# Patient Record
Sex: MALE | Race: WHITE | NOT HISPANIC OR LATINO | Employment: FULL TIME | URBAN - METROPOLITAN AREA
[De-identification: names, ages, dates, MRNs, and addresses within clinical notes are randomized per-mention and may not be internally consistent; named-entity substitution may affect disease eponyms.]

---

## 2017-01-17 ENCOUNTER — TELEPHONE (OUTPATIENT)
Dept: RADIOLOGY | Facility: HOSPITAL | Age: 62
End: 2017-01-17

## 2017-01-25 ENCOUNTER — TELEPHONE (OUTPATIENT)
Dept: TRANSPLANT | Facility: CLINIC | Age: 62
End: 2017-01-25

## 2017-01-25 NOTE — LETTER
January 25, 2017    Chapo Jacobo  [address] [facility] [address]          Dear Chapo Jacobo:  MRN: [id number]    Your lab work was due to be drawn on 1/23/17.  It is very important to get your labs drawn as scheduled.  We cannot monitor you for rejection, infections, or drug toxicity side effects without lab results. We also cannot refill medications without current lab results. Please call us at [phone] as soon as possible to let us know when you plan to have labs drawn.    You are also overdue for both your clinic visit with Dr. Reyes and repeat liver transplant ultrasound.  Please call us as soon as possible to reschedule.       Sincerely,    Jennifer Patrick, RN, BSN  Liver Transplant Coordinator  [facility]  [address] [place] [address]  [phone]

## 2017-02-06 ENCOUNTER — TELEPHONE (OUTPATIENT)
Dept: GASTROENTEROLOGY | Facility: CLINIC | Age: 62
End: 2017-02-06

## 2017-02-06 DIAGNOSIS — K74.69 COMPENSATED HCV CIRRHOSIS: Primary | ICD-10-CM

## 2017-02-06 DIAGNOSIS — B19.20 COMPENSATED HCV CIRRHOSIS: Primary | ICD-10-CM

## 2017-02-06 NOTE — TELEPHONE ENCOUNTER
----- Message from Consuelo Ch sent at 2/6/2017 12:52 PM CST -----  Patient would like to reschedule his appointment and ultra sound that he cancelled on January 18.   Call him at [phone].                                            celis

## 2017-02-10 ENCOUNTER — LAB VISIT (OUTPATIENT)
Dept: LAB | Facility: HOSPITAL | Age: 62
End: 2017-02-10
Attending: INTERNAL MEDICINE
Payer: MEDICARE

## 2017-02-10 ENCOUNTER — TELEPHONE (OUTPATIENT)
Dept: TRANSPLANT | Facility: CLINIC | Age: 62
End: 2017-02-10

## 2017-02-10 DIAGNOSIS — Z94.4 LIVER TRANSPLANTED: ICD-10-CM

## 2017-02-10 LAB
ALBUMIN SERPL BCP-MCNC: 4.2 G/DL
ALP SERPL-CCNC: 88 U/L
ALT SERPL W/O P-5'-P-CCNC: 20 U/L
ANION GAP SERPL CALC-SCNC: 8 MMOL/L
AST SERPL-CCNC: 28 U/L
BASOPHILS # BLD AUTO: 0.07 K/UL
BASOPHILS NFR BLD: 0.8 %
BILIRUB SERPL-MCNC: 0.6 MG/DL
BUN SERPL-MCNC: 13 MG/DL
CALCIUM SERPL-MCNC: 9.6 MG/DL
CHLORIDE SERPL-SCNC: 98 MMOL/L
CO2 SERPL-SCNC: 27 MMOL/L
CREAT SERPL-MCNC: 1.2 MG/DL
DIFFERENTIAL METHOD: ABNORMAL
EOSINOPHIL # BLD AUTO: 0.2 K/UL
EOSINOPHIL NFR BLD: 1.9 %
ERYTHROCYTE [DISTWIDTH] IN BLOOD BY AUTOMATED COUNT: 13.7 %
EST. GFR  (AFRICAN AMERICAN): >60 ML/MIN/1.73 M^2
EST. GFR  (NON AFRICAN AMERICAN): >60 ML/MIN/1.73 M^2
GLUCOSE SERPL-MCNC: 144 MG/DL
HCT VFR BLD AUTO: 46.5 %
HGB BLD-MCNC: 16.4 G/DL
INR PPP: 1.1
LYMPHOCYTES # BLD AUTO: 1.6 K/UL
LYMPHOCYTES NFR BLD: 19.5 %
MCH RBC QN AUTO: 32.7 PG
MCHC RBC AUTO-ENTMCNC: 35.3 %
MCV RBC AUTO: 93 FL
MONOCYTES # BLD AUTO: 0.5 K/UL
MONOCYTES NFR BLD: 5.9 %
NEUTROPHILS # BLD AUTO: 6 K/UL
NEUTROPHILS NFR BLD: 71.5 %
PLATELET # BLD AUTO: 210 K/UL
PMV BLD AUTO: 12.3 FL
POTASSIUM SERPL-SCNC: 4.7 MMOL/L
PROT SERPL-MCNC: 8.3 G/DL
PROTHROMBIN TIME: 11.7 SEC
RBC # BLD AUTO: 5.02 M/UL
SODIUM SERPL-SCNC: 133 MMOL/L
WBC # BLD AUTO: 8.43 K/UL

## 2017-02-10 PROCEDURE — 85610 PROTHROMBIN TIME: CPT

## 2017-02-10 PROCEDURE — 80053 COMPREHEN METABOLIC PANEL: CPT

## 2017-02-10 PROCEDURE — 36415 COLL VENOUS BLD VENIPUNCTURE: CPT | Mod: PO

## 2017-02-10 PROCEDURE — 85025 COMPLETE CBC W/AUTO DIFF WBC: CPT

## 2017-02-10 PROCEDURE — 80197 ASSAY OF TACROLIMUS: CPT

## 2017-02-10 NOTE — TELEPHONE ENCOUNTER
"Pt called, states he forgot about labs but did them today.  Pt also requests that we reschedule his clinic and u/s apt in [place].  Pt also states "somone called him and left him a message" about "a medicine." Pt unsure of who called him or what medication was called in.  Advised pt to re listen to the VM to see if the provider and medication is stated. Pt states he "is too tired" to do that.  Advised pt that I do not see any missed calls from any dept at [facility] regarding medications. Pt verbalized understanding.   "

## 2017-02-11 LAB — TACROLIMUS BLD-MCNC: 2.5 NG/ML

## 2017-02-14 ENCOUNTER — TELEPHONE (OUTPATIENT)
Dept: TRANSPLANT | Facility: CLINIC | Age: 62
End: 2017-02-14

## 2017-02-14 NOTE — LETTER
February 14, 2017    Chapo Odalis  [address] [facility] [address]          Dear Chapo Jacobo:  MRN: [id number]    Your lab results were stable.  There are no medicine changes.  Please have your labs drawn again on 5/15/17.      If you cannot have your labs drawn on the scheduled date, it is your responsibility to call the transplant department to reschedule.  To reschedule or make an appointment, please as to speak to or leave a message for my assistant, Yuko Weston, at [phone].  When leaving a message for Trista Huntley, or myself, we ask that you leave a brief message regarding your request.    Sincerely,        Your Liver Transplant Coordinator    [facility]  [address] [place] [address] [phone]

## 2017-02-15 ENCOUNTER — TELEPHONE (OUTPATIENT)
Dept: RADIOLOGY | Facility: HOSPITAL | Age: 62
End: 2017-02-15

## 2017-02-16 ENCOUNTER — TELEPHONE (OUTPATIENT)
Dept: GASTROENTEROLOGY | Facility: CLINIC | Age: 62
End: 2017-02-16

## 2017-02-16 NOTE — TELEPHONE ENCOUNTER
Left message on answering machine to return a call to Dr. Reyes's office at [facility].  Confirming appointment.

## 2017-02-17 ENCOUNTER — TELEPHONE (OUTPATIENT)
Dept: GASTROENTEROLOGY | Facility: CLINIC | Age: 62
End: 2017-02-17

## 2017-02-17 NOTE — TELEPHONE ENCOUNTER
----- Message from Karen Vinson sent at 2/17/2017 10:08 AM CST -----  Contact: pt  Pt asked to speak with Chloe concerning missed appt, pt can be reached at [phone]///thxMW

## 2017-03-08 RX ORDER — TACROLIMUS 1 MG/1
CAPSULE ORAL
Qty: 120 CAPSULE | Refills: 5 | Status: SHIPPED | OUTPATIENT
Start: 2017-03-08 | End: 2018-07-24 | Stop reason: SDUPTHER

## 2017-04-10 ENCOUNTER — TELEPHONE (OUTPATIENT)
Dept: GASTROENTEROLOGY | Facility: CLINIC | Age: 62
End: 2017-04-10

## 2017-04-10 NOTE — TELEPHONE ENCOUNTER
Left message on answering machine to return a call to Dr. Reyes's office at [facility].  Schedule appointment.

## 2017-04-18 ENCOUNTER — TELEPHONE (OUTPATIENT)
Dept: GASTROENTEROLOGY | Facility: CLINIC | Age: 62
End: 2017-04-18

## 2017-04-18 NOTE — TELEPHONE ENCOUNTER
Spoke with patient. Informed him of appointments for Dr. Reyes, he verbalized understanding. SHANTEL

## 2017-05-12 ENCOUNTER — TELEPHONE (OUTPATIENT)
Dept: RADIOLOGY | Facility: HOSPITAL | Age: 62
End: 2017-05-12

## 2017-05-15 ENCOUNTER — HOSPITAL ENCOUNTER (OUTPATIENT)
Dept: RADIOLOGY | Facility: HOSPITAL | Age: 62
Discharge: HOME | End: 2017-05-15
Attending: INTERNAL MEDICINE
Payer: MEDICARE

## 2017-05-15 DIAGNOSIS — B19.20 COMPENSATED HCV CIRRHOSIS: ICD-10-CM

## 2017-05-15 DIAGNOSIS — K74.69 COMPENSATED HCV CIRRHOSIS: ICD-10-CM

## 2017-05-15 PROCEDURE — 76705 ECHO EXAM OF ABDOMEN: CPT | Mod: TC

## 2017-05-15 PROCEDURE — 76705 ECHO EXAM OF ABDOMEN: CPT | Mod: 26,,, | Performed by: RADIOLOGY

## 2017-05-17 ENCOUNTER — OFFICE VISIT (OUTPATIENT)
Dept: GASTROENTEROLOGY | Facility: CLINIC | Age: 62
End: 2017-05-17
Payer: MEDICARE

## 2017-05-17 ENCOUNTER — TELEPHONE (OUTPATIENT)
Dept: TRANSPLANT | Facility: CLINIC | Age: 62
End: 2017-05-17

## 2017-05-17 VITALS
SYSTOLIC BLOOD PRESSURE: 138 MMHG | HEIGHT: 70 IN | DIASTOLIC BLOOD PRESSURE: 66 MMHG | HEART RATE: 88 BPM | WEIGHT: 229.25 LBS | BODY MASS INDEX: 32.82 KG/M2

## 2017-05-17 DIAGNOSIS — K74.69 COMPENSATED HCV CIRRHOSIS: Primary | ICD-10-CM

## 2017-05-17 DIAGNOSIS — Z94.4 TRANSPLANTED LIVER: ICD-10-CM

## 2017-05-17 DIAGNOSIS — Z94.4 LIVER TRANSPLANTED: Primary | ICD-10-CM

## 2017-05-17 DIAGNOSIS — D84.9 IMMUNOSUPPRESSION: ICD-10-CM

## 2017-05-17 DIAGNOSIS — B19.20 COMPENSATED HCV CIRRHOSIS: Primary | ICD-10-CM

## 2017-05-17 DIAGNOSIS — N52.1 ERECTILE DYSFUNCTION DUE TO DISEASES CLASSIFIED ELSEWHERE: ICD-10-CM

## 2017-05-17 DIAGNOSIS — Z12.11 COLON CANCER SCREENING: ICD-10-CM

## 2017-05-17 PROCEDURE — 99999 PR PBB SHADOW E&M-EST. PATIENT-LVL III: CPT | Mod: PBBFAC,,, | Performed by: INTERNAL MEDICINE

## 2017-05-17 PROCEDURE — 99213 OFFICE O/P EST LOW 20 MIN: CPT | Mod: S$GLB,,, | Performed by: INTERNAL MEDICINE

## 2017-05-17 RX ORDER — ALPRAZOLAM 1 MG/1
1 TABLET ORAL 2 TIMES DAILY
Refills: 1 | COMMUNITY
Start: 2017-05-05

## 2017-05-17 RX ORDER — POLYETHYLENE GLYCOL 3350, SODIUM SULFATE ANHYDROUS, SODIUM BICARBONATE, SODIUM CHLORIDE, POTASSIUM CHLORIDE 236; 22.74; 6.74; 5.86; 2.97 G/4L; G/4L; G/4L; G/4L; G/4L
4 POWDER, FOR SOLUTION ORAL ONCE
Qty: 4000 ML | Refills: 0 | Status: SHIPPED | OUTPATIENT
Start: 2017-05-17 | End: 2017-05-17

## 2017-05-17 NOTE — TELEPHONE ENCOUNTER
----- Message from Viviana Reyes MD sent at 5/17/2017 12:51 PM CDT -----  Repeat labs per routine    MELD-Na score: 6 at 5/15/2017  9:18 AM  MELD score: 6 at 5/15/2017  9:18 AM  Calculated from:  Serum Creatinine: 0.9 mg/dL (Rounded to 1) at 5/15/2017  9:18 AM  Serum Sodium: 138 mmol/L (Rounded to 137) at 5/15/2017  9:18 AM  Total Bilirubin: 0.6 mg/dL (Rounded to 1) at 5/15/2017  9:18 AM  INR(ratio): 1.0 at 5/15/2017  9:18 AM  Age: 61 years

## 2017-05-17 NOTE — MR AVS SNAPSHOT
[facility]  Gastroenterology  [address]  [facility] [address]  Phone: [phone]  Fax: [phone]                  Chapo Jacobo   2017 10:30 AM   Office Visit    Description:  Male : 1955   Provider:  Viviana Reyes MD   Department:  [facility] - Gastroenterology           Reason for Visit     Cirrhosis           Diagnoses this Visit        Comments    Compensated HCV cirrhosis    -  Primary     Transplanted liver         Immunosuppression         Colon cancer screening         Erectile dysfunction due to diseases classified elsewhere                To Do List           Future Appointments        Provider Department Dept Phone    7/3/2017 8:40 AM MD [facility] - Urology [phone]    2017 8:00 AM ONLH US1 [facility] [phone]    2017 8:50 AM LABORATORY, [address] [facility] [phone]    2017 11:00 AM Viviana Reyes MD [facility] Gastroenterology [phone]      Your Future Surgeries/Procedures     Jul 10, 2017   Surgery with Viviana Reyes MD   [facility])    [address]  [facility] [address] [phone]              Goals (5 Years of Data)     None       These Medications        Disp Refills Start End    polyethylene glycol (GOLYTELY,NULYTELY) 236-22.74-6.74 -5.86 gram suspension 4000 mL 0 2017    Take 4,000 mLs (4 L total) by mouth once. - Oral    Pharmacy: [facility] [address] [address] Ph #: [phone]         [facility]     [facility] Care Line -  Assistance  Unless otherwise directed by your provider, please contact [facility], our nurse care line that is available for  assistance.     Registered nurses in the [facility] Center provide: appointment scheduling, clinical advisement, health education, and other advisory services.  Call:  [phone] (toll free)               Medications           Message regarding Medications     Verify the changes and/or additions to your medication regime listed below are the same as discussed with your clinician today.  If any of these changes or additions are incorrect, please notify your healthcare provider.        START taking these NEW medications        Refills    polyethylene glycol (GOLYTELY,NULYTELY) 236-22.74-6.74 -5.86 gram suspension 0    Sig: Take 4,000 mLs (4 L total) by mouth once.    Class: Normal    Route: Oral      STOP taking these medications     testosterone cypionate (DEPOTESTOTERONE CYPIONATE) 200 mg/mL injection Inject 200 mg into the muscle every 14 (fourteen) days.           Verify that the below list of medications is an accurate representation of the medications you are currently taking.  If none reported, the list may be blank. If incorrect, please contact your healthcare provider. Carry this list with you in case of emergency.           Current Medications     alprazolam (XANAX) 1 MG tablet Take 1 mg by mouth 2 (two) times daily.    amphetamine-dextroamphetamine (ADDERALL) 30 mg Tab Take 1 tablet by mouth Daily.    dutasteride-tamsulosin 0.5-0.4 mg CM24 Take by mouth.    fluoxetine (PROZAC) 20 MG capsule Take 4 capsules by mouth Daily.     glimepiride (AMARYL) 4 MG tablet Take 1 tablet by mouth as needed.     hydrocortisone 2.5 % cream     metformin (GLUCOPHAGE) 1000 MG tablet Take 1 tablet by mouth Daily.    pioglitazone (ACTOS) 30 MG tablet Take 1 tablet by mouth Daily.    valacyclovir (VALTREX) 500 MG tablet Take 500 mg by mouth once daily.     zolpidem (AMBIEN) 10 mg Tab Take 5 mg by mouth nightly as needed.    loratadine (CLARITIN) 10 mg tablet Take 1 tablet (10 mg total) by mouth once daily.    polyethylene glycol (GOLYTELY,NULYTELY) 236-22.74-6.74 -5.86 gram suspension Take 4,000 mLs (4 L total) by mouth once.    tacrolimus (PROGRAF) 1 MG Cap TAKE 2 CAPSULES BY MOUTH TWICE  "DAILY           Clinical Reference Information           Your Vitals Were     BP Pulse Height Weight BMI    138/66 88 5' 10" (1.778 m) 104 kg (229 lb 4.5 oz) 32.9 kg/m2      Blood Pressure          Most Recent Value    BP  138/66      Allergies as of 5/17/2017     Codeine    Darvocet A500 [Propoxyphene N-acetaminophen]    Paroxetine Hcl    Penicillins      Immunizations Administered on Date of Encounter - 5/17/2017     None      Orders Placed During Today's Visit      Normal Orders This Visit    Ambulatory Referral to Urology     Case request GI: ESOPHAGOGASTRODUODENOSCOPY (EGD), COLONOSCOPY     Recurring Lab Work Interval Expires    AFP tumor marker   5/17/2018    CBC auto differential   5/17/2018    Comprehensive metabolic panel   5/17/2018    Protime-INR   5/17/2018    US Abdomen Limited   5/17/2018      Maintenance Dialysis History     Patient has no recorded history of maintenance dialysis.      Transplant Information        Txp Date Organ Coordinator Care Team    12/29/2000 Liver Jennifer Patrick RN Surgeon:  Maurisio Martins MD   Referring Physician:  DEBORAH Nash MD   Assisting Surgeon:  Moreno Soriano Jr., MD   Current Hepatologist:  Moreno Aldana MD   Corresponding Physician:  Ismael Jackson MD   Current PCP:  Ismael Jackson MD         [facility] Sign-Up     Activating your [facility] account is as easy as 1-2-3!     1) Visit [email or web address], select Sign Up Now, enter this activation code and your date of birth, then select Next.  [email or web address]  Expires: 7/1/2017 11:17 AM      2) Create a username and password to use when you visit [facility] in the future and select a security question in case you lose your password and select Next.    3) Enter your e-mail address and click Sign Up!    Additional Information  If you have questions, please e-mail [email or web address] or call [phone] to talk to our [facility] staff. Remember, [facility] is NOT to be used for urgent needs. For medical emergencies, dial " 341.         Smoking Cessation     If you would like to quit smoking:   You may be eligible for free services if you are a [place] resident and started smoking cigarettes before September 1, 1988.  Call the Smoking Cessation Trust ([facility]) toll free at (577) 172-8263 or [phone]) 395-2243.   Call 1-800-QUIT-NOW if you do not meet the above criteria.   Contact us via email: [email or web address]   View our website for more information: [email or web address]        Language Assistance Services     ATTENTION: Language assistance services are available, free of charge. Please call [phone].      ATENCIÓN: Si habla español, tiene a eason disposición servicios gratuitos de asistencia lingüística. Llame al [phone].     CHÚ Ý: N?u b?n nói Ti?ng Vi?t, có các d?ch v? h? tr? ngôn ng? mi?n phí dành cho b?n. G?i s? [phone].         [facility] complies with applicable Federal civil rights laws and does not discriminate on the basis of race, color, national origin, age, disability, or sex.

## 2017-05-17 NOTE — PROGRESS NOTES
Transplant Hepatology  Liver Transplant Recipient Follow-up    Transplant Date: 12/29/2000      Chapo is here for follow up of his liver transplant.      Subjective:     Interval History:    Mr. Jacobo is s/p liver transplant for HCV cirrhosis 2000, had recurrent HCV cirrhosis on 2007 biopsy. He was treated again with interferon and achieved SVR.     He continues with fatigue. Main concern is erectile dysfunction.    No evidence of liver decompensation: no ascites, confusion or GI bleeding.        Review of Systems    Objective:     Physical Exam   Constitutional: He is oriented to person, place, and time. He appears well-developed and well-nourished. No distress.   HENT:   Head: Normocephalic and atraumatic.   Mouth/Throat: Oropharynx is clear and moist. No oropharyngeal exudate.   Eyes: Conjunctivae are normal. Pupils are equal, round, and reactive to light. Right eye exhibits no discharge. Left eye exhibits no discharge. No scleral icterus.   Pulmonary/Chest: Effort normal and breath sounds normal. No respiratory distress. He has no wheezes.   Abdominal: Soft. He exhibits no distension. There is no tenderness.   Musculoskeletal: He exhibits no edema.   Neurological: He is alert and oriented to person, place, and time.   Psychiatric: He has a normal mood and affect. His behavior is normal.   Vitals reviewed.      WBC   Date Value Ref Range Status   05/15/2017 6.91 3.90 - 12.70 K/uL Final     Hemoglobin   Date Value Ref Range Status   05/15/2017 16.7 14.0 - 18.0 g/dL Final     Hematocrit   Date Value Ref Range Status   05/15/2017 47.5 40.0 - 54.0 % Final     Platelets   Date Value Ref Range Status   05/15/2017 200 150 - 350 K/uL Final     BUN, Bld   Date Value Ref Range Status   05/15/2017 10 8 - 23 mg/dL Final     Creatinine   Date Value Ref Range Status   05/15/2017 0.9 0.5 - 1.4 mg/dL Final     Glucose   Date Value Ref Range Status   05/15/2017 122 (H) 70 - 110 mg/dL Final     Calcium   Date Value Ref Range  Status   05/15/2017 9.0 8.7 - 10.5 mg/dL Final     Sodium   Date Value Ref Range Status   05/15/2017 138 136 - 145 mmol/L Final     Potassium   Date Value Ref Range Status   05/15/2017 3.9 3.5 - 5.1 mmol/L Final     Chloride   Date Value Ref Range Status   05/15/2017 104 95 - 110 mmol/L Final     AST   Date Value Ref Range Status   05/15/2017 22 10 - 40 U/L Final     ALT   Date Value Ref Range Status   05/15/2017 19 10 - 44 U/L Final     Alkaline Phosphatase   Date Value Ref Range Status   05/15/2017 79 55 - 135 U/L Final     Total Bilirubin   Date Value Ref Range Status   05/15/2017 0.6 0.1 - 1.0 mg/dL Final     Comment:     For infants and newborns, interpretation of results should be based  on gestational age, weight and in agreement with clinical  observations.  Premature Infant recommended reference ranges:  Up to 24 hours.............<8.0 mg/dL  Up to 48 hours............<12.0 mg/dL  3-5 days..................<15.0 mg/dL  6-29 days.................<15.0 mg/dL       Albumin   Date Value Ref Range Status   05/15/2017 3.6 3.5 - 5.2 g/dL Final     INR   Date Value Ref Range Status   05/15/2017 1.0 0.8 - 1.2 Final     Comment:     Coumadin Therapy:  2.0 - 3.0 for INR for all indicators except mechanical heart valves  and antiphospholipid syndromes which should use 2.5 - 3.5.       Lab Results   Component Value Date    TACROLIMUS 6.0 05/15/2017       MELD-Na score: 6 at 5/15/2017  9:18 AM  MELD score: 6 at 5/15/2017  9:18 AM  Calculated from:  Serum Creatinine: 0.9 mg/dL (Rounded to 1) at 5/15/2017  9:18 AM  Serum Sodium: 138 mmol/L (Rounded to 137) at 5/15/2017  9:18 AM  Total Bilirubin: 0.6 mg/dL (Rounded to 1) at 5/15/2017  9:18 AM  INR(ratio): 1.0 at 5/15/2017  9:18 AM  Age: 61 years      Assessment/Plan:     1. Compensated HCV cirrhosis    2. Transplanted liver    3. Immunosuppression    4. Colon cancer screening    5. Erectile dysfunction due to diseases classified elsewhere      Compensated HCV cirrhosis-low  meld, well compensated  -Repeat meld labs every 6 months  -HCC surveillance every 6 months  -Due for EGD and colonoscopy now    Immunosuppression  -continue current immunosuppression    Transplanted liver-recurrent cirrhosis although with good allograft function  -Labs per routine    History of colon polyps  -Repeat colonoscopy due now    Erectile dysfunction-likely multifactorial including presence of cirrhosis  -Will refer to urology    Return to clinic in 6 months with pre-clinic labs and ultrasound    Patient was seen in the liver transplant department at [facility].    Viviana Reyes MD           [facility] Patient Status  Functional Status: 80% - Normal activity with effort: some symptoms of disease  Physical Capacity: No Limitations

## 2017-05-17 NOTE — LETTER
May 17, 2017    Chapo Odalis  [address] [facility] [address]          Dear Chapo Jacobo:  MRN: [id number]    Your lab results and your ultrasound results were stable.  There are no medicine changes.  Please have your labs drawn again on 8/7/17.      If you cannot have your labs drawn on the scheduled date, it is your responsibility to call the transplant department to reschedule.  To reschedule or make an appointment, please as to speak to or leave a message for my assistant, Yuko Weston, at [phone].  When leaving a message for Trista Huntley, or myself, we ask that you leave a brief message regarding your request.    Sincerely,    Jennifer Patrick, RN, BSN  Liver Transplant Coordinator  [facility]  [address] [place] [address] [phone]

## 2017-05-18 ENCOUNTER — TELEPHONE (OUTPATIENT)
Dept: TRANSPLANT | Facility: CLINIC | Age: 62
End: 2017-05-18

## 2017-07-10 ENCOUNTER — HOSPITAL ENCOUNTER (OUTPATIENT)
Facility: HOSPITAL | Age: 62
Discharge: HOME | End: 2017-07-10
Attending: INTERNAL MEDICINE | Admitting: INTERNAL MEDICINE
Payer: MEDICARE

## 2017-07-10 ENCOUNTER — ANESTHESIA EVENT (OUTPATIENT)
Dept: ENDOSCOPY | Facility: HOSPITAL | Age: 62
End: 2017-07-10
Payer: MEDICARE

## 2017-07-10 ENCOUNTER — ANESTHESIA (OUTPATIENT)
Dept: ENDOSCOPY | Facility: HOSPITAL | Age: 62
End: 2017-07-10
Payer: MEDICARE

## 2017-07-10 VITALS
SYSTOLIC BLOOD PRESSURE: 125 MMHG | TEMPERATURE: 98 F | DIASTOLIC BLOOD PRESSURE: 87 MMHG | OXYGEN SATURATION: 98 % | BODY MASS INDEX: 31.64 KG/M2 | WEIGHT: 221 LBS | HEART RATE: 77 BPM | RESPIRATION RATE: 18 BRPM | RESPIRATION RATE: 21 BRPM | HEIGHT: 70 IN

## 2017-07-10 DIAGNOSIS — K29.30 SUPERFICIAL GASTRITIS WITHOUT HEMORRHAGE, UNSPECIFIED CHRONICITY: ICD-10-CM

## 2017-07-10 DIAGNOSIS — Z12.11 COLON CANCER SCREENING: Primary | ICD-10-CM

## 2017-07-10 LAB — POCT GLUCOSE: 104 MG/DL (ref 70–110)

## 2017-07-10 PROCEDURE — 43239 EGD BIOPSY SINGLE/MULTIPLE: CPT | Performed by: INTERNAL MEDICINE

## 2017-07-10 PROCEDURE — 25000003 PHARM REV CODE 250: Performed by: NURSE ANESTHETIST, CERTIFIED REGISTERED

## 2017-07-10 PROCEDURE — 37000008 HC ANESTHESIA 1ST 15 MINUTES: Performed by: INTERNAL MEDICINE

## 2017-07-10 PROCEDURE — 37000009 HC ANESTHESIA EA ADD 15 MINS: Performed by: INTERNAL MEDICINE

## 2017-07-10 PROCEDURE — 88305 TISSUE EXAM BY PATHOLOGIST: CPT | Mod: 26,,, | Performed by: PATHOLOGY

## 2017-07-10 PROCEDURE — 82962 GLUCOSE BLOOD TEST: CPT | Performed by: INTERNAL MEDICINE

## 2017-07-10 PROCEDURE — 88305 TISSUE EXAM BY PATHOLOGIST: CPT | Performed by: PATHOLOGY

## 2017-07-10 PROCEDURE — 27201012 HC FORCEPS, HOT/COLD, DISP: Performed by: INTERNAL MEDICINE

## 2017-07-10 PROCEDURE — 45380 COLONOSCOPY AND BIOPSY: CPT | Performed by: INTERNAL MEDICINE

## 2017-07-10 PROCEDURE — 25000003 PHARM REV CODE 250: Performed by: INTERNAL MEDICINE

## 2017-07-10 PROCEDURE — 88342 IMHCHEM/IMCYTCHM 1ST ANTB: CPT | Mod: 26,,, | Performed by: PATHOLOGY

## 2017-07-10 PROCEDURE — 63600175 PHARM REV CODE 636 W HCPCS: Performed by: NURSE ANESTHETIST, CERTIFIED REGISTERED

## 2017-07-10 PROCEDURE — 45380 COLONOSCOPY AND BIOPSY: CPT | Mod: ,,, | Performed by: INTERNAL MEDICINE

## 2017-07-10 PROCEDURE — 43239 EGD BIOPSY SINGLE/MULTIPLE: CPT | Mod: 51,,, | Performed by: INTERNAL MEDICINE

## 2017-07-10 RX ORDER — LIDOCAINE HCL/PF 100 MG/5ML
SYRINGE (ML) INTRAVENOUS
Status: DISCONTINUED | OUTPATIENT
Start: 2017-07-10 | End: 2017-07-10

## 2017-07-10 RX ORDER — SODIUM CHLORIDE, SODIUM LACTATE, POTASSIUM CHLORIDE, CALCIUM CHLORIDE 600; 310; 30; 20 MG/100ML; MG/100ML; MG/100ML; MG/100ML
INJECTION, SOLUTION INTRAVENOUS CONTINUOUS
Status: DISCONTINUED | OUTPATIENT
Start: 2017-07-10 | End: 2017-07-10 | Stop reason: HOSPADM

## 2017-07-10 RX ORDER — PANTOPRAZOLE SODIUM 40 MG/1
40 TABLET, DELAYED RELEASE ORAL 2 TIMES DAILY
Qty: 60 TABLET | Refills: 0 | Status: SHIPPED | OUTPATIENT
Start: 2017-07-10 | End: 2019-07-02 | Stop reason: ALTCHOICE

## 2017-07-10 RX ORDER — SODIUM CHLORIDE, SODIUM LACTATE, POTASSIUM CHLORIDE, CALCIUM CHLORIDE 600; 310; 30; 20 MG/100ML; MG/100ML; MG/100ML; MG/100ML
INJECTION, SOLUTION INTRAVENOUS CONTINUOUS PRN
Status: DISCONTINUED | OUTPATIENT
Start: 2017-07-10 | End: 2017-07-10

## 2017-07-10 RX ORDER — PROPOFOL 10 MG/ML
INJECTION, EMULSION INTRAVENOUS
Status: DISCONTINUED | OUTPATIENT
Start: 2017-07-10 | End: 2017-07-10

## 2017-07-10 RX ADMIN — PROPOFOL 30 MG: 10 INJECTION, EMULSION INTRAVENOUS at 10:07

## 2017-07-10 RX ADMIN — PROPOFOL 30 MG: 10 INJECTION, EMULSION INTRAVENOUS at 09:07

## 2017-07-10 RX ADMIN — SODIUM CHLORIDE, SODIUM LACTATE, POTASSIUM CHLORIDE, AND CALCIUM CHLORIDE: 600; 310; 30; 20 INJECTION, SOLUTION INTRAVENOUS at 09:07

## 2017-07-10 RX ADMIN — PROPOFOL 40 MG: 10 INJECTION, EMULSION INTRAVENOUS at 09:07

## 2017-07-10 RX ADMIN — SODIUM CHLORIDE, SODIUM LACTATE, POTASSIUM CHLORIDE, AND CALCIUM CHLORIDE: .6; .31; .03; .02 INJECTION, SOLUTION INTRAVENOUS at 08:07

## 2017-07-10 RX ADMIN — PROPOFOL 140 MG: 10 INJECTION, EMULSION INTRAVENOUS at 09:07

## 2017-07-10 RX ADMIN — LIDOCAINE HYDROCHLORIDE 100 MG: 20 INJECTION, SOLUTION INTRAVENOUS at 09:07

## 2017-07-10 NOTE — DISCHARGE INSTRUCTIONS
Upper GI Endoscopy     During endoscopy, a long, flexible tube is used to view the inside of your upper GI tract.      Upper GI endoscopy allows your healthcare provider to look directly into the beginning of your gastrointestinal (GI) tract. The esophagus, stomach, and duodenum (the first part of the small intestine) make up the upper GI tract.   Before the exam  Follow these and any other instructions you are given before your endoscopy. If you dont follow the healthcare providers instructions carefully, the test may need to be canceled or done over:  · Don't eat or drink anything after midnight the night before your exam. If your exam is in the afternoon, drink only clear liquids in the morning. Don't eat or drink anything for 8 hours before the exam. In some cases, you may be able to take medicines with sips of water until 2 hours before the procedure. Speak with your healthcare provider about this.   · Bring your X-rays and any other test results you have.  · Because you will be sedated, arrange for an adult to drive you home after the exam.  · Tell your healthcare provider before the exam if you are taking any medicines or have any medical problems.  The procedure  Here is what to expect:  · You will lie on the endoscopy table. Usually patients lie on the left side.  · You will be monitored and given oxygen.  · Your throat may be numbed with a spray or gargle. You are given medicine through an intravenous (IV) line that will help you relax and remain comfortable. You may be awake or asleep during the procedure.  · The healthcare provider will put the endoscope in your mouth and down your esophagus. It is thinner than most pieces of food that you swallow. It will not affect your breathing. The medicine helps keep you from gagging.  · Air is put into your GI tract to expand it. It can make you burp.  · During the procedure, the healthcare provider can take biopsies (tissue samples), remove abnormalities,  such as polyps, or treat abnormalities through a variety of devices placed through the endoscope. You will not feel this.   · The endoscope carries images of your upper GI tract to a video screen. If you are awake, you may be able to look at the images.  · After the procedure is done, you will rest for a time. An adult must drive you home.  When to call your healthcare provider  Contact your healthcare provider if you have:  · Black or tarry stools, or blood in your stool  · Fever  · Pain in your belly that does not go away  · Nausea and vomiting, or vomiting blood   Date Last Reviewed: 7/1/2016 © 2000-2016 [employer]. [address] [place] [address], [place] [address]. All rights reserved. This information is not intended as a substitute for professional medical care. Always follow your healthcare professional's instructions.        Gastritis (Adult)    Gastritis is inflammation and irritation of the stomach lining. It can be present for a short time (acute) or be long lasting (chronic). Gastritis is often caused by infection with bacteria called H pylori. More than a third of people in the [place] have this bacteria in their bodies. In many cases, H pylori causes no problems or symptoms. In some people, though, the infection irritates the stomach lining and causes gastritis. Other causes of stomach irritation include drinking alcohol or taking pain-relieving medicines called NSAIDs (such as aspirin or ibuprofen).   Symptoms of gastritis can include:  · Abdominal pain or bloating  · Loss of appetite  · Nausea or vomiting  · Vomiting blood or having black stools  · Feeling more tired than usual  An inflamed and irritated stomach lining is more likely to develop a sore called an ulcer. To help prevent this, gastritis should be treated.  Home care  If needed, medicines may be prescribed. If you have H pylori infection, treating it will likely relieve your symptoms. Other changes can help reduce stomach irritation  and help it heal.  · If you have been prescribed medicines for H pylori infection, take them as directed. Take all of the medicine until it is finished or your healthcare provider tells you to stop, even if you feel better.  · Your healthcare provider may recommend avoiding NSAIDs. If you take daily aspirin for your heart or other medical reasons, do not stop without talking to your healthcare provider first.  · Avoid drinking alcohol.  · Stop smoking. Smoking can irritate the stomach and delay healing. As much as possible, stay away from second hand smoke.  Follow-up care  Follow up with your healthcare provider, or as advised by our staff. Testing may be needed to check for inflammation or an ulcer.  When to seek medical advice  Call your healthcare provider for any of the following:  · Stomach pain that gets worse or moves to the lower right abdomen (appendix area)  · Chest pain that appears or gets worse, or spreads to the back, neck, shoulder, or arm  · Frequent vomiting (cant keep down liquids)  · Blood in the stool or vomit (red or black in color)  · Feeling weak or dizzy  · Fever of 100.4ºF (38ºC) or higher, or as directed by your healthcare provider  Date Last Reviewed: 6/22/2015 © 2000-2016 [employer]. [address] [place] [address], [place] [address]. All rights reserved. This information is not intended as a substitute for professional medical care. Always follow your healthcare professional's instructions.        Colonoscopy     A camera attached to a flexible tube with a viewing lens is used to take video pictures.     Colonoscopy is a test to view the inside of your lower digestive tract (colon and rectum). Sometimes it can show the last part of the small intestine (ileum). During the test, small pieces of tissue may be removed for testing. This is called a biopsy. Small growths, such as polyps, may also be removed.   Why is colonoscopy done?  The test is done to help look for colon cancer.  And it can help find the source of abdominal pain, bleeding, and changes in bowel habits. It may be needed once a year, depending on factors such as your:  · Age  · Health history  · Family health history  · Symptoms  · Results from any prior colonoscopy  Risks and possible complications  These include:  · Bleeding               · A puncture or tear in the colon   · Risks of anesthesia  · A cancer lesion not being seen  Getting ready   To prepare for the test:  · Talk with your healthcare provider about the risks of the test (see below). Also ask your healthcare provider about alternatives to the test.  · Tell your healthcare provider about any medicines you take. Also tell him or her about any health conditions you may have.  · Make sure your rectum and colon are empty for the test. Follow the diet and bowel prep instructions exactly. If you dont, the test may need to be rescheduled.  · Plan for a friend or family member to drive you home after the test.     Colonoscopy provides an inside view of the entire colon.     You may discuss the results with your doctor right away or at a future visit.  During the test   The test is usually done in the hospital on an outpatient basis. This means you go home the same day. The procedure takes about 30 minutes. During that time:  · You are given relaxing (sedating) medicine through an IV line. You may be drowsy, or fully asleep.  · The healthcare provider will first give you a physical exam to check for anal and rectal problems.  · Then the anus is lubricated and the scope inserted.  · If you are awake, you may have a feeling similar to needing to have a bowel movement. You may also feel pressure as air is pumped into the colon. Its OK to pass gas during the procedure.  · Biopsy, polyp removal, or other treatments may be done during the test.  After the test   You may have gas right after the test. It can help to try to pass it to help prevent later bloating. Your  healthcare provider may discuss the results with you right away. Or you may need to schedule a follow-up visit to talk about the results. After the test, you can go back to your normal eating and other activities. You may be tired from the sedation and need to rest for a few hours.  Date Last Reviewed: 11/1/2016  © 6415-2970 [employer]. [address] [place] [address], [place] [address]. All rights reserved. This information is not intended as a substitute for professional medical care. Always follow your healthcare professional's instructions.        Understanding Colon and Rectal Polyps    The colon (also called the large intestine) is a muscular tube that forms the last part of the digestive tract. It absorbs water and stores food waste. The colon is about 4 to 6 feet long. The rectum is the last 6 inches of the colon. The colon and rectum have a smooth lining composed of millions of cells. Changes in these cells can lead to growths in the colon that can become cancerous and should be removed. Multiple tests are available to screen for colon cancer, but the colonoscopy is the most recommended test. During colonoscopy, these polyps can be removed. How often you need this test depends on many things including your condition, your family history, symptoms, and what the findings were at the previous colonoscopy.   When the colon lining changes  Changes that happen in the cells that line the colon or rectum can lead to growths called polyps. Over a period of years, polyps can turn cancerous. Removing polyps early may prevent cancer from ever forming.  Polyps  Polyps are fleshy clumps of tissue that form on the lining of the colon or rectum. Small polyps are usually benign (not cancerous). However, over time, cells in a polyp can change and become cancerous. Certain types of polyps known as adenomatous polyps are premalignant. The risk for invasive cancer increases with the size of the polyp and certain cell and  gene features. This means that they can become cancerous if they're not removed. Hyperplastic polyps are benign. They can grow quite large and not turn cancerous.   Cancer  Almost all colorectal cancers start when polyp cells begin growing abnormally. As a cancerous tumor grows, it may involve more and more of the colon or rectum. In time, cancer can also grow beyond the colon or rectum and spread to nearby organs or to glands called lymph nodes. The cells can also travel to other parts of the body. This is known as metastasis. The earlier a cancerous tumor is removed, the better the chance of preventing its spread.    Date Last Reviewed: 8/1/2016  © 1902-7818 [employer]. [address] [place] [address], [place] [address]. All rights reserved. This information is not intended as a substitute for professional medical care. Always follow your healthcare professional's instructions.

## 2017-07-10 NOTE — PLAN OF CARE
Problem: Fall Risk (Adult)  Goal: Absence of Falls  Patient will demonstrate the desired outcomes by discharge/transition of care.   Outcome: Outcome(s) achieved Date Met: 07/10/17  Pt denies c/o discomfort, dc instructions reviewed, criteria met, iv dc'd tolerated well no bleeding noted, ok to dc to [facility] via [facility] with fmly

## 2017-07-10 NOTE — ANESTHESIA RELEASE NOTE
"Anesthesia Release from PACU Note    Patient: Chapo Jacobo    Procedure(s) Performed: Procedure(s) (LRB):  ESOPHAGOGASTRODUODENOSCOPY (EGD) (N/A)  COLONOSCOPY (N/A)    Anesthesia type: MAC    Post pain: Adequate analgesia    Post assessment: no apparent anesthetic complications, tolerated procedure well and no evidence of recall    Last Vitals:   Visit Vitals  /74 (BP Location: Right leg, Patient Position: Lying, BP Method: Automatic)   Pulse 71   Temp 36.7 °C (98.1 °F) (Oral)   Resp 19   Ht 5' 10" (1.778 m)   Wt 100.2 kg (221 lb)   SpO2 97%   BMI 31.71 kg/m²       Post vital signs: stable    Level of consciousness: awake and alert     Nausea/Vomiting: no nausea/no vomiting    Complications: none    Airway Patency: patent    Respiratory: unassisted, spontaneous ventilation, room air    Cardiovascular: stable    Hydration: euvolemic  "

## 2017-07-10 NOTE — ANESTHESIA PREPROCEDURE EVALUATION
07/10/2017  Chapo Jacobo is a 61 y.o., male.    Anesthesia Evaluation    I have reviewed the Patient Summary Reports.    I have reviewed the Nursing Notes.   I have reviewed the Medications.     Review of Systems  Anesthesia Hx:  No problems with previous Anesthesia    Social:  Smoker, Alcohol Use 1 ppd for about 40 years.   Hematology/Oncology:  Hematology Normal        Cardiovascular:   Exercise tolerance: good    Pulmonary:   Sleep Apnea, CPAP    Renal/:  Renal/ Normal     Hepatic/GI:   Liver Disease, Hepatitis 2000 liver transplant   Neurological:  Neurology Normal    Endocrine:   Diabetes, well controlled, type 2    Psych:   Psychiatric History          Physical Exam  General:  Well nourished, Obesity    Airway/Jaw/Neck:  Airway Findings: Mallampati: III                Anesthesia Plan  Type of Anesthesia, risks & benefits discussed:  Anesthesia Type:  MAC  Patient's Preference:   Intra-op Monitoring Plan:   Intra-op Monitoring Plan Comments:   Post Op Pain Control Plan:   Post Op Pain Control Plan Comments:   Induction:   IV  Beta Blocker:  Patient is not currently on a Beta-Blocker (No further documentation required).       Informed Consent: Patient understands risks and agrees with Anesthesia plan.  Questions answered. Anesthesia consent signed with patient.  ASA Score: 3     Day of Surgery Review of History & Physical: I have interviewed and examined the patient. I have reviewed the patient's H&P dated: 07/10/17. There are no significant changes.  H&P update referred to the surgeon.         Ready For Surgery From Anesthesia Perspective.

## 2017-07-10 NOTE — PLAN OF CARE
Bed repositioned. Will begin colonoscopy.       Pt adequately sedated.  Final time out done and agreed by all staff.

## 2017-07-10 NOTE — ANESTHESIA POSTPROCEDURE EVALUATION
"Anesthesia Post Evaluation    Patient: Chapo Jacobo    Procedure(s) Performed: Procedure(s) (LRB):  ESOPHAGOGASTRODUODENOSCOPY (EGD) (N/A)  COLONOSCOPY (N/A)    Final Anesthesia Type: MAC  Patient location during evaluation: PACU  Patient participation: Yes- Able to Participate  Level of consciousness: awake and alert and oriented  Post-procedure vital signs: reviewed and stable  Pain management: adequate  Airway patency: patent  PONV status at discharge: No PONV  Anesthetic complications: no      Cardiovascular status: blood pressure returned to baseline  Respiratory status: unassisted, room air and spontaneous ventilation  Hydration status: euvolemic  Follow-up not needed.        Visit Vitals  /74 (BP Location: Right leg, Patient Position: Lying, BP Method: Automatic)   Pulse 71   Temp 36.7 °C (98.1 °F) (Oral)   Resp 19   Ht 5' 10" (1.778 m)   Wt 100.2 kg (221 lb)   SpO2 97%   BMI 31.71 kg/m²       Pain/Judy Score: No Data Recorded      "

## 2017-07-10 NOTE — TRANSFER OF CARE
"Anesthesia Transfer of Care Note    Patient: Chapo Jacobo    Procedure(s) Performed: Procedure(s) (LRB):  ESOPHAGOGASTRODUODENOSCOPY (EGD) (N/A)  COLONOSCOPY (N/A)    Patient location: PACU    Anesthesia Type: MAC    Post pain: adequate analgesia    Post assessment: no apparent anesthetic complications    Post vital signs: stable    Level of consciousness: sedated    Nausea/Vomiting: no nausea/vomiting    Complications: none    Transfer of care protocol was followed      Last vitals:   Visit Vitals  /74 (BP Location: Right leg, Patient Position: Lying, BP Method: Automatic)   Pulse 71   Temp 36.7 °C (98.1 °F) (Oral)   Resp 19   Ht 5' 10" (1.778 m)   Wt 100.2 kg (221 lb)   SpO2 97%   BMI 31.71 kg/m²     "

## 2017-07-10 NOTE — H&P
Short Stay Endoscopy History and Physical    PCP - Ismael Jackson MD    Procedure - EGD/colonoscopy    Needs varcieal screening and colonoscopy for h/o colon polyps. Has overall been feeling less well with fatigue but this has been for months. No acute issues.    ROS:  Constitutional: No fevers, chills, No weight loss  ENT: No allergies  CV: No chest pain  Pulm: No cough, No shortness of breath  Ophtho: No vision changes  GI: see HPI  Derm: No rash  Heme: No lymphadenopathy, No bruising  MSK: No arthritis  : No dysuria, No hematuria  Endo: No hot or cold intolerance  Neuro: No syncope, No seizure  Psych: No anxiety, No depression    Medical History:  has a past medical history of Anxiety; Appendicitis; Depression; Diabetes mellitus; Encounter for blood transfusion; Gallbladder & bile duct stone with obstruction; Herpes; and Sleep apnea.    Surgical History:  has a past surgical history that includes Appendectomy; Abdominal surgery; Cervical fusion; Brain surgery; Lumbar disc surgery; Tonsillectomy; Liver transplant; bilateral rotator cuff surgery; and Cholecystectomy.    Family History: family history includes Arthritis in his mother; Cancer in his father and maternal grandfather; Fibromyalgia in his sister; Melanoma in his father.. Otherwise no colon cancer, inflammatory bowel disease, or GI malignancies.    Social History:  reports that he has been smoking Cigarettes.  He has a 46.00 pack-year smoking history. He has never used smokeless tobacco. He reports that he drinks about 1.2 oz of alcohol per week . He reports that he does not use drugs.    Review of patient's allergies indicates:   Allergen Reactions    Codeine      Other reaction(s): Unknown    Darvocet a500 [propoxyphene n-acetaminophen]     Paroxetine hcl      Other reaction(s): Unknown    Penicillins      Other reaction(s): Anaphylaxis       Medications:   No prescriptions prior to admission.       Objective Findings:    Vital Signs:   Vitals:     07/10/17 1041   BP: 125/87   Pulse: 77   Resp: 18   Temp:          Physical Exam:  General Appearance: Well appearing in no acute distress  Eyes:    No scleral icterus  ENT: Neck supple, Lips, mucosa, and tongue normal; teeth and gums normal  Lungs: CTA bilaterally in anterior and posterior fields, no wheezes, no crackles.  Heart:  Regular rate, S1, S2 normal, no murmurs heard.  Abdomen: Soft, non tender, non distended with normal bowel sounds. No hepatosplenomegaly, ascites, or mass.  Extremities: No clubbing, cyanosis or edema  Skin: No rash    Labs:  Lab Results   Component Value Date    WBC 6.91 05/15/2017    HGB 16.7 05/15/2017    HCT 47.5 05/15/2017     05/15/2017    CHOL 188 11/01/2016    TRIG 246 (H) 11/01/2016    HDL 29 (L) 11/01/2016    ALT 19 05/15/2017    AST 22 05/15/2017     05/15/2017    K 3.9 05/15/2017     05/15/2017    CREATININE 0.9 05/15/2017    BUN 10 05/15/2017    CO2 21 (L) 05/15/2017    TSH 2.540 11/01/2016    PSA 0.58 11/01/2016    INR 1.0 05/15/2017    HGBA1C 5.7 11/01/2016       I have explained the risks and benefits of endoscopy procedures to the patient including but not limited to bleeding, perforation, infection, and death.    Proceed with EGD for variceal screening and colonoscopy for h/o colon polyps.

## 2017-08-03 ENCOUNTER — HOSPITAL ENCOUNTER (EMERGENCY)
Facility: HOSPITAL | Age: 62
Discharge: HOME | End: 2017-08-03
Attending: EMERGENCY MEDICINE
Payer: MEDICARE

## 2017-08-03 VITALS
WEIGHT: 230 LBS | SYSTOLIC BLOOD PRESSURE: 148 MMHG | RESPIRATION RATE: 20 BRPM | BODY MASS INDEX: 32.93 KG/M2 | HEIGHT: 70 IN | HEART RATE: 82 BPM | TEMPERATURE: 98 F | OXYGEN SATURATION: 98 % | DIASTOLIC BLOOD PRESSURE: 60 MMHG

## 2017-08-03 DIAGNOSIS — H60.502 ACUTE OTITIS EXTERNA OF LEFT EAR, UNSPECIFIED TYPE: Primary | ICD-10-CM

## 2017-08-03 LAB — POCT GLUCOSE: 182 MG/DL (ref 70–110)

## 2017-08-03 PROCEDURE — 99283 EMERGENCY DEPT VISIT LOW MDM: CPT

## 2017-08-03 PROCEDURE — 25000003 PHARM REV CODE 250: Performed by: EMERGENCY MEDICINE

## 2017-08-03 PROCEDURE — 82962 GLUCOSE BLOOD TEST: CPT

## 2017-08-03 RX ORDER — TRAMADOL HYDROCHLORIDE 50 MG/1
50 TABLET ORAL
Status: COMPLETED | OUTPATIENT
Start: 2017-08-03 | End: 2017-08-03

## 2017-08-03 RX ORDER — TRAMADOL HYDROCHLORIDE 50 MG/1
50 TABLET ORAL EVERY 6 HOURS PRN
Qty: 12 TABLET | Refills: 0 | Status: SHIPPED | OUTPATIENT
Start: 2017-08-03 | End: 2017-08-13

## 2017-08-03 RX ORDER — CIPROFLOXACIN AND DEXAMETHASONE 3; 1 MG/ML; MG/ML
4 SUSPENSION/ DROPS AURICULAR (OTIC) 2 TIMES DAILY
Qty: 7.5 ML | Refills: 0 | Status: SHIPPED | OUTPATIENT
Start: 2017-08-03 | End: 2017-08-13

## 2017-08-03 RX ORDER — CIPROFLOXACIN AND DEXAMETHASONE 3; 1 MG/ML; MG/ML
4 SUSPENSION/ DROPS AURICULAR (OTIC) 2 TIMES DAILY
Status: DISCONTINUED | OUTPATIENT
Start: 2017-08-03 | End: 2017-08-03 | Stop reason: HOSPADM

## 2017-08-03 RX ADMIN — TRAMADOL HYDROCHLORIDE 50 MG: 50 TABLET, FILM COATED ORAL at 03:08

## 2017-08-03 RX ADMIN — CIPROFLOXACIN AND DEXAMETHASONE 4 DROP: 3; 1 SUSPENSION/ DROPS AURICULAR (OTIC) at 03:08

## 2017-08-03 NOTE — ED NOTES
Pt states has an earache for 5 days, getting worse with severe pain.  Armband checked, patient verified. Verified by spelling and stated name on armband along with .   Patient sitting up in bed, no acute distress noted, awake, alert, and oriented x 3, calm, respirations even and unlabored, and skin is warm and dry.

## 2017-08-03 NOTE — ED PROVIDER NOTES
SCRIBE #1 NOTE: I, Sj Fuentes, am scribing for, and in the presence of, Almaz Kay MD. I have scribed the entire note.      History      Chief Complaint   Patient presents with    Otalgia     Pt reports L sided ear pain x2 days.       Review of patient's allergies indicates:   Allergen Reactions    Codeine      Other reaction(s): Unknown    Darvocet a500 [propoxyphene n-acetaminophen]     Paroxetine hcl      Other reaction(s): Unknown    Penicillins      Other reaction(s): Anaphylaxis        HPI   HPI    8/3/2017, 3:35 AM   History obtained from the patient      History of Present Illness: Chapo Jacobo is a 61 y.o. male patient who presents to the Emergency Department for left ear pain which onset 2 days ago. The pain is constant and moderate in severity. No modifying factors reported. Patient denies any fever, chills, cough, congestion, CP, SOB, abdominal pain, N/V/D or any other symptoms at this time. He denies being in the water or in airplanes recently. He denies frequent exposure to loud noises. No further complaints or concerns at this time.     Arrival mode: Personal vehicle    PCP: Ismael Jackson MD       Past Medical History:  Past Medical History:   Diagnosis Date    Anxiety     Appendicitis     Depression     Diabetes mellitus     Encounter for blood transfusion     Gallbladder & bile duct stone with obstruction     Herpes     Sleep apnea        Past Surgical History:  Past Surgical History:   Procedure Laterality Date    ABDOMINAL SURGERY      APPENDECTOMY      bilateral rotator cuff surgery      BRAIN SURGERY      CERVICAL FUSION      CHOLECYSTECTOMY      COLONOSCOPY N/A 7/10/2017    Procedure: COLONOSCOPY;  Surgeon: Viviana Reyes MD;  Location: [facility];  Service: Endoscopy;  Laterality: N/A;    LIVER TRANSPLANT      LUMBAR DISC SURGERY      TONSILLECTOMY           Family History:  Family History   Problem Relation Age of Onset    Arthritis Mother     Melanoma  Father     Cancer Father      melanoma    Fibromyalgia Sister     Cancer Maternal Grandfather      unknown type       Social History:  Social History     Social History Main Topics    Smoking status: Current Every Day Smoker     Packs/day: 1.00     Years: 46.00     Types: Cigarettes    Smokeless tobacco: Never Used    Alcohol use 1.2 oz/week     2 Cans of beer per week    Drug use: No    Sexual activity: Yes     Partners: Female       ROS   Review of Systems   Constitutional: Negative for chills and fever.   HENT: Positive for ear pain (left). Negative for congestion and sore throat.    Respiratory: Negative for cough and shortness of breath.    Cardiovascular: Negative for chest pain.   Gastrointestinal: Negative for diarrhea, nausea and vomiting.   Genitourinary: Negative for dysuria.   Musculoskeletal: Negative for back pain.   Skin: Negative for rash.   Neurological: Negative for weakness.   Hematological: Does not bruise/bleed easily.       Physical Exam      Initial Vitals [08/03/17 0216]   BP Pulse Resp Temp SpO2   (!) 151/89 86 20 98.2 °F (36.8 °C) 98 %      MAP       109.67          Physical Exam  Nursing Notes and Vital Signs Reviewed.  Constitutional: Patient is in no acute distress. Well-developed and well-nourished.  Head: Atraumatic. Normocephalic.  Eyes: PERRL. EOM intact. Conjunctivae are not pale. No scleral icterus.  ENT: Mucous membranes are moist. Oropharynx is clear and symmetric. There is swelling and edema to the left ear canal with drainage; unable to visualize the TM secondary to the swelling. No mastoid tenderness. Right ear unremarkable.   Neck: Supple. Full ROM. No lymphadenopathy.  Cardiovascular: Regular rate. Regular rhythm. No murmurs, rubs, or gallops. Distal pulses are 2+ and symmetric.  Pulmonary/Chest: No respiratory distress. Clear to auscultation bilaterally. No wheezing, rales, or rhonchi.  Abdominal: Soft and non-distended.  There is no tenderness.  No rebound,  "guarding, or rigidity. Good bowel sounds.  Genitourinary: No CVA tenderness  Musculoskeletal: Moves all extremities. No obvious deformities. No edema. No calf tenderness.  Skin: Warm and dry.  Neurological:  Alert, awake, and appropriate.  Normal speech.  No acute focal neurological deficits are appreciated.  Psychiatric: Normal affect. Good eye contact. Appropriate in content.    ED Course    Procedures  ED Vital Signs:  Vitals:    08/03/17 0216 08/03/17 0436   BP: (!) 151/89 (!) 148/60   Pulse: 86 82   Resp: 20 20   Temp: 98.2 °F (36.8 °C)    TempSrc: Oral    SpO2: 98% 98%   Weight: 104.3 kg (230 lb)    Height: 5' 10" (1.778 m)        Abnormal Lab Results:  Labs Reviewed   POCT GLUCOSE - Abnormal; Notable for the following:        Result Value    POCT Glucose 182 (*)     All other components within normal limits   POCT GLUCOSE MONITORING CONTINUOUS        All Lab Results:  Results for orders placed or performed during the hospital encounter of 08/03/17   POCT glucose   Result Value Ref Range    POCT Glucose 182 (H) 70 - 110 mg/dL            The Emergency Provider reviewed the vital signs and test results, which are outlined above.    ED Discussion     3:39 AM:  Discussed with pt all pertinent ED information and results. Discussed pt dx and plan of tx. Gave pt all f/u and return to the ED instructions. All questions and concerns were addressed at this time. Pt expresses understanding of information and instructions, and is comfortable with plan to discharge. Pt is stable for discharge.    ED Medication(s):  Medications   ciprofloxacin-dexamethasone 0.3-0.1% otic suspension 4 drop (4 drops Left Ear Given 8/3/17 0344)   tramadol tablet 50 mg (50 mg Oral Given 8/3/17 0344)       Follow-up Information     Ismael Jackson MD. Schedule an appointment as soon as possible for a visit in 2 days.    Specialty:  Internal Medicine  Why:  Return to the emergency room, If symptoms worsen  Contact information:  [address]  [facility] [address]  [phone]             Alphonso Oseguera MD. Schedule an appointment as soon as possible for a visit in 2 days.    Specialty:  Otolaryngology  Contact information:  [address]  [place] [address]  [phone]                     Medical Decision Making    Medical Decision Making:   Clinical Tests:   Lab Tests: Ordered and Reviewed           Scribe Attestation:   Scribe #1: I performed the above scribed service and the documentation accurately describes the services I performed. I attest to the accuracy of the note.    Attending:   Physician Attestation Statement for Scribe #1: I, Almaz Kay MD, personally performed the services described in this documentation, as scribed by Sj Fuentes, in my presence, and it is both accurate and complete.        Clinical Impression       ICD-10-CM ICD-9-CM   1. Acute otitis externa of left ear, unspecified type H60.502 380.10       Disposition:   Disposition: Discharged  Condition: Stable         Almaz Kay MD  08/03/17 0556

## 2017-08-11 ENCOUNTER — TELEPHONE (OUTPATIENT)
Dept: TRANSPLANT | Facility: CLINIC | Age: 62
End: 2017-08-11

## 2017-09-14 ENCOUNTER — TELEPHONE (OUTPATIENT)
Dept: TRANSPLANT | Facility: CLINIC | Age: 62
End: 2017-09-14

## 2017-09-14 NOTE — LETTER
September 14, 2017    Chaporin FaganOdalis  [address] [facility] [address]          Dear Chapo Jacobo:  MRN: [id number]    Your lab work was due to be drawn on 9/14/17.  We contacted your lab and were unable to get test results.  It is very important to get your labs drawn as scheduled.  We cannot monitor you for rejection, infections, or drug toxicity side effects without lab results.  Please call us at [phone] as soon as possible to let us know when you plan to have labs drawn.    Sincerely,      Racheal Choudhary, RN, BSN  Your Liver Transplant Coordinator    [facility]  [address] [place] [id number] [phone]

## 2017-09-14 NOTE — TELEPHONE ENCOUNTER
Pt has now missed 2 labs dates, one on 8/7/17 and one on 9/11/17. A missed lab letter was mailed to pt with no response. His number just rings and disconnects, number for sister Fernanda works,jose TORRES for her to please have Mr. Jacobo to call coordinator, to update his condition, update contact number and that we need him to get labs because he is 2 months overdue.

## 2017-09-26 ENCOUNTER — HOSPITAL ENCOUNTER (EMERGENCY)
Facility: HOSPITAL | Age: 62
Discharge: HOME | End: 2017-09-26
Payer: MEDICARE

## 2017-09-26 VITALS
DIASTOLIC BLOOD PRESSURE: 79 MMHG | OXYGEN SATURATION: 99 % | HEART RATE: 90 BPM | BODY MASS INDEX: 31.5 KG/M2 | RESPIRATION RATE: 17 BRPM | WEIGHT: 220 LBS | SYSTOLIC BLOOD PRESSURE: 139 MMHG | TEMPERATURE: 98 F | HEIGHT: 70 IN

## 2017-09-26 DIAGNOSIS — Z23 TETANUS TOXOID VACCINATION ADMINISTERED AT CURRENT VISIT: ICD-10-CM

## 2017-09-26 DIAGNOSIS — M79.671 RIGHT FOOT PAIN: ICD-10-CM

## 2017-09-26 DIAGNOSIS — T25.221A BURN, FOOT, SECOND DEGREE, RIGHT, INITIAL ENCOUNTER: Primary | ICD-10-CM

## 2017-09-26 LAB — POCT GLUCOSE: 122 MG/DL (ref 70–110)

## 2017-09-26 PROCEDURE — 63600175 PHARM REV CODE 636 W HCPCS: Performed by: PHYSICIAN ASSISTANT

## 2017-09-26 PROCEDURE — 96372 THER/PROPH/DIAG INJ SC/IM: CPT

## 2017-09-26 PROCEDURE — 25000003 PHARM REV CODE 250: Performed by: PHYSICIAN ASSISTANT

## 2017-09-26 PROCEDURE — 90715 TDAP VACCINE 7 YRS/> IM: CPT | Performed by: PHYSICIAN ASSISTANT

## 2017-09-26 PROCEDURE — 16020 DRESS/DEBRID P-THICK BURN S: CPT

## 2017-09-26 PROCEDURE — 90471 IMMUNIZATION ADMIN: CPT | Performed by: PHYSICIAN ASSISTANT

## 2017-09-26 PROCEDURE — 99284 EMERGENCY DEPT VISIT MOD MDM: CPT | Mod: 25

## 2017-09-26 RX ORDER — HYDROCODONE BITARTRATE AND IBUPROFEN 7.5; 2 MG/1; MG/1
1 TABLET, FILM COATED ORAL EVERY 8 HOURS PRN
Qty: 10 TABLET | Refills: 0 | Status: SHIPPED | OUTPATIENT
Start: 2017-09-26 | End: 2019-07-02 | Stop reason: ALTCHOICE

## 2017-09-26 RX ORDER — CLINDAMYCIN HYDROCHLORIDE 150 MG/1
300 CAPSULE ORAL EVERY 8 HOURS
Qty: 42 CAPSULE | Refills: 0 | Status: SHIPPED | OUTPATIENT
Start: 2017-09-26 | End: 2017-10-03

## 2017-09-26 RX ORDER — SILVER SULFADIAZINE 10 G/1000G
1 CREAM TOPICAL
Status: COMPLETED | OUTPATIENT
Start: 2017-09-26 | End: 2017-09-26

## 2017-09-26 RX ORDER — SILVER SULFADIAZINE 10 G/1000G
CREAM TOPICAL 2 TIMES DAILY
Qty: 30 G | Refills: 0 | Status: SHIPPED | OUTPATIENT
Start: 2017-09-26 | End: 2017-10-01

## 2017-09-26 RX ORDER — MEPERIDINE HYDROCHLORIDE 50 MG/ML
25 INJECTION INTRAMUSCULAR; INTRAVENOUS; SUBCUTANEOUS
Status: DISCONTINUED | OUTPATIENT
Start: 2017-09-26 | End: 2017-09-26 | Stop reason: HOSPADM

## 2017-09-26 RX ORDER — ONDANSETRON 2 MG/ML
4 INJECTION INTRAMUSCULAR; INTRAVENOUS
Status: COMPLETED | OUTPATIENT
Start: 2017-09-26 | End: 2017-09-26

## 2017-09-26 RX ADMIN — CLOSTRIDIUM TETANI TOXOID ANTIGEN (FORMALDEHYDE INACTIVATED), CORYNEBACTERIUM DIPHTHERIAE TOXOID ANTIGEN (FORMALDEHYDE INACTIVATED), BORDETELLA PERTUSSIS TOXOID ANTIGEN (GLUTARALDEHYDE INACTIVATED), BORDETELLA PERTUSSIS FILAMENTOUS HEMAGGLUTININ ANTIGEN (FORMALDEHYDE INACTIVATED), BORDETELLA PERTUSSIS PERTACTIN ANTIGEN, AND BORDETELLA PERTUSSIS FIMBRIAE 2/3 ANTIGEN 0.5 ML: 5; 2; 2.5; 5; 3; 5 INJECTION, SUSPENSION INTRAMUSCULAR at 11:09

## 2017-09-26 RX ADMIN — SILVER SULFADIAZINE 1 TUBE: 10 CREAM TOPICAL at 11:09

## 2017-09-26 RX ADMIN — ONDANSETRON 4 MG: 2 INJECTION INTRAMUSCULAR; INTRAVENOUS at 11:09

## 2017-09-26 NOTE — ED NOTES
Patient identifiers verified and correct for Chapo Jacobo.    LOC: The patient is awake, alert and aware of environment with an appropriate affect, the patient is oriented x 3 and speaking appropriately.  APPEARANCE: Patient resting comfortably and in no acute distress, patient is clean and well groomed, patient's clothing is properly fastened.  SKIN: The skin is warm and dry, color consistent with ethnicity, patient has normal skin turgor and moist mucus membranes, no breakdown or bruising noted. Large areas of redness noted to right foot, excoriated skin to right foot.   MUSCULOSKELETAL: Patient moving all extremities spontaneously.  RESPIRATORY: Airway is open and patent, respirations are spontaneous.  CARDIAC: Patient has a normal rate, no periphreal edema noted, capillary refill < 3 seconds.  ABDOMEN: Soft and non tender to palpation.    Silver sulfadene applied to right foot with non-adherent dressing and ACE bandage.   Attempted to contact patient's family members for a ride home. Messages left with Paola, patient's fiance. Patient unable to contact family members at this time. Patient's narcotic administration held.

## 2017-09-26 NOTE — ED NOTES
Patient could not obtain a ride home, Demerol not given. Patient discharged with Rx for pain medications and antibiotics. Patient informed of reason he would not be given narcotic medications.    Discharge instructions explained to patient. Patient verbalizes understanding. Patient and discharge paperwork escorted to registration desk by RN at this time. Discharge paperwork given to registration for completion of discharge.

## 2017-09-26 NOTE — ED PROVIDER NOTES
Encounter Date: 9/26/2017       History     Chief Complaint   Patient presents with    Burn     spilled hot grease on top of R foot on Fri     61-year-old male presents with burns to right foot x 4 days. He states grease from a cast iron skillet splashed onto his foot while cooking Friday night. He reports constant pain 10/10 for which he has tried applying aloe vera. Denies numbness, tingling, fever, chills, or purulent drainage. PMH includes Diabetes Mellitus type 2 and cigarette use. Last date of Tetanus vaccine unknown.           Review of patient's allergies indicates:   Allergen Reactions    Codeine      Other reaction(s): Unknown    Darvocet a500 [propoxyphene n-acetaminophen]     Paroxetine hcl      Other reaction(s): Unknown    Penicillins      Other reaction(s): Anaphylaxis     Past Medical History:   Diagnosis Date    Anxiety     Appendicitis     Depression     Diabetes mellitus     Encounter for blood transfusion     Gallbladder & bile duct stone with obstruction     Herpes     Sleep apnea      Past Surgical History:   Procedure Laterality Date    ABDOMINAL SURGERY      APPENDECTOMY      bilateral rotator cuff surgery      BRAIN SURGERY      CERVICAL FUSION      CHOLECYSTECTOMY      COLONOSCOPY N/A 7/10/2017    Procedure: COLONOSCOPY;  Surgeon: Viviana Reyes MD;  Location: [facility];  Service: Endoscopy;  Laterality: N/A;    LIVER TRANSPLANT      LUMBAR DISC SURGERY      TONSILLECTOMY       Family History   Problem Relation Age of Onset    Arthritis Mother     Melanoma Father     Cancer Father      melanoma    Fibromyalgia Sister     Cancer Maternal Grandfather      unknown type     Social History   Substance Use Topics    Smoking status: Current Every Day Smoker     Packs/day: 1.00     Years: 46.00     Types: Cigarettes    Smokeless tobacco: Never Used    Alcohol use 1.2 oz/week     2 Cans of beer per week     Review of Systems   Constitutional: Negative for chills,  diaphoresis and fever.   Gastrointestinal: Negative for diarrhea, nausea and vomiting.   Musculoskeletal: Negative for gait problem.        Right foot pain   Skin: Positive for wound.   Neurological: Negative for dizziness, weakness and numbness.   All other systems reviewed and are negative.      Physical Exam     Initial Vitals [09/26/17 0853]   BP Pulse Resp Temp SpO2   (!) 145/91 94 18 98.4 °F (36.9 °C) 96 %      MAP       109         Physical Exam    Vitals reviewed.  Constitutional: He appears well-developed and well-nourished. He is not diaphoretic. He is cooperative. He does not appear ill. No distress.   HENT:   Head: Normocephalic and atraumatic.   Mouth/Throat: Uvula is midline, oropharynx is clear and moist and mucous membranes are normal.   Eyes: Conjunctivae and EOM are normal. Pupils are equal, round, and reactive to light.   Neck: Trachea normal, normal range of motion and phonation normal. Neck supple. No tracheal deviation present.   Cardiovascular: Normal rate, regular rhythm and normal heart sounds. Exam reveals no gallop and no friction rub.    No murmur heard.  Pulmonary/Chest: Effort normal and breath sounds normal. No respiratory distress. He has no decreased breath sounds. He has no wheezes. He has no rhonchi. He has no rales.   Musculoskeletal:        Right ankle: He exhibits no ecchymosis, no deformity and normal pulse. No lateral malleolus and no medial malleolus tenderness found.   Full ROM right ankle. No bony deformity. Neurovascular function intact.    Neurological: He is alert and oriented to person, place, and time.   Normal strength CITLALY ankle and feet. No sensory deficit to right ankle and foot.   Skin: Skin is warm and dry. Capillary refill takes less than 2 seconds.   There are few non-circumferential burns to right medial malleolus and right dorsal foot less than 5cm in diameter.   There is 2cm x 2cm abrasion to dorsal right foot from previous blister with minimal bleeding and  no purulent drainage.   Mild edema distal to burns on right foot. No bruising, rash, laceration, or abscess.   Psychiatric: He has a normal mood and affect. His behavior is normal. Judgment and thought content normal.         ED Course   Procedures  Labs Reviewed - No data to display                            ED Course      Clinical Impression:   {Add your Clinical Impression here. If you haven't documented one yet, please pend the note, finalize a Clinical Impression, and refresh your note before signing.:[id number]}

## 2017-09-27 NOTE — ED PROVIDER NOTES
History      Chief Complaint   Patient presents with    Burn     spilled hot grease on top of R foot on Fri       Review of patient's allergies indicates:   Allergen Reactions    Codeine      Other reaction(s): Unknown    Darvocet a500 [propoxyphene n-acetaminophen]     Paroxetine hcl      Other reaction(s): Unknown    Penicillins      Other reaction(s): Anaphylaxis        HPI   HPI    9/27/2017, 11:44 AM   History obtained from the patient      History of Present Illness: Chapo Jacobo is a 61 y.o. male patient who presents to the Emergency Department for burns to right foot x 4 days. He states grease from a cast iron skillet splashed onto his foot while cooking Friday night. He reports constant pain 10/10 for which he has tried applying aloe vera.  He requests pain medication but says he can not have anything with tylenol bc of liver transplant.   Denies numbness, tingling, fever, chills, or purulent drainage. . Symptoms are moderate in severity.     No further complaints or concerns at this time.           PCP: Ismael Jackson MD       Past Medical History:  Past Medical History:   Diagnosis Date    Anxiety     Appendicitis     Depression     Diabetes mellitus     Encounter for blood transfusion     Gallbladder & bile duct stone with obstruction     Herpes     Sleep apnea          Past Surgical History:  Past Surgical History:   Procedure Laterality Date    ABDOMINAL SURGERY      APPENDECTOMY      bilateral rotator cuff surgery      BRAIN SURGERY      CERVICAL FUSION      CHOLECYSTECTOMY      COLONOSCOPY N/A 7/10/2017    Procedure: COLONOSCOPY;  Surgeon: Viviana Reyes MD;  Location: [facility];  Service: Endoscopy;  Laterality: N/A;    LIVER TRANSPLANT      LUMBAR DISC SURGERY      TONSILLECTOMY             Family History:  Family History   Problem Relation Age of Onset    Arthritis Mother     Melanoma Father     Cancer Father      melanoma    Fibromyalgia Sister     Cancer Maternal  Grandfather      unknown type           Social History:  Social History     Social History Main Topics    Smoking status: Current Every Day Smoker     Packs/day: 1.00     Years: 46.00     Types: Cigarettes    Smokeless tobacco: Never Used    Alcohol use 1.2 oz/week     2 Cans of beer per week    Drug use: No    Sexual activity: Yes     Partners: Female       ROS     Review of Systems   Constitutional: Negative for chills and fever.   HENT: Negative for sore throat.    Respiratory: Negative for shortness of breath.    Cardiovascular: Negative for chest pain.   Gastrointestinal: Negative for nausea and vomiting.   Genitourinary: Negative for dysuria.   Musculoskeletal: Negative for back pain.   Skin: Positive for wound. Negative for rash.   Neurological: Negative for weakness.   Hematological: Does not bruise/bleed easily.   All other systems reviewed and are negative.      Physical Exam      Initial Vitals [09/26/17 0853]   BP Pulse Resp Temp SpO2   (!) 145/91 94 18 98.4 °F (36.9 °C) 96 %      MAP       109         Physical Exam  Vital signs and nursing notes reviewed.  Constitutional: Patient is in NAD. Awake and alert. Well-developed and well-nourished.  Head: Atraumatic. Normocephalic.  Eyes: PERRL. EOM intact. Conjunctivae nl. No scleral icterus.  ENT: Mucous membranes are moist. Oropharynx is clear.  Neck: Supple. No JVD. No lymphadenopathy.  No meningismus  Cardiovascular: Regular rate and rhythm. No murmurs, rubs, or gallops. Distal pulses are 2+ and symmetric.  Pulmonary/Chest: No respiratory distress. Clear to auscultation bilaterally. No wheezing, rales, or rhonchi.  Abdominal: Soft. Non-distended. No TTP. No rebound, guarding, or rigidity. Good bowel sounds.  Genitourinary: No CVA tenderness  Musculoskeletal: Moves all extremities. No edema.   Skin: Warm and dry.  Right foot with 2 second degree burns to dorsum and one to medial ankle.  All approx 3x3cm.  None circumferential. No signs of infection.   "FROM of ankle and toes x 5.  2+dp pulse.  Normal sensation, and cap refill less than 2, to toes x 5.  Neurological: Awake and alert. No acute focal neurological deficits are appreciated.  Psychiatric: Normal affect. Good eye contact. Appropriate in content.      ED Course          Procedures  ED Vital Signs:  Vitals:    09/26/17 0853 09/26/17 1045 09/26/17 1300   BP: (!) 145/91 (!) 140/89 139/79   Pulse: 94 91 90   Resp: 18 20 17   Temp: 98.4 °F (36.9 °C)     TempSrc: Oral     SpO2: 96% 97% 99%   Weight: 99.8 kg (220 lb)     Height: 5' 10" (1.778 m)           Results for orders placed or performed during the hospital encounter of 09/26/17   POCT glucose   Result Value Ref Range    POCT Glucose 122 (H) 70 - 110 mg/dL             Imaging Results:  Imaging Results    None            The Emergency Provider reviewed the vital signs and test results, which are outlined above.    ED Discussion             Medication(s) given in the ER:  Medications   silver sulfADIAZINE 1% cream 1 Tube (1 Tube Topical (Top) Given 9/26/17 1103)   Tdap vaccine injection 0.5 mL (0.5 mLs Intramuscular Given 9/26/17 1103)   ondansetron injection 4 mg (4 mg Intramuscular Given 9/26/17 1104)           Follow-up Information     Ismael Jackson MD In 2 days.    Specialty:  Internal Medicine  Contact information:  [address] [place] [address] [phone]                       Discharge Medication List as of 9/26/2017 11:58 AM      START taking these medications    Details   clindamycin (CLEOCIN) 150 MG capsule Take 2 capsules (300 mg total) by mouth every 8 (eight) hours., Starting Tue 9/26/2017, Until Tue 10/3/2017, Print      silver sulfADIAZINE 1% (SILVADENE) 1 % cream Apply topically 2 (two) times daily., Starting Tue 9/26/2017, Until Sun 10/1/2017, Print                Medical Decision Making      Pt requested IM Demerol bc that has helped his pain in past and he initially said he was allergic to other pain meds.  Demerol ultimately " not given bc pt could not secure ride.  He requested Rx of demerol.  Will give hydrocodone-ibuprofen instead.  He agrees to monitor blood sugar and fu with pcp for wound check in 1-2 days.  All findings were reviewed with the patient/family in detail.   All remaining questions and concerns were addressed at that time.  Patient/family has been counseled regarding the need for follow-up as well as the indication to return to the emergency room should new or worrisome developments occur.        MDM               Clinical Impression:        ICD-10-CM ICD-9-CM   1. Burn, foot, second degree, right, initial encounter T25.221A 945.22   2. Right foot pain M79.671 729.5   3. Tetanus toxoid vaccination administered at current visit Z78.9 V49.89             Jacque Short PA-C  09/27/17 1151

## 2017-10-23 ENCOUNTER — TELEPHONE (OUTPATIENT)
Dept: TRANSPLANT | Facility: CLINIC | Age: 62
End: 2017-10-23

## 2017-10-23 NOTE — LETTER
October 23, 2017    Chapo Jacobo  [address] [facility] [address]          Dear Chapo Jacobo:  MRN: [id number]    Your lab work was due to be drawn on 8/7/17,9/14/17 and 10/16/17.  We contacted your lab and were unable to get test results.  It is very important to get your labs drawn as scheduled.  We cannot monitor you for rejection, infections, or drug toxicity side effects without lab results.  Please call us at [phone] as soon as possible to let us know when you plan to have labs drawn. This is the third missed lab appointment that you have missed, which means that we have not gotten lab results since 5/15/17. Please make all your effort to complete these labs as soon as possible. Per our transplant protocol, we will not fill any of your medication until we receive completed lab work.        Sincerely,      Racheal Choudhary, RN, BSN  Your Liver Transplant Coordinator    [facility]  [address] [place] [phone]

## 2017-10-23 NOTE — TELEPHONE ENCOUNTER
Called pt's contact number and his significant others number felipe navarro and his phone is disconnected and hers rang with no VM. Will send another missed lab letter.    Note: This will be the 3rd missed lab letter.

## 2017-11-10 ENCOUNTER — TELEPHONE (OUTPATIENT)
Dept: RADIOLOGY | Facility: HOSPITAL | Age: 62
End: 2017-11-10

## 2017-11-13 ENCOUNTER — TELEPHONE (OUTPATIENT)
Dept: GASTROENTEROLOGY | Facility: CLINIC | Age: 62
End: 2017-11-13

## 2017-11-14 ENCOUNTER — TELEPHONE (OUTPATIENT)
Dept: GASTROENTEROLOGY | Facility: CLINIC | Age: 62
End: 2017-11-14

## 2017-11-14 NOTE — TELEPHONE ENCOUNTER
Canceled appointment for 11/17/17 since no labs or ultrasound was done, need to reschedule appointment, SHANTEL.

## 2017-11-22 ENCOUNTER — TELEPHONE (OUTPATIENT)
Dept: TRANSPLANT | Facility: CLINIC | Age: 62
End: 2017-11-22

## 2017-11-22 NOTE — TELEPHONE ENCOUNTER
Called pt back and left a VM letting him know that I have scheduled labs for him on 11/24 at the [facility]. Instructed him to call me back if that day does not work and to find out more of how he is feeling.

## 2017-11-24 ENCOUNTER — TELEPHONE (OUTPATIENT)
Dept: TRANSPLANT | Facility: CLINIC | Age: 62
End: 2017-11-24

## 2017-11-24 NOTE — TELEPHONE ENCOUNTER
Left another  for pt because he called and stated that he needed labs done because he felt bad. Asked for a return call so that we could set up. It looks like he canceled a dr jean appt also. We will discuss behavior of noncompliance with care and follow up.

## 2017-11-27 ENCOUNTER — TELEPHONE (OUTPATIENT)
Dept: GASTROENTEROLOGY | Facility: CLINIC | Age: 62
End: 2017-11-27

## 2017-11-27 NOTE — TELEPHONE ENCOUNTER
Patient requesting approval from Dr. Reyes for a  shingles injection.  States his pharmacy will give it but will need approval due to liver issues.  Please advise.

## 2017-11-27 NOTE — TELEPHONE ENCOUNTER
----- Message from Korey Leo sent at 11/27/2017  3:31 PM CST -----  Contact: self [phone]  Would like to consult with nurse regarding an injection.  Please call back at [phone].  Md Radah

## 2017-11-28 NOTE — TELEPHONE ENCOUNTER
Since he had a liver transplant and the shingles vaccine is a live virus he CANNOT have the vaccine.

## 2017-12-14 ENCOUNTER — LAB VISIT (OUTPATIENT)
Dept: LAB | Facility: HOSPITAL | Age: 62
End: 2017-12-14
Attending: INTERNAL MEDICINE
Payer: MEDICARE

## 2017-12-14 DIAGNOSIS — Z94.4 LIVER TRANSPLANTED: ICD-10-CM

## 2017-12-14 DIAGNOSIS — K74.69 COMPENSATED HCV CIRRHOSIS: ICD-10-CM

## 2017-12-14 DIAGNOSIS — B19.20 COMPENSATED HCV CIRRHOSIS: ICD-10-CM

## 2017-12-14 LAB
ALBUMIN SERPL BCP-MCNC: 3.6 G/DL
ALP SERPL-CCNC: 79 U/L
ALT SERPL W/O P-5'-P-CCNC: 14 U/L
ANION GAP SERPL CALC-SCNC: 8 MMOL/L
AST SERPL-CCNC: 18 U/L
BASOPHILS # BLD AUTO: 0.09 K/UL
BASOPHILS NFR BLD: 1.1 %
BILIRUB SERPL-MCNC: 0.6 MG/DL
BUN SERPL-MCNC: 12 MG/DL
CALCIUM SERPL-MCNC: 9.6 MG/DL
CHLORIDE SERPL-SCNC: 100 MMOL/L
CO2 SERPL-SCNC: 29 MMOL/L
CREAT SERPL-MCNC: 1 MG/DL
DIFFERENTIAL METHOD: ABNORMAL
EOSINOPHIL # BLD AUTO: 0.4 K/UL
EOSINOPHIL NFR BLD: 4.4 %
ERYTHROCYTE [DISTWIDTH] IN BLOOD BY AUTOMATED COUNT: 13.2 %
EST. GFR  (AFRICAN AMERICAN): >60 ML/MIN/1.73 M^2
EST. GFR  (NON AFRICAN AMERICAN): >60 ML/MIN/1.73 M^2
GLUCOSE SERPL-MCNC: 131 MG/DL
HCT VFR BLD AUTO: 45 %
HGB BLD-MCNC: 15.7 G/DL
IMM GRANULOCYTES # BLD AUTO: 0.05 K/UL
IMM GRANULOCYTES NFR BLD AUTO: 0.6 %
INR PPP: 1
LYMPHOCYTES # BLD AUTO: 2.2 K/UL
LYMPHOCYTES NFR BLD: 27.9 %
MCH RBC QN AUTO: 32 PG
MCHC RBC AUTO-ENTMCNC: 34.9 G/DL
MCV RBC AUTO: 92 FL
MONOCYTES # BLD AUTO: 0.8 K/UL
MONOCYTES NFR BLD: 9.5 %
NEUTROPHILS # BLD AUTO: 4.5 K/UL
NEUTROPHILS NFR BLD: 56.5 %
NRBC BLD-RTO: 0 /100 WBC
PLATELET # BLD AUTO: 228 K/UL
PMV BLD AUTO: 12.7 FL
POTASSIUM SERPL-SCNC: 4.6 MMOL/L
PROT SERPL-MCNC: 8 G/DL
PROTHROMBIN TIME: 11 SEC
RBC # BLD AUTO: 4.91 M/UL
SODIUM SERPL-SCNC: 137 MMOL/L
WBC # BLD AUTO: 7.92 K/UL

## 2017-12-14 PROCEDURE — 85025 COMPLETE CBC W/AUTO DIFF WBC: CPT

## 2017-12-14 PROCEDURE — 80197 ASSAY OF TACROLIMUS: CPT

## 2017-12-14 PROCEDURE — 85610 PROTHROMBIN TIME: CPT

## 2017-12-14 PROCEDURE — 36415 COLL VENOUS BLD VENIPUNCTURE: CPT | Mod: PO

## 2017-12-14 PROCEDURE — 80053 COMPREHEN METABOLIC PANEL: CPT

## 2017-12-15 LAB — TACROLIMUS BLD-MCNC: 4.7 NG/ML

## 2017-12-19 ENCOUNTER — TELEPHONE (OUTPATIENT)
Dept: TRANSPLANT | Facility: CLINIC | Age: 62
End: 2017-12-19

## 2017-12-19 DIAGNOSIS — Z94.4 LIVER TRANSPLANTED: Primary | ICD-10-CM

## 2017-12-19 DIAGNOSIS — C22.0 HCC (HEPATOCELLULAR CARCINOMA): ICD-10-CM

## 2017-12-19 NOTE — TELEPHONE ENCOUNTER
----- Message from Viviana Reyes MD sent at 12/15/2017  4:26 PM CST -----  Reviewed, nothing to do

## 2017-12-19 NOTE — TELEPHONE ENCOUNTER
Labs reviewed and stable. Next labs due 3/26/18. Also noted that pt is overdue for liver u/s to monitor for HCC and no showed to dr jean appt. Will include all of this information in letter to be mailed to pt.

## 2017-12-19 NOTE — LETTER
December 19, 2017    Chaporin Jacobo  [address] [facility] [address]          Dear Chapo Jacobo:  MRN: [id number]    Your lab results were stable.  There are no medicine changes.  Please have your labs drawn again on 3/26/2018. *Also to note, you are overdue for a liver ultrasound that monitors for recurrent liver cancer, and you no-showed to the Dr. Reyes appointment that we discussed. Please call our schedulers at the number below as soon as possible to get the ultrasound rescheduled and to reschedule the Hepatology appointment.      If you cannot have your labs drawn on the scheduled date, it is your responsibility to call the transplant department to reschedule.  To reschedule or make an appointment, please as to speak to or leave a message for my assistant, Yuko Weston, at [phone].  When leaving a message for Trista Huntley, or myself, we ask that you leave a brief message regarding your request.    Sincerely,      Racheal Choudhary, RN, BSN  Your Liver Transplant Coordinator    [facility]  [address] [place] [id number] [phone]

## 2017-12-20 ENCOUNTER — TELEPHONE (OUTPATIENT)
Dept: GASTROENTEROLOGY | Facility: CLINIC | Age: 62
End: 2017-12-20

## 2017-12-20 NOTE — TELEPHONE ENCOUNTER
----- Message from Karen Vinson sent at 12/20/2017  4:06 PM CST -----  Contact: pt  The pt request a call, no additional info given, pt can be reached at [phone]///[id number]

## 2018-01-11 ENCOUNTER — TELEPHONE (OUTPATIENT)
Dept: TRANSPLANT | Facility: CLINIC | Age: 63
End: 2018-01-11

## 2018-01-11 NOTE — TELEPHONE ENCOUNTER
Returned call to pt. We reviewed his labs and next lab date. We also discussed that he is overdue to see Dr. Reyes for his annual Hepatology visit.

## 2018-02-02 ENCOUNTER — TELEPHONE (OUTPATIENT)
Dept: GASTROENTEROLOGY | Facility: CLINIC | Age: 63
End: 2018-02-02

## 2018-02-02 NOTE — TELEPHONE ENCOUNTER
----- Message from Shiva Arce sent at 2/2/2018  9:51 AM CST -----  Contact: Pt  Please give pt a call at ..[phone] (home) regarding an appt.

## 2018-02-08 ENCOUNTER — TELEPHONE (OUTPATIENT)
Dept: GASTROENTEROLOGY | Facility: CLINIC | Age: 63
End: 2018-02-08

## 2018-02-08 NOTE — TELEPHONE ENCOUNTER
----- Message from Carolynn Moncada sent at 2/8/2018  8:59 AM CST -----  Contact: pt  Calling to be worked into the schedule for a checkup at a sooner appointment other than 04/06/18 and please advise [phone]

## 2018-04-06 ENCOUNTER — TELEPHONE (OUTPATIENT)
Dept: GASTROENTEROLOGY | Facility: CLINIC | Age: 63
End: 2018-04-06

## 2018-04-06 NOTE — TELEPHONE ENCOUNTER
----- Message from Narinder Wall sent at 4/6/2018  1:11 PM CDT -----  Contact: pt  He's calling in regards to rescheduling his appt, [phone] (cell)

## 2018-07-24 DIAGNOSIS — Z94.4 S/P LIVER TRANSPLANT: Primary | ICD-10-CM

## 2018-07-24 RX ORDER — TACROLIMUS 1 MG/1
CAPSULE ORAL
Qty: 120 CAPSULE | Refills: 11 | Status: SHIPPED | OUTPATIENT
Start: 2018-07-24 | End: 2022-04-06 | Stop reason: SDUPTHER

## 2018-07-26 ENCOUNTER — TELEPHONE (OUTPATIENT)
Dept: TRANSPLANT | Facility: CLINIC | Age: 63
End: 2018-07-26

## 2018-07-26 NOTE — TELEPHONE ENCOUNTER
Received a prior auth form from pt's pharmacy. Called [employer] to do the PA over the phone.

## 2018-11-15 ENCOUNTER — TELEPHONE (OUTPATIENT)
Dept: GASTROENTEROLOGY | Facility: CLINIC | Age: 63
End: 2018-11-15

## 2018-11-15 NOTE — TELEPHONE ENCOUNTER
----- Message from Carolynn Moncada sent at 11/15/2018  8:46 AM CST -----  Contact: pt  Calling in regards to an appointment and please advise and want jung to call him back asap. [phone]

## 2019-02-27 ENCOUNTER — OFFICE VISIT (OUTPATIENT)
Dept: GASTROENTEROLOGY | Facility: CLINIC | Age: 64
End: 2019-02-27
Payer: MEDICARE

## 2019-02-27 ENCOUNTER — TELEPHONE (OUTPATIENT)
Dept: GASTROENTEROLOGY | Facility: CLINIC | Age: 64
End: 2019-02-27

## 2019-02-27 VITALS — HEIGHT: 70 IN | WEIGHT: 213.88 LBS | BODY MASS INDEX: 30.62 KG/M2

## 2019-02-27 DIAGNOSIS — Z94.4 LIVER TRANSPLANTED: ICD-10-CM

## 2019-02-27 DIAGNOSIS — K74.69 OTHER CIRRHOSIS OF LIVER: Primary | ICD-10-CM

## 2019-02-27 DIAGNOSIS — D84.9 IMMUNOSUPPRESSION: ICD-10-CM

## 2019-02-27 PROCEDURE — 3288F PR FALLS RISK ASSESSMENT DOCUMENTED: ICD-10-PCS | Mod: CPTII,S$GLB,, | Performed by: INTERNAL MEDICINE

## 2019-02-27 PROCEDURE — 1100F PTFALLS ASSESS-DOCD GE2>/YR: CPT | Mod: CPTII,S$GLB,, | Performed by: INTERNAL MEDICINE

## 2019-02-27 PROCEDURE — 99213 OFFICE O/P EST LOW 20 MIN: CPT | Mod: S$GLB,,, | Performed by: INTERNAL MEDICINE

## 2019-02-27 PROCEDURE — 3288F FALL RISK ASSESSMENT DOCD: CPT | Mod: CPTII,S$GLB,, | Performed by: INTERNAL MEDICINE

## 2019-02-27 PROCEDURE — 99213 PR OFFICE/OUTPT VISIT, EST, LEVL III, 20-29 MIN: ICD-10-PCS | Mod: S$GLB,,, | Performed by: INTERNAL MEDICINE

## 2019-02-27 PROCEDURE — 3008F BODY MASS INDEX DOCD: CPT | Mod: CPTII,S$GLB,, | Performed by: INTERNAL MEDICINE

## 2019-02-27 PROCEDURE — 99999 PR PBB SHADOW E&M-EST. PATIENT-LVL III: ICD-10-PCS | Mod: PBBFAC,,, | Performed by: INTERNAL MEDICINE

## 2019-02-27 PROCEDURE — 3008F PR BODY MASS INDEX (BMI) DOCUMENTED: ICD-10-PCS | Mod: CPTII,S$GLB,, | Performed by: INTERNAL MEDICINE

## 2019-02-27 PROCEDURE — 1100F PR PT FALLS ASSESS DOC 2+ FALLS/FALL W/INJURY/YR: ICD-10-PCS | Mod: CPTII,S$GLB,, | Performed by: INTERNAL MEDICINE

## 2019-02-27 PROCEDURE — 99999 PR PBB SHADOW E&M-EST. PATIENT-LVL III: CPT | Mod: PBBFAC,,, | Performed by: INTERNAL MEDICINE
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
## 2019-03-12 ENCOUNTER — HOSPITAL ENCOUNTER (OUTPATIENT)
Dept: RADIOLOGY | Facility: HOSPITAL | Age: 64
Discharge: HOME | End: 2019-03-12
Attending: INTERNAL MEDICINE
Payer: MEDICARE

## 2019-03-12 DIAGNOSIS — K74.69 OTHER CIRRHOSIS OF LIVER: ICD-10-CM

## 2019-03-12 PROCEDURE — 76705 ECHO EXAM OF ABDOMEN: CPT | Mod: TC

## 2019-03-14 ENCOUNTER — TELEPHONE (OUTPATIENT)
Dept: TRANSPLANT | Facility: CLINIC | Age: 64
End: 2019-03-14

## 2019-03-15 ENCOUNTER — TELEPHONE (OUTPATIENT)
Dept: TRANSPLANT | Facility: CLINIC | Age: 64
End: 2019-03-15

## 2019-03-15 NOTE — TELEPHONE ENCOUNTER
----- Message from Viviana Reyes MD sent at 3/13/2019  7:04 PM CDT -----  Labs looks good repeat per routine    MELD-Na score: 6 at 3/12/2019 10:52 AM  MELD score: 6 at 3/12/2019 10:52 AM  Calculated from:  Serum Creatinine: 0.9 mg/dL (Rounded to 1 mg/dL) at 3/12/2019 10:52 AM  Serum Sodium: 136 mmol/L at 3/12/2019 10:52 AM  Total Bilirubin: 0.6 mg/dL (Rounded to 1 mg/dL) at 3/12/2019 10:52 AM  INR(ratio): 1 at 3/12/2019 10:52 AM  Age: 63 years

## 2019-03-22 ENCOUNTER — TELEPHONE (OUTPATIENT)
Dept: TRANSPLANT | Facility: CLINIC | Age: 64
End: 2019-03-22

## 2019-03-22 NOTE — TELEPHONE ENCOUNTER
----- Message from Viviana Reyes MD sent at 3/21/2019 12:16 PM CDT -----  Reviewed, nothing to do; repeat per routine

## 2019-03-24 ENCOUNTER — HOSPITAL ENCOUNTER (EMERGENCY)
Facility: HOSPITAL | Age: 64
Discharge: HOME | End: 2019-03-24
Attending: EMERGENCY MEDICINE
Payer: MEDICARE

## 2019-03-24 VITALS
HEART RATE: 86 BPM | WEIGHT: 212.5 LBS | TEMPERATURE: 97 F | SYSTOLIC BLOOD PRESSURE: 126 MMHG | OXYGEN SATURATION: 95 % | HEIGHT: 70 IN | DIASTOLIC BLOOD PRESSURE: 73 MMHG | BODY MASS INDEX: 30.42 KG/M2 | RESPIRATION RATE: 16 BRPM

## 2019-03-24 DIAGNOSIS — R21 GROIN RASH: Primary | ICD-10-CM

## 2019-03-24 PROCEDURE — 99283 EMERGENCY DEPT VISIT LOW MDM: CPT

## 2019-03-24 RX ORDER — DOXYCYCLINE 100 MG/1
100 CAPSULE ORAL 2 TIMES DAILY
Qty: 20 CAPSULE | Refills: 0 | Status: SHIPPED | OUTPATIENT
Start: 2019-03-24 | End: 2019-04-03

## 2019-03-25 NOTE — ED PROVIDER NOTES
"SCRIBE #1 NOTE: I, Jasmin Espinoza, am scribing for, and in the presence of, Gokul Leong MD. I have scribed the entire note.       History     Chief Complaint   Patient presents with    Groin Pain     states had rash to right groin for a month and past 2 weeks noticed lumps to area now.      Review of patient's allergies indicates:   Allergen Reactions    Codeine      Other reaction(s): Unknown    Darvocet a500 [propoxyphene n-acetaminophen]     Paroxetine hcl      Other reaction(s): Unknown    Penicillins      Other reaction(s): Anaphylaxis         History of Present Illness     HPI    3/24/2019, 11:20 PM  History obtained from the patient      History of Present Illness: Chapo Jacobo is a 63 y.o. male patient with a PMHx of DM who presents to the Emergency Department for evaluation of rash to R groin area which onset gradually 1 month ago. Pt reports he was evaluated by dermatology and she "didn't know what was wrong." Symptoms are constant and moderate in severity.  No mitigating or exacerbating factors reported. Associated sxs include itchiness to rash area. Patient denies any fever, chills, increased warmth/swelling, testicular pain, penile pain, scrotal swelling, and all other sxs at this time. No further complaints or concerns at this time.       Arrival mode: Personal vehicle    PCP: Ismael Jackson MD        Past Medical History:  Past Medical History:   Diagnosis Date    Anxiety     Appendicitis     Depression     Diabetes mellitus     Encounter for blood transfusion     Gallbladder & bile duct stone with obstruction     Herpes     Sleep apnea        Past Surgical History:  Past Surgical History:   Procedure Laterality Date    ABDOMINAL SURGERY      APPENDECTOMY      bilateral rotator cuff surgery      BRAIN SURGERY      CERVICAL FUSION      CHOLECYSTECTOMY      COLONOSCOPY N/A 7/10/2017    [facility]    COLONOSCOPY N/A 12/23/2013    Performed by Viviana " ANGEL Reyes MD at [facility] ENDO    EGD (ESOPHAGOGASTRODUODENOSCOPY) N/A 12/23/2013    Performed by [facility] at [facility] ENDO    ESOPHAGOGASTRODUODENOSCOPY (EGD) N/A 7/10/2017    Performed by [facility] at [facility] ENDO    LIVER TRANSPLANT      LUMBAR DISC SURGERY      TONSILLECTOMY           Family History:  Family History   Problem Relation Age of Onset    Arthritis Mother     Melanoma Father     Cancer Father         melanoma    Fibromyalgia Sister     Cancer Maternal Grandfather         unknown type       Social History:  Social History     Tobacco Use    Smoking status: Current Every Day Smoker     Packs/day: 1.00     Years: 46.00     Pack years: 46.00     Types: Cigarettes    Smokeless tobacco: Never Used   Substance and Sexual Activity    Alcohol use: Yes     Alcohol/week: 1.2 oz     Types: 2 Cans of beer per week    Drug use: No    Sexual activity: Yes     Partners: Female        Review of Systems     Review of Systems   Constitutional: Negative for fever.   HENT: Negative for sore throat.    Respiratory: Negative for shortness of breath.    Cardiovascular: Negative for chest pain.   Gastrointestinal: Negative for nausea.   Genitourinary: Negative for dysuria, penile pain, penile swelling, scrotal swelling and testicular pain.   Musculoskeletal: Negative for back pain.   Skin: Positive for rash (R groin).        (+) itchiness to area   (-) increased swelling/warmth   Neurological: Negative for weakness.   Hematological: Does not bruise/bleed easily.   All other systems reviewed and are negative.       Physical Exam     Initial Vitals [03/24/19 2246]   BP Pulse Resp Temp SpO2   126/73 86 16 97.4 °F (36.3 °C) 95 %      MAP       --          Physical Exam  Nursing Notes and Vital Signs Reviewed.  Constitutional: Patient is in no acute distress. Well-developed and well-nourished.  Head: Atraumatic. Normocephalic.  Eyes: PERRL. EOM intact. Conjunctivae are not pale. No scleral icterus.  ENT: Mucous  "membranes are moist. Oropharynx is clear and symmetric.    Neck: Supple. Full ROM. No lymphadenopathy.  Cardiovascular: Regular rate. Regular rhythm. No murmurs, rubs, or gallops. Distal pulses are 2+ and symmetric.  Pulmonary/Chest: No respiratory distress. Clear to auscultation bilaterally. No wheezing or rales.  Abdominal: Soft and non-distended.  There is no tenderness.  No rebound, guarding, or rigidity. Good bowel sounds.  : External inspection is normal.  R inguinal crease with maculopapular violet rash with indurated follicles. No penile discharge. Normal bilateral testicular lie and position. Scrotum and testes appear normal with no discoloration. No scrotal, testicular, or epididymal tenderness.  Musculoskeletal: Moves all extremities. No obvious deformities. No edema. No calf tenderness.  Skin: Warm and dry.  Neurological:  Alert, awake, and appropriate.  Normal speech.  No acute focal neurological deficits are appreciated.  Psychiatric: Normal affect. Good eye contact. Appropriate in content.     ED Course   Procedures  ED Vital Signs:  Vitals:    03/24/19 2246   BP: 126/73   Pulse: 86   Resp: 16   Temp: 97.4 °F (36.3 °C)   TempSrc: Oral   SpO2: 95%   Weight: 96.4 kg (212 lb 8.4 oz)   Height: 5' 10" (1.778 m)              The Emergency Provider reviewed the vital signs and test results, which are outlined above.     ED Discussion     11:38 PM:  Discussed with pt all pertinent ED information. Discussed pt dx and plan of tx. Gave pt all f/u and return to the ED instructions. All questions and concerns were addressed at this time. Pt expresses understanding of information and instructions, and is comfortable with plan to discharge. Pt is stable for discharge.    I discussed with patient and/or family/caretaker that evaluation in the ED does not suggest any emergent or life threatening medical conditions requiring immediate intervention beyond what was provided in the ED, and I believe patient is safe for " discharge.  Regardless, an unremarkable evaluation in the ED does not preclude the development or presence of a serious of life threatening condition. As such, patient was instructed to return immediately for any worsening or change in current symptoms.      ED Medication(s):  Medications - No data to display    Discharge Medication List as of 3/24/2019 11:34 PM      START taking these medications    Details   doxycycline (VIBRAMYCIN) 100 MG Cap Take 1 capsule (100 mg total) by mouth 2 (two) times daily. for 10 days, Starting Sun 3/24/2019, Until Wed 4/3/2019, Print             Follow-up Information     Ismael Jackson MD In 1 week.    Specialty:  Internal Medicine  Contact information:  [address] [place] [id number] [phone]             your dermatologist In 10 days.                                   Scribe Attestation:   Scribe #1: I performed the above scribed service and the documentation accurately describes the services I performed. I attest to the accuracy of the note.     Attending:   Physician Attestation Statement for Scribe #1: I, Gokul Leong MD, personally performed the services described in this documentation, as scribed by Jasmin Espinoza, in my presence, and it is both accurate and complete.           Clinical Impression       ICD-10-CM ICD-9-CM   1. Groin rash R21 782.1       Disposition:   Disposition: Discharged  Condition: Stable         Gokul Leong MD  03/25/19 9781

## 2019-03-27 DIAGNOSIS — Z94.4 LIVER TRANSPLANTED: Primary | ICD-10-CM

## 2019-04-17 ENCOUNTER — TELEPHONE (OUTPATIENT)
Dept: GASTROENTEROLOGY | Facility: CLINIC | Age: 64
End: 2019-04-17

## 2019-04-17 ENCOUNTER — TELEPHONE (OUTPATIENT)
Dept: TRANSPLANT | Facility: CLINIC | Age: 64
End: 2019-04-17

## 2019-04-17 NOTE — TELEPHONE ENCOUNTER
"Patient asked if he can take Iodine (a liquid to mix in water) , he said he heard it on the radio that our bodies need Iodine so he ordered " a couple of bottles".  He also said he is taking supplements : "Turbo Charge supplement drink" and "Brain Force pills". Will ask Pharm D. But informed patient that these may not be necessary or safe. He voiced understanding.  "

## 2019-04-17 NOTE — TELEPHONE ENCOUNTER
Pt called, asking if it was okay to drink iodine solution due to his liver transplant history. Would like an answer as soon as possible. DAMIAN

## 2019-04-18 ENCOUNTER — TELEPHONE (OUTPATIENT)
Dept: TRANSPLANT | Facility: CLINIC | Age: 64
End: 2019-04-18

## 2019-04-18 NOTE — TELEPHONE ENCOUNTER
Patient notified and instructed : do not take the Iodine, it is not necessary unless has an iodine deficiency; and stop taking the Brain Force pills, could have harmful side effects if mixes with other medications,  And advised to send in the label of the Turbo supplemental drink so it can be reviewed.

## 2019-04-18 NOTE — TELEPHONE ENCOUNTER
----- Message from Flor Murrell PharmD sent at 4/18/2019  8:10 AM CDT -----  I would avoid the iodine (more that it is not necessary unless he has a known iodine deficiency. As far as the Brain force pills I would also advise against these as they have ingredients that could potentially cause harmful side effects when combined with a couple of he other medications. For the supplemental drink I would need to see the label with ingredients as I am not sure which one it is for sure.     ----- Message -----  From: Yasmeen Segovia  Sent: 4/17/2019   4:06 PM  To: Abdominal Transplant Pharmacists    Patient asking if he can take Iodine (bought Iodine liquid to put in water. He said he heard it  On the radio that our bodies need Iodine , he also asked about Turbo Charge supplemental drink and Brain Force (pills) .  I told him I would ask but not sure these are necessary or safe.  Thanks.

## 2019-07-02 ENCOUNTER — HOSPITAL ENCOUNTER (EMERGENCY)
Facility: HOSPITAL | Age: 64
Discharge: HOME | End: 2019-07-02
Attending: EMERGENCY MEDICINE
Payer: MEDICARE

## 2019-07-02 VITALS
RESPIRATION RATE: 13 BRPM | OXYGEN SATURATION: 93 % | TEMPERATURE: 99 F | DIASTOLIC BLOOD PRESSURE: 57 MMHG | SYSTOLIC BLOOD PRESSURE: 113 MMHG | HEART RATE: 68 BPM

## 2019-07-02 DIAGNOSIS — R25.2 BILATERAL LEG CRAMPS: ICD-10-CM

## 2019-07-02 DIAGNOSIS — T67.2XXA HEAT CRAMPS, INITIAL ENCOUNTER: Primary | ICD-10-CM

## 2019-07-02 LAB
ALBUMIN SERPL BCP-MCNC: 3.6 G/DL (ref 3.5–5.2)
ALP SERPL-CCNC: 79 U/L (ref 55–135)
ALT SERPL W/O P-5'-P-CCNC: 30 U/L (ref 10–44)
ANION GAP SERPL CALC-SCNC: 9 MMOL/L (ref 8–16)
AST SERPL-CCNC: 30 U/L (ref 10–40)
BASOPHILS # BLD AUTO: 0.04 K/UL (ref 0–0.2)
BASOPHILS NFR BLD: 0.7 % (ref 0–1.9)
BILIRUB SERPL-MCNC: 0.6 MG/DL (ref 0.1–1)
BILIRUB UR QL STRIP: NEGATIVE
BUN SERPL-MCNC: 11 MG/DL (ref 8–23)
CALCIUM SERPL-MCNC: 8.9 MG/DL (ref 8.7–10.5)
CHLORIDE SERPL-SCNC: 103 MMOL/L (ref 95–110)
CK SERPL-CCNC: 416 U/L (ref 20–200)
CLARITY UR: CLEAR
CO2 SERPL-SCNC: 26 MMOL/L (ref 23–29)
COLOR UR: YELLOW
CREAT SERPL-MCNC: 0.8 MG/DL (ref 0.5–1.4)
DIFFERENTIAL METHOD: ABNORMAL
EOSINOPHIL # BLD AUTO: 0.2 K/UL (ref 0–0.5)
EOSINOPHIL NFR BLD: 3 % (ref 0–8)
ERYTHROCYTE [DISTWIDTH] IN BLOOD BY AUTOMATED COUNT: 14.5 % (ref 11.5–14.5)
EST. GFR  (AFRICAN AMERICAN): >60 ML/MIN/1.73 M^2
EST. GFR  (NON AFRICAN AMERICAN): >60 ML/MIN/1.73 M^2
GLUCOSE SERPL-MCNC: 117 MG/DL (ref 70–110)
GLUCOSE UR QL STRIP: NEGATIVE
HCT VFR BLD AUTO: 47.5 % (ref 40–54)
HGB BLD-MCNC: 17 G/DL (ref 14–18)
HGB UR QL STRIP: NEGATIVE
KETONES UR QL STRIP: NEGATIVE
LEUKOCYTE ESTERASE UR QL STRIP: NEGATIVE
LYMPHOCYTES # BLD AUTO: 1.3 K/UL (ref 1–4.8)
LYMPHOCYTES NFR BLD: 21.3 % (ref 18–48)
MCH RBC QN AUTO: 33.7 PG (ref 27–31)
MCHC RBC AUTO-ENTMCNC: 35.8 G/DL (ref 32–36)
MCV RBC AUTO: 94 FL (ref 82–98)
MONOCYTES # BLD AUTO: 0.5 K/UL (ref 0.3–1)
MONOCYTES NFR BLD: 8.3 % (ref 4–15)
NEUTROPHILS # BLD AUTO: 4 K/UL (ref 1.8–7.7)
NEUTROPHILS NFR BLD: 67 % (ref 38–73)
NITRITE UR QL STRIP: NEGATIVE
PH UR STRIP: 6 [PH] (ref 5–8)
PLATELET # BLD AUTO: 158 K/UL (ref 150–350)
PMV BLD AUTO: 10.2 FL (ref 9.2–12.9)
POTASSIUM SERPL-SCNC: 4.1 MMOL/L (ref 3.5–5.1)
PROT SERPL-MCNC: 6.9 G/DL (ref 6–8.4)
PROT UR QL STRIP: NEGATIVE
RBC # BLD AUTO: 5.04 M/UL (ref 4.6–6.2)
SODIUM SERPL-SCNC: 138 MMOL/L (ref 136–145)
SP GR UR STRIP: <=1.005 (ref 1–1.03)
TROPONIN I SERPL DL<=0.01 NG/ML-MCNC: 0.02 NG/ML (ref 0–0.03)
URN SPEC COLLECT METH UR: ABNORMAL
UROBILINOGEN UR STRIP-ACNC: NEGATIVE EU/DL
WBC # BLD AUTO: 6.05 K/UL (ref 3.9–12.7)

## 2019-07-02 PROCEDURE — 36415 COLL VENOUS BLD VENIPUNCTURE: CPT

## 2019-07-02 PROCEDURE — 93010 EKG 12-LEAD: ICD-10-PCS | Mod: ,,, | Performed by: INTERNAL MEDICINE

## 2019-07-02 PROCEDURE — 86703 HIV-1/HIV-2 1 RESULT ANTBDY: CPT

## 2019-07-02 PROCEDURE — 85025 COMPLETE CBC W/AUTO DIFF WBC: CPT

## 2019-07-02 PROCEDURE — 80053 COMPREHEN METABOLIC PANEL: CPT

## 2019-07-02 PROCEDURE — 93005 ELECTROCARDIOGRAM TRACING: CPT

## 2019-07-02 PROCEDURE — 84484 ASSAY OF TROPONIN QUANT: CPT

## 2019-07-02 PROCEDURE — 82550 ASSAY OF CK (CPK): CPT

## 2019-07-02 PROCEDURE — 93010 ELECTROCARDIOGRAM REPORT: CPT | Mod: ,,, | Performed by: INTERNAL MEDICINE

## 2019-07-02 PROCEDURE — 96375 TX/PRO/DX INJ NEW DRUG ADDON: CPT

## 2019-07-02 PROCEDURE — 96361 HYDRATE IV INFUSION ADD-ON: CPT

## 2019-07-02 PROCEDURE — 99284 EMERGENCY DEPT VISIT MOD MDM: CPT | Mod: 25

## 2019-07-02 PROCEDURE — 25000003 PHARM REV CODE 250: Performed by: EMERGENCY MEDICINE

## 2019-07-02 PROCEDURE — 96374 THER/PROPH/DIAG INJ IV PUSH: CPT

## 2019-07-02 PROCEDURE — 81003 URINALYSIS AUTO W/O SCOPE: CPT

## 2019-07-02 PROCEDURE — 63600175 PHARM REV CODE 636 W HCPCS: Performed by: EMERGENCY MEDICINE

## 2019-07-02 RX ORDER — LEVOCETIRIZINE DIHYDROCHLORIDE 5 MG/1
5 TABLET, FILM COATED ORAL NIGHTLY
COMMUNITY

## 2019-07-02 RX ORDER — TRAMADOL HYDROCHLORIDE 50 MG/1
50 TABLET ORAL EVERY 6 HOURS PRN
Qty: 12 TABLET | Refills: 0 | Status: SHIPPED | OUTPATIENT
Start: 2019-07-02 | End: 2019-07-12

## 2019-07-02 RX ORDER — TAMSULOSIN HYDROCHLORIDE 0.4 MG/1
0.4 CAPSULE ORAL DAILY
Qty: 30 CAPSULE | Refills: 0 | Status: SHIPPED | OUTPATIENT
Start: 2019-07-02 | End: 2023-07-07

## 2019-07-02 RX ORDER — LOSARTAN POTASSIUM 50 MG/1
50 TABLET ORAL DAILY
COMMUNITY

## 2019-07-02 RX ORDER — CYCLOBENZAPRINE HCL 10 MG
5 TABLET ORAL 3 TIMES DAILY PRN
Qty: 15 TABLET | Refills: 0 | Status: SHIPPED | OUTPATIENT
Start: 2019-07-02

## 2019-07-02 RX ORDER — MORPHINE SULFATE 4 MG/ML
4 INJECTION, SOLUTION INTRAMUSCULAR; INTRAVENOUS
Status: COMPLETED | OUTPATIENT
Start: 2019-07-02 | End: 2019-07-02

## 2019-07-02 RX ORDER — TRIAMCINOLONE ACETONIDE 1 MG/G
CREAM TOPICAL
Refills: 3 | COMMUNITY
Start: 2019-03-27 | End: 2019-07-02 | Stop reason: ALTCHOICE

## 2019-07-02 RX ORDER — TAMSULOSIN HYDROCHLORIDE 0.4 MG/1
0.4 CAPSULE ORAL
Status: COMPLETED | OUTPATIENT
Start: 2019-07-02 | End: 2019-07-02

## 2019-07-02 RX ADMIN — LORAZEPAM 1 MG: 2 INJECTION INTRAMUSCULAR; INTRAVENOUS at 03:07

## 2019-07-02 RX ADMIN — SODIUM CHLORIDE 1000 ML: 0.9 INJECTION, SOLUTION INTRAVENOUS at 03:07

## 2019-07-02 RX ADMIN — MORPHINE SULFATE 4 MG: 4 INJECTION INTRAVENOUS at 04:07

## 2019-07-02 RX ADMIN — TAMSULOSIN HYDROCHLORIDE 0.4 MG: 0.4 CAPSULE ORAL at 05:07

## 2019-07-02 NOTE — ED NOTES
Family at bedside, educated on medications given and monitoring to ensure patient is not overmedicated.

## 2019-07-02 NOTE — ED PROVIDER NOTES
SCRIBE #1 NOTE: I, Waldo Rob, am scribing for, and in the presence of, Jarod Harding Jr., MD. I have scribed the entire note.       History     Chief Complaint   Patient presents with    muscle cramps     Review of patient's allergies indicates:   Allergen Reactions    Codeine      Other reaction(s): Unknown    Darvocet a500 [propoxyphene n-acetaminophen]     Paroxetine hcl      Other reaction(s): Unknown    Penicillins      Other reaction(s): Anaphylaxis         History of Present Illness     HPI    7/2/2019, 3:00 PM  History obtained from the patient      History of Present Illness: Chapo Jacobo is a 63 y.o. male patient with a PMHx of anxiety, depression, DM, herpes, and sleep apnea who presents to the Emergency Department for evaluation of muscle cramps which onset gradually last night. Pt states he was working outside cutting down a tree yesterday. Pt reports she began experiencing BLE muscle cramps and chest pain last night. Symptoms are constant and moderate in severity. No mitigating or exacerbating factors reported. No other sxs reported. Patient denies any SOB, diaphoresis, palpitations, extremity weakness/numbness, leg swelling, dizziness, cough, N/V, and all other sxs at this time. Pt states his urine is yellow in color. No further complaints or concerns at this time.         Arrival mode: EMS     PCP: Ismael Jackson MD        Past Medical History:  Past Medical History:   Diagnosis Date    Anxiety     Appendicitis     Depression     Diabetes mellitus     Encounter for blood transfusion     Gallbladder & bile duct stone with obstruction     Herpes     Sleep apnea        Past Surgical History:  Past Surgical History:   Procedure Laterality Date    ABDOMINAL SURGERY      APPENDECTOMY      bilateral rotator cuff surgery      BRAIN SURGERY      CERVICAL FUSION      CHOLECYSTECTOMY      COLONOSCOPY N/A 7/10/2017    [facility]    COLONOSCOPY N/A  12/23/2013    [facility] at [facility]    EGD (ESOPHAGOGASTRODUODENOSCOPY) N/A 12/23/2013    Per[facility] at [facility] END[facility]    ESOPHAGOGASTRODUODENOSCOPY (EGD) N/A 7/10/2017    Per[facility] by [facility] at [facility] END[facility]    LIVER TRANSPLANT      LUMBAR DISC SURGERY      TONSILLECTOMY           Family History:  Family History   Problem Relation Age of Onset    Arthritis Mother     Melanoma Father     Cancer Father         melanoma    Fibromyalgia Sister     Cancer Maternal Grandfather         unknown type       Social History:  Social History     Tobacco Use    Smoking status: Current Every Day Smoker     Packs/day: 1.00     Years: 46.00     Pack years: 46.00     Types: Cigarettes    Smokeless tobacco: Never Used   Substance and Sexual Activity    Alcohol use: Yes     Alcohol/week: 1.2 oz     Types: 2 Cans of beer per week    Drug use: No    Sexual activity: Yes     Partners: Female        Review of Systems     Review of Systems   Constitutional: Negative for diaphoresis and fever.   HENT: Negative for sore throat.    Respiratory: Negative for cough and shortness of breath.    Cardiovascular: Positive for chest pain. Negative for palpitations.   Gastrointestinal: Negative for nausea and vomiting.   Genitourinary: Negative for dysuria.   Musculoskeletal: Negative for back pain.        (+) muscle cramping   Skin: Negative for rash.   Neurological: Negative for dizziness and weakness.   Hematological: Does not bruise/bleed easily.   All other systems reviewed and are negative.       Physical Exam     Initial Vitals [07/02/19 1454]   BP Pulse Resp Temp SpO2   (!) 142/88 80 18 97.6 °F (36.4 °C) 97 %      MAP       --          Physical Exam  Nursing Notes and Vital Signs Reviewed.  Constitutional: Patient is in no apparent distress. Well-developed and well-nourished.  Head: Atraumatic. Normocephalic.  Eyes: PERRL. EOM intact. Conjunctivae are not pale. No scleral icterus.  ENT:  Mucous membranes are moist. Oropharynx is clear and symmetric.    Neck: Supple. Full ROM. No lymphadenopathy.  Cardiovascular: Regular rate. Regular rhythm. No murmurs, rubs, or gallops. Distal pulses are 2+ and symmetric.  Pulmonary/Chest: No respiratory distress. Clear to auscultation bilaterally. No wheezing or rales.  Abdominal: Soft and non-distended.  There is no tenderness.  No rebound, guarding, or rigidity. Good bowel sounds.  Genitourinary: No CVA tenderness  Musculoskeletal: Moves all extremities. No obvious deformities. No edema.  Skin: Warm and dry.  Neurological:  Alert, awake, and appropriate.  Normal speech.  No acute focal neurological deficits are appreciated.  Psychiatric: Normal affect. Good eye contact. Appropriate in content.     ED Course   Procedures  ED Vital Signs:  Vitals:    07/02/19 1454 07/02/19 1511 07/02/19 1533 07/02/19 1541   BP: (!) 142/88 (!) 146/79 117/67 121/71   Pulse: 80 76 73 74   Resp: 18  17 18   Temp: 97.6 °F (36.4 °C)      TempSrc: Oral      SpO2: 97% 98% 96% 95%    07/02/19 1601   BP: 118/71   Pulse: 73   Resp: 15   Temp:    TempSrc:    SpO2: 95%       Abnormal Lab Results:  Labs Reviewed   CBC W/ AUTO DIFFERENTIAL - Abnormal; Notable for the following components:       Result Value    Mean Corpuscular Hemoglobin 33.7 (*)     All other components within normal limits   COMPREHENSIVE METABOLIC PANEL - Abnormal; Notable for the following components:    Glucose 117 (*)     All other components within normal limits   CK - Abnormal; Notable for the following components:     (*)     All other components within normal limits   TROPONIN I   URINALYSIS, REFLEX TO URINE CULTURE   HIV 1 / 2 ANTIBODY        All Lab Results:  Results for orders placed or performed during the hospital encounter of 07/02/19   CBC auto differential   Result Value Ref Range    WBC 6.05 3.90 - 12.70 K/uL    RBC 5.04 4.60 - 6.20 M/uL    Hemoglobin 17.0 14.0 - 18.0 g/dL    Hematocrit 47.5 40.0 - 54.0  %    Mean Corpuscular Volume 94 82 - 98 fL    Mean Corpuscular Hemoglobin 33.7 (H) 27.0 - 31.0 pg    Mean Corpuscular Hemoglobin Conc 35.8 32.0 - 36.0 g/dL    RDW 14.5 11.5 - 14.5 %    Platelets 158 150 - 350 K/uL    MPV 10.2 9.2 - 12.9 fL    Gran # (ANC) 4.0 1.8 - 7.7 K/uL    Lymph # 1.3 1.0 - 4.8 K/uL    Mono # 0.5 0.3 - 1.0 K/uL    Eos # 0.2 0.0 - 0.5 K/uL    Baso # 0.04 0.00 - 0.20 K/uL    Gran% 67.0 38.0 - 73.0 %    Lymph% 21.3 18.0 - 48.0 %    Mono% 8.3 4.0 - 15.0 %    Eosinophil% 3.0 0.0 - 8.0 %    Basophil% 0.7 0.0 - 1.9 %    Differential Method Automated    Comprehensive metabolic panel   Result Value Ref Range    Sodium 138 136 - 145 mmol/L    Potassium 4.1 3.5 - 5.1 mmol/L    Chloride 103 95 - 110 mmol/L    CO2 26 23 - 29 mmol/L    Glucose 117 (H) 70 - 110 mg/dL    BUN, Bld 11 8 - 23 mg/dL    Creatinine 0.8 0.5 - 1.4 mg/dL    Calcium 8.9 8.7 - 10.5 mg/dL    Total Protein 6.9 6.0 - 8.4 g/dL    Albumin 3.6 3.5 - 5.2 g/dL    Total Bilirubin 0.6 0.1 - 1.0 mg/dL    Alkaline Phosphatase 79 55 - 135 U/L    AST 30 10 - 40 U/L    ALT 30 10 - 44 U/L    Anion Gap 9 8 - 16 mmol/L    eGFR if African American >60 >60 mL/min/1.73 m^2    eGFR if non African American >60 >60 mL/min/1.73 m^2   Troponin I   Result Value Ref Range    Troponin I 0.017 0.000 - 0.026 ng/mL   CPK   Result Value Ref Range     (H) 20 - 200 U/L         Imaging Results:  Imaging Results    None          The EKG was ordered, reviewed, and independently interpreted by the ED provider.  Interpretation time: 1520  Rate: 78 BPM  Rhythm: normal sinus rhythm  Interpretation: Right bundle branch block. No STEMI.           The Emergency Provider reviewed the vital signs and test results, which are outlined above.     ED Discussion     4:47 PM: Reassessed pt at this time.  Pt states his condition has improved at this time. Discussed with pt all pertinent ED information and results. Discussed pt dx and plan of tx. Gave pt all f/u and return to the ED  instructions. All questions and concerns were addressed at this time. Pt expresses understanding of information and instructions, and is comfortable with plan to discharge. Pt is stable for discharge.    I discussed with patient and/or family/caretaker that evaluation in the ED does not suggest any emergent or life threatening medical conditions requiring immediate intervention beyond what was provided in the ED, and I believe patient is safe for discharge.  Regardless, an unremarkable evaluation in the ED does not preclude the development or presence of a serious of life threatening condition. As such, patient was instructed to return immediately for any worsening or change in current symptoms.    5:03 PM  Patient is stable nontoxic.  Has straw-colored urine here on visual inspection.  The patient has been exposed he has been having cramps in his legs.  CPK is a bit elevated but not concerning Jasmyn with normal creatinine.  Patient has been hydrated and is asymptomatic currently.  I have advised her avoid the heat for next 48 hr drink plenty of fluids and hydrate frequently with electrolyte solution at work.  Will treat symptomatically in the urine.  Patient verbalized understanding agreement with all seems reliable.  Patient ran out of his Flomax dose was provided with another prescription here.    ED Medication(s):  Medications   sodium chloride 0.9% bolus 1,000 mL (1,000 mLs Intravenous New Bag 7/2/19 1530)   lorazepam (ATIVAN) injection 1 mg (1 mg Intravenous Given 7/2/19 1530)   morphine injection 4 mg (4 mg Intravenous Given 7/2/19 1623)       New Prescriptions    No medications on file                 Medical Decision Making:   Clinical Tests:   Lab Tests: Ordered and Reviewed  Medical Tests: Ordered and Reviewed             Scribe Attestation:   Scribe #1: I performed the above scribed service and the documentation accurately describes the services I performed. I attest to the accuracy of the note.      Attending:   Physician Attestation Statement for Scribe #1: I, Jarod Harding Jr., MD, personally performed the services described in this documentation, as scribed by Waldo Rob, in my presence, and it is both accurate and complete.           Clinical Impression       ICD-10-CM ICD-9-CM   1. Bilateral leg cramps R25.2 729.82       Disposition:   Disposition: Discharged  Condition: Stable         Jarod Harding Jr., MD  07/02/19 5166

## 2019-07-03 LAB — HIV 1+2 AB+HIV1 P24 AG SERPL QL IA: NEGATIVE

## 2019-08-23 ENCOUNTER — TELEPHONE (OUTPATIENT)
Dept: RADIOLOGY | Facility: HOSPITAL | Age: 64
End: 2019-08-23

## 2019-09-04 ENCOUNTER — TELEPHONE (OUTPATIENT)
Dept: TRANSPLANT | Facility: CLINIC | Age: 64
End: 2019-09-04

## 2019-09-12 ENCOUNTER — TELEPHONE (OUTPATIENT)
Dept: RADIOLOGY | Facility: HOSPITAL | Age: 64
End: 2019-09-12

## 2019-09-13 ENCOUNTER — HOSPITAL ENCOUNTER (OUTPATIENT)
Dept: RADIOLOGY | Facility: HOSPITAL | Age: 64
Discharge: HOME | End: 2019-09-13
Attending: INTERNAL MEDICINE
Payer: MEDICARE

## 2019-09-13 DIAGNOSIS — K74.69 OTHER CIRRHOSIS OF LIVER: ICD-10-CM

## 2019-09-13 PROCEDURE — 76705 ECHO EXAM OF ABDOMEN: CPT | Mod: TC

## 2019-09-16 ENCOUNTER — TELEPHONE (OUTPATIENT)
Dept: TRANSPLANT | Facility: CLINIC | Age: 64
End: 2019-09-16

## 2019-09-16 DIAGNOSIS — Z94.4 LIVER TRANSPLANTED: Primary | ICD-10-CM

## 2019-09-16 DIAGNOSIS — K74.60 CIRRHOSIS OF LIVER WITHOUT ASCITES, UNSPECIFIED HEPATIC CIRRHOSIS TYPE: ICD-10-CM

## 2019-09-16 DIAGNOSIS — C22.0 HCC (HEPATOCELLULAR CARCINOMA): ICD-10-CM

## 2019-09-16 NOTE — TELEPHONE ENCOUNTER
----- Message from Viviana Reyes MD sent at 9/13/2019  4:37 PM CDT -----  Reviewed, nothing to do; repeat per routine

## 2019-09-16 NOTE — TELEPHONE ENCOUNTER
----- Message from Viviana Reyes MD sent at 9/13/2019  4:36 PM CDT -----  Labs ok awaiting immunosuppression level

## 2019-09-16 NOTE — LETTER
September 16, 2019    Chapo Odalis  [address] [address] [address]          Dear Chapo Jacobo:  MRN: [id number]    This is a follow up to your recent labs, your lab results were stable.  There are no medicine changes.  Please have your labs drawn again on 12/30/2019. Just a reminder that you have a clinic appointment with Dr Reyes in [place], 9/25/19 at 9am. We will have you scheduled for your overdue liver ultrasound at that time. Please make every effort to make appointment/ Please call [phone] if you are unable to make appointments.      If you cannot have your labs drawn on the scheduled date, it is your responsibility to call the transplant department to reschedule.  To reschedule or make an appointment, please as to speak to or leave a message for my assistant, Rosy Huntley or Racheal, at [phone].  When leaving a message for Rosy Huntley Angela or myself, we ask that you leave a brief message regarding your request.    Sincerely,      Racheal Choudhary, RN, BSN, CCTC  Your Liver Transplant Coordinator    [facility]  [address] [place] [id number] [phone]

## 2019-09-18 ENCOUNTER — TELEPHONE (OUTPATIENT)
Dept: TRANSPLANT | Facility: CLINIC | Age: 64
End: 2019-09-18

## 2019-09-19 ENCOUNTER — TELEPHONE (OUTPATIENT)
Dept: TRANSPLANT | Facility: CLINIC | Age: 64
End: 2019-09-19

## 2019-09-19 NOTE — TELEPHONE ENCOUNTER
----- Message from Viviana Reyes MD sent at 9/18/2019 11:04 AM CDT -----  Tacrolimus level reviewed.  Is stable from previous.  Repeat labs per routine.

## 2020-01-06 ENCOUNTER — TELEPHONE (OUTPATIENT)
Dept: TRANSPLANT | Facility: CLINIC | Age: 65
End: 2020-01-06

## 2020-01-09 ENCOUNTER — TELEPHONE (OUTPATIENT)
Dept: TRANSPLANT | Facility: CLINIC | Age: 65
End: 2020-01-09

## 2020-01-10 ENCOUNTER — TELEPHONE (OUTPATIENT)
Dept: TRANSPLANT | Facility: CLINIC | Age: 65
End: 2020-01-10

## 2020-01-20 ENCOUNTER — TELEPHONE (OUTPATIENT)
Dept: RADIOLOGY | Facility: HOSPITAL | Age: 65
End: 2020-01-20

## 2020-01-21 ENCOUNTER — HOSPITAL ENCOUNTER (OUTPATIENT)
Dept: RADIOLOGY | Facility: HOSPITAL | Age: 65
Discharge: HOME | End: 2020-01-21
Attending: INTERNAL MEDICINE
Payer: MEDICARE

## 2020-01-21 DIAGNOSIS — K74.60 CIRRHOSIS OF LIVER WITHOUT ASCITES, UNSPECIFIED HEPATIC CIRRHOSIS TYPE: ICD-10-CM

## 2020-01-21 DIAGNOSIS — Z94.4 LIVER TRANSPLANTED: ICD-10-CM

## 2020-01-21 PROCEDURE — 93975 VASCULAR STUDY: CPT | Mod: TC

## 2020-01-21 PROCEDURE — 76705 ECHO EXAM OF ABDOMEN: CPT | Mod: 26,XS,, | Performed by: RADIOLOGY

## 2020-01-21 PROCEDURE — 93975 US LIVER TRANSPLANT POST: ICD-10-PCS | Mod: 26,,, | Performed by: RADIOLOGY

## 2020-01-21 PROCEDURE — 76705 US LIVER TRANSPLANT POST: ICD-10-PCS | Mod: 26,XS,, | Performed by: RADIOLOGY

## 2020-01-21 PROCEDURE — 93975 VASCULAR STUDY: CPT | Mod: 26,,, | Performed by: RADIOLOGY

## 2020-01-27 ENCOUNTER — TELEPHONE (OUTPATIENT)
Dept: TRANSPLANT | Facility: CLINIC | Age: 65
End: 2020-01-27

## 2020-01-27 NOTE — TELEPHONE ENCOUNTER
----- Message from Viviana Reyes MD sent at 1/23/2020  7:12 PM CST -----  Reviewed, nothing to do; repeat per routine

## 2020-02-25 ENCOUNTER — NURSE TRIAGE (OUTPATIENT)
Dept: ADMINISTRATIVE | Facility: CLINIC | Age: 65
End: 2020-02-25

## 2020-02-25 DIAGNOSIS — Z76.89 ENCOUNTER TO ESTABLISH CARE: Primary | ICD-10-CM

## 2020-02-25 NOTE — TELEPHONE ENCOUNTER
Reason for Disposition   SEVERE chest pain    Additional Information   Negative: Severe difficulty breathing (e.g., struggling for each breath, speaks in single words)   Negative: Difficult to awaken or acting confused (e.g., disoriented, slurred speech)   Negative: Shock suspected (e.g., cold/pale/clammy skin, too weak to stand, low BP, rapid pulse)   Negative: [1] Chest pain lasts > 5 minutes AND [2] history of heart disease  (i.e., heart attack, bypass surgery, angina, angioplasty, CHF; not just a heart murmur)   Negative: [1] Chest pain lasts > 5 minutes AND [2] described as crushing, pressure-like, or heavy   Negative: [1] Chest pain lasts > 5 minutes AND [2] age > 50   Negative: [1] Chest pain lasts > 5 minutes AND [2] age > 30 AND [3] at least one cardiac risk factor (i.e., hypertension, diabetes, obesity, smoker or strong family history of heart disease)   Negative: [1] Chest pain lasts > 5 minutes AND [2] not relieved with nitroglycerin   Negative: Passed out (i.e., lost consciousness, collapsed and was not responding)   Negative: Heart beating < 50 beats per minute OR > 140 beats per minute   Negative: Visible sweat on face or sweat dripping down face   Negative: Sounds like a life-threatening emergency to the triager   Negative: Followed a chest injury    Protocols used: CHEST PAIN-A-AH    Pt stated he was told he will need open heart surgery in 6 months. Stated he is now having chest pain that comes and goes that he rates as a 9/10 on 1-10 pain scale. Per triage protocol, advised to go to ED now for evaluation and not to drive. Advised to identify himself as a Liver transplant Pt. Pt verbalized understanding. BPA for transplant Pt did not open in protocol.

## 2020-03-03 ENCOUNTER — OFFICE VISIT (OUTPATIENT)
Dept: CARDIOLOGY | Facility: CLINIC | Age: 65
End: 2020-03-03
Payer: MEDICARE

## 2020-03-03 ENCOUNTER — HOSPITAL ENCOUNTER (OUTPATIENT)
Dept: CARDIOLOGY | Facility: HOSPITAL | Age: 65
Discharge: HOME | End: 2020-03-03
Attending: INTERNAL MEDICINE
Payer: MEDICARE

## 2020-03-03 VITALS
DIASTOLIC BLOOD PRESSURE: 68 MMHG | HEIGHT: 70 IN | BODY MASS INDEX: 32.07 KG/M2 | HEART RATE: 82 BPM | SYSTOLIC BLOOD PRESSURE: 116 MMHG | WEIGHT: 224 LBS | OXYGEN SATURATION: 97 %

## 2020-03-03 DIAGNOSIS — Z72.0 TOBACCO ABUSE: ICD-10-CM

## 2020-03-03 DIAGNOSIS — I10 ESSENTIAL HYPERTENSION: ICD-10-CM

## 2020-03-03 DIAGNOSIS — R07.89 ATYPICAL CHEST PAIN: ICD-10-CM

## 2020-03-03 DIAGNOSIS — I45.10 RBBB: ICD-10-CM

## 2020-03-03 DIAGNOSIS — Z94.4 TRANSPLANTED LIVER: ICD-10-CM

## 2020-03-03 DIAGNOSIS — Z76.89 ENCOUNTER TO ESTABLISH CARE: ICD-10-CM

## 2020-03-03 DIAGNOSIS — E11.9 TYPE 2 DIABETES MELLITUS WITHOUT COMPLICATION, WITHOUT LONG-TERM CURRENT USE OF INSULIN: ICD-10-CM

## 2020-03-03 DIAGNOSIS — R94.31 ABNORMAL ECG: ICD-10-CM

## 2020-03-03 DIAGNOSIS — I71.21 ASCENDING AORTIC ANEURYSM: Primary | ICD-10-CM

## 2020-03-03 PROCEDURE — 93010 ELECTROCARDIOGRAM REPORT: CPT | Mod: ,,, | Performed by: INTERNAL MEDICINE

## 2020-03-03 PROCEDURE — 3074F PR MOST RECENT SYSTOLIC BLOOD PRESSURE < 130 MM HG: ICD-10-PCS | Mod: CPTII,S$GLB,, | Performed by: INTERNAL MEDICINE

## 2020-03-03 PROCEDURE — 99999 PR PBB SHADOW E&M-EST. PATIENT-LVL III: CPT | Mod: PBBFAC,,, | Performed by: INTERNAL MEDICINE

## 2020-03-03 PROCEDURE — 99999 PR PBB SHADOW E&M-EST. PATIENT-LVL III: ICD-10-PCS | Mod: PBBFAC,,, | Performed by: INTERNAL MEDICINE

## 2020-03-03 PROCEDURE — 3078F DIAST BP <80 MM HG: CPT | Mod: CPTII,S$GLB,, | Performed by: INTERNAL MEDICINE

## 2020-03-03 PROCEDURE — 3074F SYST BP LT 130 MM HG: CPT | Mod: CPTII,S$GLB,, | Performed by: INTERNAL MEDICINE

## 2020-03-03 PROCEDURE — 93005 ELECTROCARDIOGRAM TRACING: CPT

## 2020-03-03 PROCEDURE — 3078F PR MOST RECENT DIASTOLIC BLOOD PRESSURE < 80 MM HG: ICD-10-PCS | Mod: CPTII,S$GLB,, | Performed by: INTERNAL MEDICINE

## 2020-03-03 PROCEDURE — 93010 EKG 12-LEAD: ICD-10-PCS | Mod: ,,, | Performed by: INTERNAL MEDICINE

## 2020-03-03 PROCEDURE — 99204 OFFICE O/P NEW MOD 45 MIN: CPT | Mod: 25,S$GLB,, | Performed by: INTERNAL MEDICINE

## 2020-03-03 PROCEDURE — 1126F AMNT PAIN NOTED NONE PRSNT: CPT | Mod: S$GLB,,, | Performed by: INTERNAL MEDICINE

## 2020-03-03 PROCEDURE — 99204 PR OFFICE/OUTPT VISIT, NEW, LEVL IV, 45-59 MIN: ICD-10-PCS | Mod: 25,S$GLB,, | Performed by: INTERNAL MEDICINE

## 2020-03-03 PROCEDURE — 1126F PR PAIN SEVERITY QUANTIFIED, NO PAIN PRESENT: ICD-10-PCS | Mod: S$GLB,,, | Performed by: INTERNAL MEDICINE

## 2020-03-03 PROCEDURE — 1159F MED LIST DOCD IN RCRD: CPT | Mod: S$GLB,,, | Performed by: INTERNAL MEDICINE

## 2020-03-03 PROCEDURE — 1159F PR MEDICATION LIST DOCUMENTED IN MEDICAL RECORD: ICD-10-PCS | Mod: S$GLB,,, | Performed by: INTERNAL MEDICINE

## 2020-03-03 RX ORDER — ISOSORBIDE MONONITRATE 30 MG/1
30 TABLET, EXTENDED RELEASE ORAL DAILY
Qty: 30 TABLET | Refills: 11 | Status: SHIPPED | OUTPATIENT
Start: 2020-03-03 | End: 2023-07-07 | Stop reason: SDUPTHER

## 2020-03-03 NOTE — LETTER
March 3, 2020      No Recipients           [facility]  [address]  [place] [address]  Phone: [phone]  Fax: [phone]          Patient: Chapo Jacobo   MR Number: [id number]   YOB: 1955   Date of Visit: 3/3/2020       Dear :    Thank you for referring Chapo Jacobo to me for evaluation. Attached you will find relevant portions of my assessment and plan of care.    If you have questions, please do not hesitate to call me. I look forward to following Chapo Jacobo along with you.    Sincerely,    Scott Moncada MD    Enclosure  CC:  No Recipients    If you would like to receive this communication electronically, please contact [email or web address] or [phone] to request more information on [facility] access.    For providers and/or their staff who would like to refer a patient to [facility], please contact us through our one-stop-shop provider referral line, [facility], at [phone].    If you feel you have received this communication in error or would no longer like to receive these types of communications, please e-mail [email or web address]

## 2020-03-03 NOTE — PROGRESS NOTES
Subjective:    Patient ID:  Chapo Jacobo is a 64 y.o. male who presents for evaluation of Consult; Chest Pain; Coronary Artery Disease; and Risk Factor Management For Atherosclerosis      HPI  Pt presents for eval.  He has DM, HTN, RBBB, hyperlipidemia, liver transplant 20 years ago due to HCV.  + Smoker.   ecg today shows NSR, RBBB.  He states a doctor Dr. Thomas Clifton at [facility] recently saw him for ascending aortic aneurysm 4.3 cm.  F/u CT scan advised in 6 months for monitoring.  Pt is under severe stress because he thinks he needs surgery but all that seemed to be suggested for now was f/u CT 6 months per chart.  He has h/o liver transplant.  Had cp last week and called nurse and was told to go to ER and he had EMS go to house and ecg was ok so he never did go to hospital.  CP last week was a throbbing pain x 5 minutes.  No exertional type cp.  He is under a lot of stress in life.  Smokes 1 ppd.  He denies h/o CAD, CHF, CVA.  Some chronic IRIZARRY.     Current Outpatient Medications:     alprazolam (XANAX) 1 MG tablet, Take 1 mg by mouth 2 (two) times daily., Disp: , Rfl: 1    amphetamine-dextroamphetamine (ADDERALL) 30 mg Tab, Take 1 tablet by mouth as needed. , Disp: , Rfl:     cyclobenzaprine (FLEXERIL) 10 MG tablet, Take 0.5 tablets (5 mg total) by mouth 3 (three) times daily as needed for Muscle spasms., Disp: 15 tablet, Rfl: 0    fluoxetine (PROZAC) 20 MG capsule, Take 4 capsules by mouth Daily. , Disp: , Rfl:     glimepiride (AMARYL) 4 MG tablet, Take 1 tablet by mouth as needed. , Disp: , Rfl:     hydrocortisone 2.5 % cream, , Disp: , Rfl: 0    levocetirizine (XYZAL) 5 MG tablet, Take 5 mg by mouth every evening., Disp: , Rfl:     losartan (COZAAR) 50 MG tablet, Take 50 mg by mouth once daily., Disp: , Rfl:     metformin (GLUCOPHAGE) 1000 MG tablet, Take 1 tablet by mouth Daily., Disp: , Rfl:     valacyclovir (VALTREX) 500 MG tablet, Take 500 mg by mouth once daily. , Disp: , Rfl:     zolpidem  "(AMBIEN) 10 mg Tab, Take 5 mg by mouth nightly as needed., Disp: , Rfl:     isosorbide mononitrate (IMDUR) 30 MG 24 hr tablet, Take 1 tablet (30 mg total) by mouth once daily., Disp: 30 tablet, Rfl: 11    tacrolimus (PROGRAF) 1 MG Cap, TAKE 2 CAPSULES BY MOUTH TWICE DAILY, Disp: 120 capsule, Rfl: 11    tamsulosin (FLOMAX) 0.4 mg Cap, Take 1 capsule (0.4 mg total) by mouth once daily., Disp: 30 capsule, Rfl: 0      Review of Systems   Constitution: Negative.   HENT: Negative.    Eyes: Negative.    Cardiovascular: Positive for chest pain and dyspnea on exertion.   Respiratory: Positive for shortness of breath.    Endocrine: Negative.    Hematologic/Lymphatic: Negative.    Skin: Negative.    Musculoskeletal: Negative.    Gastrointestinal: Negative.    Genitourinary: Negative.    Neurological: Negative.    Psychiatric/Behavioral: Negative.    Allergic/Immunologic: Negative.        /68 (BP Location: Left arm, Patient Position: Sitting, BP Method: Large (Manual))   Pulse 82   Ht 5' 10" (1.778 m)   Wt 101.6 kg (224 lb)   SpO2 97%   BMI 32.14 kg/m²     Wt Readings from Last 3 Encounters:   03/03/20 101.6 kg (224 lb)   03/24/19 96.4 kg (212 lb 8.4 oz)   02/27/19 97 kg (213 lb 13.5 oz)     Temp Readings from Last 3 Encounters:   07/02/19 98.6 °F (37 °C) (Oral)   03/24/19 97.4 °F (36.3 °C) (Oral)   09/26/17 98.4 °F (36.9 °C) (Oral)     BP Readings from Last 3 Encounters:   03/03/20 116/68   07/02/19 (!) 113/57   03/24/19 126/73     Pulse Readings from Last 3 Encounters:   03/03/20 82   07/02/19 68   03/24/19 86          Objective:    Physical Exam   Constitutional: He is oriented to person, place, and time. He appears well-developed and well-nourished.   HENT:   Head: Normocephalic.   Neck: Normal range of motion. Neck supple. Normal carotid pulses, no hepatojugular reflux and no JVD present. Carotid bruit is not present. No thyromegaly present.   Cardiovascular: Normal rate, regular rhythm, S1 normal and S2 " normal. PMI is not displaced. Exam reveals no S3, no S4, no distant heart sounds, no friction rub, no midsystolic click and no opening snap.   No murmur heard.  Pulses:       Radial pulses are 2+ on the right side, and 2+ on the left side.   Pulmonary/Chest: Effort normal and breath sounds normal. He has no wheezes. He has no rales.   Abdominal: Soft. Bowel sounds are normal. He exhibits no distension, no abdominal bruit, no ascites and no mass. There is no tenderness.   Musculoskeletal: He exhibits no edema.   Neurological: He is alert and oriented to person, place, and time.   Skin: Skin is warm.   Psychiatric: He has a normal mood and affect. His behavior is normal.   Nursing note and vitals reviewed.      I have reviewed all pertinent labs and cardiac studies.      Chemistry        Component Value Date/Time     01/21/2020 0853    K 4.6 01/21/2020 0853     01/21/2020 0853    CO2 31 (H) 01/21/2020 0853    BUN 14 01/21/2020 0853    CREATININE 0.9 01/21/2020 0853     01/21/2020 0853        Component Value Date/Time    CALCIUM 9.5 01/21/2020 0853    ALKPHOS 72 01/21/2020 0853    AST 17 01/21/2020 0853    ALT 13 01/21/2020 0853    BILITOT 0.7 01/21/2020 0853    ESTGFRAFRICA >60.0 01/21/2020 0853    EGFRNONAA >60.0 01/21/2020 0853        Lab Results   Component Value Date    WBC 8.74 01/21/2020    HGB 17.1 01/21/2020    HCT 52.2 01/21/2020    MCV 99 (H) 01/21/2020     01/21/2020       Lab Results   Component Value Date    HGBA1C 5.7 11/01/2016     Lab Results   Component Value Date    CHOL 188 11/01/2016    CHOL 118 (L) 12/27/2007    CHOL 89 (L) 09/21/2006     Lab Results   Component Value Date    HDL 29 (L) 11/01/2016    HDL 24 (L) 12/27/2007    HDL 21.0 (L) 09/21/2006     Lab Results   Component Value Date    LDLCALC 109.8 11/01/2016    LDLCALC 57.8 (L) 12/27/2007    LDLCALC 29.6 (L) 09/21/2006     Lab Results   Component Value Date    TRIG 246 (H) 11/01/2016    TRIG 181 (H) 12/27/2007     TRIG 192 (H) 09/21/2006     Lab Results   Component Value Date    CHOLHDL 15.4 (L) 11/01/2016    CHOLHDL 20.3 12/27/2007    CHOLHDL 23.6 09/21/2006           Assessment:       1. Ascending aortic aneurysm    2. Transplanted liver    3. Abnormal ECG    4. Atypical chest pain    5. RBBB    6. Tobacco abuse    7. Essential hypertension    8. Type 2 diabetes mellitus without complication, without long-term current use of insulin         Plan:             Will have pt see Dr. Constantin Hendricks, CVT, for f/u of his ascending aortic aneurysm with f/u CT scan advised 6 months.  Stress MPI  Echocardiogram  ? Anginal sxs but at risk for CAD.  Cp may have been due to stress/anxiety over his aneurysm issue.  Add Imdur 30 mg qd.  Patient advised of indications for above medications, risks/benefits and side effect profile.  Continue other meds.  Pt advised to quit smoking.  Cardiac diet.  Keep DM HGAIC well controlled.  ? Statin in future -- h/o liver transplant.  F/u after above tests to review and make further tx decisions accordingly.

## 2020-03-17 ENCOUNTER — HOSPITAL ENCOUNTER (OUTPATIENT)
Dept: CARDIOLOGY | Facility: HOSPITAL | Age: 65
Discharge: HOME | End: 2020-03-17
Attending: INTERNAL MEDICINE
Payer: MEDICARE

## 2020-03-17 ENCOUNTER — HOSPITAL ENCOUNTER (OUTPATIENT)
Dept: RADIOLOGY | Facility: HOSPITAL | Age: 65
Discharge: HOME | End: 2020-03-17
Attending: INTERNAL MEDICINE
Payer: MEDICARE

## 2020-03-17 VITALS
DIASTOLIC BLOOD PRESSURE: 68 MMHG | SYSTOLIC BLOOD PRESSURE: 116 MMHG | HEART RATE: 75 BPM | WEIGHT: 224 LBS | BODY MASS INDEX: 32.07 KG/M2 | HEIGHT: 70 IN

## 2020-03-17 DIAGNOSIS — Z94.4 TRANSPLANTED LIVER: ICD-10-CM

## 2020-03-17 DIAGNOSIS — I71.21 ASCENDING AORTIC ANEURYSM: ICD-10-CM

## 2020-03-17 DIAGNOSIS — I10 ESSENTIAL HYPERTENSION: ICD-10-CM

## 2020-03-17 DIAGNOSIS — Z72.0 TOBACCO ABUSE: ICD-10-CM

## 2020-03-17 DIAGNOSIS — I45.10 RBBB: ICD-10-CM

## 2020-03-17 DIAGNOSIS — E11.9 TYPE 2 DIABETES MELLITUS WITHOUT COMPLICATION, WITHOUT LONG-TERM CURRENT USE OF INSULIN: ICD-10-CM

## 2020-03-17 DIAGNOSIS — R94.31 ABNORMAL ECG: ICD-10-CM

## 2020-03-17 DIAGNOSIS — R07.89 ATYPICAL CHEST PAIN: ICD-10-CM

## 2020-03-17 LAB
AV INDEX (PROSTH): 0.76
AV MEAN GRADIENT: 5 MMHG
AV PEAK GRADIENT: 10 MMHG
AV VALVE AREA: 2.46 CM2
AV VELOCITY RATIO: 0.89
BSA FOR ECHO PROCEDURE: 2.24 M2
CV ECHO LV RWT: 0.73 CM
CV PHARM DOSE: 0.4 MG
CV STRESS BASE HR: 78 BPM
DIASTOLIC BLOOD PRESSURE: 59 MMHG
DOP CALC AO PEAK VEL: 1.56 M/S
DOP CALC AO VTI: 34.49 CM
DOP CALC LVOT AREA: 3.2 CM2
DOP CALC LVOT DIAMETER: 2.03 CM
DOP CALC LVOT PEAK VEL: 1.39 M/S
DOP CALC LVOT STROKE VOLUME: 84.85 CM3
DOP CALCLVOT PEAK VEL VTI: 26.23 CM
E WAVE DECELERATION TIME: 334.01 MSEC
E/A RATIO: 0.8
E/E' RATIO: 7.4 M/S
ECHO LV POSTERIOR WALL: 1.52 CM (ref 0.6–1.1)
FRACTIONAL SHORTENING: 31 % (ref 28–44)
INTERVENTRICULAR SEPTUM: 1.28 CM (ref 0.6–1.1)
LA MAJOR: 3.89 CM
LA MINOR: 4.59 CM
LA WIDTH: 3.02 CM
LEFT ATRIUM SIZE: 3.22 CM
LEFT ATRIUM VOLUME INDEX: 15.9 ML/M2
LEFT ATRIUM VOLUME: 34.81 CM3
LEFT INTERNAL DIMENSION IN SYSTOLE: 2.89 CM (ref 2.1–4)
LEFT VENTRICLE DIASTOLIC VOLUME INDEX: 35.03 ML/M2
LEFT VENTRICLE DIASTOLIC VOLUME: 76.73 ML
LEFT VENTRICLE MASS INDEX: 101 G/M2
LEFT VENTRICLE SYSTOLIC VOLUME INDEX: 14.6 ML/M2
LEFT VENTRICLE SYSTOLIC VOLUME: 31.99 ML
LEFT VENTRICULAR INTERNAL DIMENSION IN DIASTOLE: 4.16 CM (ref 3.5–6)
LEFT VENTRICULAR MASS: 221.21 G
LV LATERAL E/E' RATIO: 7.4 M/S
LV SEPTAL E/E' RATIO: 7.4 M/S
MV PEAK A VEL: 0.92 M/S
MV PEAK E VEL: 0.74 M/S
NUC REST EJECTION FRACTION: 70
NUC STRESS EJECTION FRACTION: 63 %
OHS CV CPX 85 PERCENT MAX PREDICTED HEART RATE MALE: 131
OHS CV CPX ESTIMATED METS: 1
OHS CV CPX MAX PREDICTED HEART RATE: 154
OHS CV CPX PATIENT IS FEMALE: 0
OHS CV CPX PATIENT IS MALE: 1
OHS CV CPX PEAK DIASTOLIC BLOOD PRESSURE: 66 MMHG
OHS CV CPX PEAK HEAR RATE: 91 BPM
OHS CV CPX PEAK RATE PRESSURE PRODUCT: NORMAL
OHS CV CPX PEAK SYSTOLIC BLOOD PRESSURE: 136 MMHG
OHS CV CPX PERCENT MAX PREDICTED HEART RATE ACHIEVED: 59
OHS CV CPX RATE PRESSURE PRODUCT PRESENTING: NORMAL
PISA TR MAX VEL: 2.73 M/S
RA MAJOR: 4.44 CM
RA PRESSURE: 3 MMHG
RA WIDTH: 2.75 CM
RIGHT VENTRICULAR END-DIASTOLIC DIMENSION: 2.37 CM
SINUS: 3.36 CM
STJ: 3.38 CM
STRESS ECHO POST EXERCISE DUR MIN: 1 MINUTES
STRESS ECHO POST EXERCISE DUR SEC: 1 SECONDS
STRESS ECHO TARGET HR: 131 BPM
SYSTOLIC BLOOD PRESSURE: 129 MMHG
TDI LATERAL: 0.1 M/S
TDI SEPTAL: 0.1 M/S
TDI: 0.1 M/S
TR MAX PG: 30 MMHG
TRICUSPID ANNULAR PLANE SYSTOLIC EXCURSION: 1.78 CM
TV REST PULMONARY ARTERY PRESSURE: 33 MMHG

## 2020-03-17 PROCEDURE — 78452 STRESS TEST WITH MYOCARDIAL PERFUSION (CUPID ONLY): ICD-10-PCS | Mod: 26,,, | Performed by: INTERNAL MEDICINE

## 2020-03-17 PROCEDURE — 93016 CV STRESS TEST SUPVJ ONLY: CPT | Mod: ,,, | Performed by: INTERNAL MEDICINE

## 2020-03-17 PROCEDURE — 78452 HT MUSCLE IMAGE SPECT MULT: CPT | Mod: 26,,, | Performed by: INTERNAL MEDICINE

## 2020-03-17 PROCEDURE — 93017 CV STRESS TEST TRACING ONLY: CPT

## 2020-03-17 PROCEDURE — 93306 TTE W/DOPPLER COMPLETE: CPT

## 2020-03-17 PROCEDURE — 93016 STRESS TEST WITH MYOCARDIAL PERFUSION (CUPID ONLY): ICD-10-PCS | Mod: ,,, | Performed by: INTERNAL MEDICINE

## 2020-03-17 PROCEDURE — 93306 ECHO (CUPID ONLY): ICD-10-PCS | Mod: 26,,, | Performed by: INTERNAL MEDICINE

## 2020-03-17 PROCEDURE — A9502 TC99M TETROFOSMIN: HCPCS

## 2020-03-17 PROCEDURE — 93306 TTE W/DOPPLER COMPLETE: CPT | Mod: 26,,, | Performed by: INTERNAL MEDICINE

## 2020-03-17 PROCEDURE — 93018 STRESS TEST WITH MYOCARDIAL PERFUSION (CUPID ONLY): ICD-10-PCS | Mod: ,,, | Performed by: INTERNAL MEDICINE

## 2020-03-17 PROCEDURE — 93018 CV STRESS TEST I&R ONLY: CPT | Mod: ,,, | Performed by: INTERNAL MEDICINE

## 2020-04-17 ENCOUNTER — NURSE TRIAGE (OUTPATIENT)
Dept: ADMINISTRATIVE | Facility: CLINIC | Age: 65
End: 2020-04-17

## 2020-04-17 ENCOUNTER — HOSPITAL ENCOUNTER (EMERGENCY)
Facility: HOSPITAL | Age: 65
Discharge: HOME | End: 2020-04-17
Attending: EMERGENCY MEDICINE
Payer: MEDICARE

## 2020-04-17 ENCOUNTER — TELEPHONE (OUTPATIENT)
Dept: TRANSPLANT | Facility: CLINIC | Age: 65
End: 2020-04-17

## 2020-04-17 ENCOUNTER — TELEPHONE (OUTPATIENT)
Dept: CARDIOLOGY | Facility: CLINIC | Age: 65
End: 2020-04-17

## 2020-04-17 ENCOUNTER — TELEPHONE (OUTPATIENT)
Dept: GASTROENTEROLOGY | Facility: CLINIC | Age: 65
End: 2020-04-17

## 2020-04-17 DIAGNOSIS — R07.9 CHEST PAIN: ICD-10-CM

## 2020-04-17 DIAGNOSIS — Z71.89 EDUCATED ABOUT COVID-19 VIRUS INFECTION: ICD-10-CM

## 2020-04-17 DIAGNOSIS — R05.9 COUGH: Primary | ICD-10-CM

## 2020-04-17 DIAGNOSIS — R39.198 DIFFICULTY URINATING: ICD-10-CM

## 2020-04-17 LAB
ALBUMIN SERPL BCP-MCNC: 3.9 G/DL (ref 3.5–5.2)
ALP SERPL-CCNC: 70 U/L (ref 55–135)
ALT SERPL W/O P-5'-P-CCNC: 27 U/L (ref 10–44)
ANION GAP SERPL CALC-SCNC: 10 MMOL/L (ref 8–16)
AST SERPL-CCNC: 32 U/L (ref 10–40)
BASOPHILS # BLD AUTO: 0.07 K/UL (ref 0–0.2)
BASOPHILS NFR BLD: 0.9 % (ref 0–1.9)
BILIRUB DIRECT SERPL-MCNC: 0.4 MG/DL (ref 0.1–0.3)
BILIRUB SERPL-MCNC: 0.8 MG/DL (ref 0.1–1)
BNP SERPL-MCNC: 16 PG/ML (ref 0–99)
BUN SERPL-MCNC: 13 MG/DL (ref 8–23)
CALCIUM SERPL-MCNC: 9.5 MG/DL (ref 8.7–10.5)
CHLORIDE SERPL-SCNC: 99 MMOL/L (ref 95–110)
CO2 SERPL-SCNC: 25 MMOL/L (ref 23–29)
CREAT SERPL-MCNC: 1 MG/DL (ref 0.5–1.4)
DIFFERENTIAL METHOD: ABNORMAL
EOSINOPHIL # BLD AUTO: 0.1 K/UL (ref 0–0.5)
EOSINOPHIL NFR BLD: 1 % (ref 0–8)
ERYTHROCYTE [DISTWIDTH] IN BLOOD BY AUTOMATED COUNT: 13.6 % (ref 11.5–14.5)
EST. GFR  (AFRICAN AMERICAN): >60 ML/MIN/1.73 M^2
EST. GFR  (NON AFRICAN AMERICAN): >60 ML/MIN/1.73 M^2
GLUCOSE SERPL-MCNC: 126 MG/DL (ref 70–110)
HCT VFR BLD AUTO: 46.9 % (ref 40–54)
HGB BLD-MCNC: 16.2 G/DL (ref 14–18)
IMM GRANULOCYTES # BLD AUTO: 0.04 K/UL (ref 0–0.04)
IMM GRANULOCYTES NFR BLD AUTO: 0.5 % (ref 0–0.5)
LYMPHOCYTES # BLD AUTO: 1.4 K/UL (ref 1–4.8)
LYMPHOCYTES NFR BLD: 17.7 % (ref 18–48)
MCH RBC QN AUTO: 32.6 PG (ref 27–31)
MCHC RBC AUTO-ENTMCNC: 34.5 G/DL (ref 32–36)
MCV RBC AUTO: 94 FL (ref 82–98)
MONOCYTES # BLD AUTO: 0.8 K/UL (ref 0.3–1)
MONOCYTES NFR BLD: 10.4 % (ref 4–15)
NEUTROPHILS # BLD AUTO: 5.5 K/UL (ref 1.8–7.7)
NEUTROPHILS NFR BLD: 69.5 % (ref 38–73)
NRBC BLD-RTO: 0 /100 WBC
PLATELET # BLD AUTO: 56 K/UL (ref 150–350)
PMV BLD AUTO: 12.1 FL (ref 9.2–12.9)
POTASSIUM SERPL-SCNC: 4.1 MMOL/L (ref 3.5–5.1)
PROT SERPL-MCNC: 7.6 G/DL (ref 6–8.4)
RBC # BLD AUTO: 4.97 M/UL (ref 4.6–6.2)
SARS-COV-2 RDRP RESP QL NAA+PROBE: NEGATIVE
SODIUM SERPL-SCNC: 134 MMOL/L (ref 136–145)
TROPONIN I SERPL DL<=0.01 NG/ML-MCNC: <0.006 NG/ML (ref 0–0.03)
WBC # BLD AUTO: 7.89 K/UL (ref 3.9–12.7)

## 2020-04-17 PROCEDURE — U0002 COVID-19 LAB TEST NON-CDC: HCPCS

## 2020-04-17 PROCEDURE — 80076 HEPATIC FUNCTION PANEL: CPT

## 2020-04-17 PROCEDURE — 93005 ELECTROCARDIOGRAM TRACING: CPT

## 2020-04-17 PROCEDURE — 36415 COLL VENOUS BLD VENIPUNCTURE: CPT

## 2020-04-17 PROCEDURE — 83880 ASSAY OF NATRIURETIC PEPTIDE: CPT

## 2020-04-17 PROCEDURE — 84484 ASSAY OF TROPONIN QUANT: CPT

## 2020-04-17 PROCEDURE — 85025 COMPLETE CBC W/AUTO DIFF WBC: CPT

## 2020-04-17 PROCEDURE — 93010 ELECTROCARDIOGRAM REPORT: CPT | Mod: ,,, | Performed by: INTERNAL MEDICINE

## 2020-04-17 PROCEDURE — 99285 EMERGENCY DEPT VISIT HI MDM: CPT | Mod: 25

## 2020-04-17 PROCEDURE — 36000 PLACE NEEDLE IN VEIN: CPT

## 2020-04-17 PROCEDURE — 93010 EKG 12-LEAD: ICD-10-PCS | Mod: ,,, | Performed by: INTERNAL MEDICINE

## 2020-04-17 PROCEDURE — 80048 BASIC METABOLIC PNL TOTAL CA: CPT

## 2020-04-17 NOTE — TELEPHONE ENCOUNTER
----- Message from Dolly Lewis sent at 4/17/2020  1:13 PM CDT -----  Pt calling because he had been experiencing what he think are Covid-19 Symptoms he stated he had a rash that he has been talking to his derm about but he was to be sure if he needs to go to the hospital due to the symptoms he's experiencing     Pt wants to consult before he just goes to the ED    Pt contact [phone]

## 2020-04-17 NOTE — TELEPHONE ENCOUNTER
Reason for Disposition   Patient sounds very sick or weak to the triager    Additional Information   Negative: SEVERE difficulty breathing (e.g., struggling for each breath, speak in single words, bluish lips)   Negative: Sounds like a life-threatening emergency to the triager   Negative: [1] Adult has symptoms of COVID-19 (fever, cough, or SOB) AND [2] lab test positive   Negative: [1] Adult has symptoms of COVID-19 (fever, cough or SOB) AND [2] major community spread where patient lives AND [3] testing not being done for mild symptoms   Negative: [1] Difficulty breathing (shortness of breath) occurs AND [2] onset > 14 days after COVID-19 EXPOSURE (Close Contact) AND [3] no major community spread   Negative: [1] Cough occurs AND [2] onset > 14 days after COVID-19 EXPOSURE AND [3] no major community spread   Negative: [1] Common cold symptoms AND [2] onset > 14 days after COVID-19 EXPOSURE AND [3] no major community spread   Negative: [1] Difficulty breathing occurs AND [2] within 14 days of COVID-19 EXPOSURE (Close Contact)    Protocols used: CORONAVIRUS (COVID-19) EXPOSURE-A-[place]  Liver tx 12/2000  BPA n/a  CC: been out shopping (wearing a mask) unsure about covid 19 exposure- woke with cold sweat this am. eyes blood red. Rash on eyebrows, chin, behind leg. Bumps in head. Skinned leg bad. Hx DM, didnt sleep very well last pm. had some urinary retention- passed urine after pressed down hard on bladder. Pt admits to being exhausted. Rec ED.pt states he spoke with family doctor that rec ED or 911. Pt states he is going to call 911. Call back with questions   Attempted to call report, unable to receive at this time.

## 2020-04-17 NOTE — ED PROVIDER NOTES
SCRIBE #1 NOTE: I, Kendell Becerra, am scribing for, and in the presence of, Juaquin Tena MD. I have scribed the entire note.      History      Chief Complaint   Patient presents with    Cough     pt c/o cough and sore throat, pt states that he is a transplant pt and is worried about getting the COVID virus, pt states that he is also having trouble urinating and was recently dx with genital warts     Review of patient's allergies indicates:   Allergen Reactions    Codeine      Other reaction(s): Unknown    Darvocet a500 [propoxyphene n-acetaminophen]     Paroxetine hcl      Other reaction(s): Unknown    Penicillins      Other reaction(s): Anaphylaxis      HPI   HPI    4/17/2020, 4:09 PM   History obtained from the patient      History of Present Illness: Chapo Jacobo is a 64 y.o. male patient with a PMHx of DM, herpes, aortic aneurysm, and a SHx of Liver Txp on 12/29/2000 at [facility] who presents to the Emergency Department for concern about COVID-19. He says he is a transplant patient and wants to make sure that he doesn't have it.  He reports a cough x1 week.  He states he woke up this morning in a sweat.  He reports he had chest pain once last night.  Any reports dyspnea on exertion.  He also reports difficulty urinating this morning.  Reports compliance with flomax.  Denies fevers, headache, myalgias, dysuria, abdominal pain, diarrhea, vomiting, diaphoresis with his upset her chest pain, radiation of the chest pain, and all other symptoms.  No prior evaluation.    Arrival mode: Personal vehicle    PCP: Ismael Jackson MD     Past Medical History:  Past Medical History:   Diagnosis Date    Anxiety     Appendicitis     Depression     Diabetes mellitus     Encounter for blood transfusion     Gallbladder & bile duct stone with obstruction     Herpes     Sleep apnea      Past Surgical History:  Past Surgical History:   Procedure Laterality Date    ABDOMINAL SURGERY      APPENDECTOMY       bilateral rotator cuff surgery      BRAIN SURGERY      CERVICAL FUSION      CHOLECYSTECTOMY      COLONOSCOPY N/A 7/10/2017    Procedure: COLONOSCOPY;  Surgeon: Viviana Reyes MD;  Location: [facility];  Service: Endoscopy;  Laterality: N/A;    LIVER TRANSPLANT      LUMBAR DISC SURGERY      TONSILLECTOMY         Family History:  Family History   Problem Relation Age of Onset    Arthritis Mother     Melanoma Father     Cancer Father         melanoma    Fibromyalgia Sister     Cancer Maternal Grandfather         unknown type     Social History:  Social History     Tobacco Use    Smoking status: Current Every Day Smoker     Packs/day: 1.00     Years: 46.00     Pack years: 46.00     Types: Cigarettes    Smokeless tobacco: Never Used   Substance and Sexual Activity    Alcohol use: Yes     Alcohol/week: 2.0 standard drinks     Types: 2 Cans of beer per week    Drug use: No    Sexual activity: Yes     Partners: Female     ROS   Review of Systems   Constitutional: Positive for diaphoresis (night sweats). Negative for chills and fever.   HENT: Negative for sore throat.    Respiratory: Positive for cough and shortness of breath (exertional).    Cardiovascular: Positive for chest pain (episode last night).   Gastrointestinal: Negative for diarrhea, nausea and vomiting.   Genitourinary: Positive for difficulty urinating. Negative for dysuria.   Musculoskeletal: Negative for back pain.   Skin: Negative for rash.   Neurological: Negative for dizziness, seizures, weakness, light-headedness, numbness and headaches.   Hematological: Does not bruise/bleed easily.   All other systems reviewed and are negative.    Physical Exam      Initial Vitals [04/17/20 1553]   BP Pulse Resp Temp SpO2   121/72 88 20 98.7 °F (37.1 °C) (!) 93 %      MAP       --          Physical Exam  Nursing Notes and Vital Signs Reviewed.  Constitutional: Patient is in no acute distress. Well-developed and well-nourished.  Head: Atraumatic.  "Normocephalic.  Eyes: PERRL. EOM intact. Conjunctivae are not pale. No scleral icterus.  ENT: Mucous membranes are moist. Oropharynx is clear and symmetric.    Neck: Supple. Full ROM. No lymphadenopathy.  Cardiovascular: Regular rate. Regular rhythm. No murmurs, rubs, or gallops. Distal pulses are 2+ and symmetric.  Pulmonary/Chest: No respiratory distress. Clear to auscultation bilaterally. No wheezing or rales.  Abdominal: Soft and non-distended.  There is no tenderness.  No rebound, guarding, or rigidity.   : PVR 40 ml  Musculoskeletal: Moves all extremities. No obvious deformities. No edema.  Skin: Warm and dry.  Neurological:  Alert, awake, and appropriate.  Normal speech.  No acute focal neurological deficits are appreciated.  Psychiatric: Normal affect. Good eye contact. Appropriate in content.    ED Course    Procedures  ED Vital Signs:  Vitals:    04/17/20 1553 04/17/20 1631 04/17/20 1702 04/17/20 1731   BP: 121/72 109/67 101/66 106/66   Pulse: 88 81 84 81   Resp: 20   20   Temp: 98.7 °F (37.1 °C)      TempSrc: Oral      SpO2: (!) 93% 96% 95% (!) 92%   Weight: 104.3 kg (230 lb)      Height: 5' 10" (1.778 m)          Abnormal Lab Results:  Labs Reviewed   BASIC METABOLIC PANEL - Abnormal; Notable for the following components:       Result Value    Sodium 134 (*)     Glucose 126 (*)     All other components within normal limits   HEPATIC FUNCTION PANEL - Abnormal; Notable for the following components:    Bilirubin, Direct 0.4 (*)     All other components within normal limits   CBC W/ AUTO DIFFERENTIAL - Abnormal; Notable for the following components:    Mean Corpuscular Hemoglobin 32.6 (*)     Platelets 56 (*)     Lymph% 17.7 (*)     All other components within normal limits   TROPONIN I   SARS-COV-2 RNA AMPLIFICATION, QUAL    Narrative:     What symptom criteria does the patient meet?->Cough   B-TYPE NATRIURETIC PEPTIDE        All Lab Results:  Results for orders placed or performed during the hospital " encounter of 04/17/20   Troponin I   Result Value Ref Range    Troponin I <0.006 0.000 - 0.026 ng/mL   Basic metabolic panel   Result Value Ref Range    Sodium 134 (L) 136 - 145 mmol/L    Potassium 4.1 3.5 - 5.1 mmol/L    Chloride 99 95 - 110 mmol/L    CO2 25 23 - 29 mmol/L    Glucose 126 (H) 70 - 110 mg/dL    BUN, Bld 13 8 - 23 mg/dL    Creatinine 1.0 0.5 - 1.4 mg/dL    Calcium 9.5 8.7 - 10.5 mg/dL    Anion Gap 10 8 - 16 mmol/L    eGFR if African American >60 >60 mL/min/1.73 m^2    eGFR if non African American >60 >60 mL/min/1.73 m^2   Hepatic function panel   Result Value Ref Range    Total Protein 7.6 6.0 - 8.4 g/dL    Albumin 3.9 3.5 - 5.2 g/dL    Total Bilirubin 0.8 0.1 - 1.0 mg/dL    Bilirubin, Direct 0.4 (H) 0.1 - 0.3 mg/dL    AST 32 10 - 40 U/L    ALT 27 10 - 44 U/L    Alkaline Phosphatase 70 55 - 135 U/L   COVID-19 Rapid Screening   Result Value Ref Range    SARS-CoV-2 RNA, Amplification, Qual Negative Negative   Brain natriuretic peptide   Result Value Ref Range    BNP 16 0 - 99 pg/mL   CBC auto differential   Result Value Ref Range    WBC 7.89 3.90 - 12.70 K/uL    RBC 4.97 4.60 - 6.20 M/uL    Hemoglobin 16.2 14.0 - 18.0 g/dL    Hematocrit 46.9 40.0 - 54.0 %    Mean Corpuscular Volume 94 82 - 98 fL    Mean Corpuscular Hemoglobin 32.6 (H) 27.0 - 31.0 pg    Mean Corpuscular Hemoglobin Conc 34.5 32.0 - 36.0 g/dL    RDW 13.6 11.5 - 14.5 %    Platelets 56 (L) 150 - 350 K/uL    MPV 12.1 9.2 - 12.9 fL    Immature Granulocytes 0.5 0.0 - 0.5 %    Gran # (ANC) 5.5 1.8 - 7.7 K/uL    Immature Grans (Abs) 0.04 0.00 - 0.04 K/uL    Lymph # 1.4 1.0 - 4.8 K/uL    Mono # 0.8 0.3 - 1.0 K/uL    Eos # 0.1 0.0 - 0.5 K/uL    Baso # 0.07 0.00 - 0.20 K/uL    nRBC 0 0 /100 WBC    Gran% 69.5 38.0 - 73.0 %    Lymph% 17.7 (L) 18.0 - 48.0 %    Mono% 10.4 4.0 - 15.0 %    Eosinophil% 1.0 0.0 - 8.0 %    Basophil% 0.9 0.0 - 1.9 %    Differential Method Automated      Imaging Results:  Imaging Results          X-Ray Chest AP Portable (Final  result)  Result time 04/17/20 17:07:46    Final result by Alphonso Morris MD (04/17/20 17:07:46)                 Impression:      No acute findings.      Electronically signed by: Alphonso Morris MD  Date:    04/17/2020  Time:    17:07             Narrative:    EXAMINATION:  XR CHEST AP PORTABLE    CLINICAL HISTORY:  dyspnea on exertion;    TECHNIQUE:  Single frontal view of the chest was performed.    COMPARISON:  01/17/2015    FINDINGS:  The cardiomediastinal silhouette is normal.    Diffuse coarsening of the interstitium appears stable.  Negative for acute infiltrate or effusion.    Lower cervical fusion.                               The EKG was ordered, reviewed, and independently interpreted by the ED provider.  Interpretation time: 16:32  Normal sinus rhythm, rate 81, right bundle-branch block, no STEMI             The Emergency Provider reviewed the vital signs and test results, which are outlined above.    ED Discussion     6:15 PM: Reassessed pt at this time. Discussed with pt all pertinent ED information and results. Discussed pt dx and plan of tx. Gave pt all f/u and return to the ED instructions. All questions and concerns were addressed at this time. Pt expresses understanding of information and instructions, and is comfortable with plan to discharge. Pt is stable for discharge.    I discussed with patient and/or family/caretaker that evaluation in the ED does not suggest any emergent or life threatening medical conditions requiring immediate intervention beyond what was provided in the ED, and I believe patient is safe for discharge.  Regardless, an unremarkable evaluation in the ED does not preclude the development or presence of a serious of life threatening condition. As such, patient was instructed to return immediately for any worsening or change in current symptoms.         ED Medication(s):  Medications - No data to display    Follow-up Information     Call  Ismael Jackson MD.    Specialty:  Internal  Medicine  Contact information:  [address]  [facility] [address]  [phone]             [facility] - .    Specialty:  Emergency Medicine  Why:  As needed, If symptoms worsen  Contact information:  [address]  [facility] [address] [phone]                New Prescriptions    No medications on file         Medical Decision Making    Medical Decision Making:   Clinical Tests:   Lab Tests: Ordered and Reviewed  Radiological Study: Ordered and Reviewed  Medical Tests: Ordered and Reviewed  64-year-old male; nontoxic-appearing; concerned about possible Coronavirus; COVID-19 testing negative; patient stable for discharge with outpatient follow-up; ER return precautions provided           Scribe Attestation:   Scribe #1: I performed the above scribed service and the documentation accurately describes the services I performed. I attest to the accuracy of the note.    Attending:   Physician Attestation Statement for Scribe #1: I, Juaquin Tena MD, personally performed the services described in this documentation, as scribed by Kendell Becerra, in my presence, and it is both accurate and complete.          Clinical Impression       ICD-10-CM ICD-9-CM   1. Cough R05 786.2   2. Chest pain R07.9 786.50   3. Difficulty urinating R39.198 788.99   4. Educated About Covid-19 Virus Infection Z71.89        Disposition:   Disposition: Discharged  Condition: Stable         Juaquin Tena MD  04/17/20 1781

## 2020-04-17 NOTE — ED TRIAGE NOTES
"Arrives to ER complaining of SOB with exertion and cough, states "I woke up last night in a cold sweat and while walking to the mail box I got SOB." Pt. Reports intermittent CP to left side, reports taking nitro last night had relief after 10 minutes. Room air saturation 93% -n/v/d, pt. Is liver transplant pt. Reports recent follow up with cardiologist and told he would have to have surgery. VSS will continue to monitor.   "

## 2020-04-17 NOTE — TELEPHONE ENCOUNTER
----- Message from Karen Vinson sent at 4/17/2020  1:48 PM CDT -----  Contact: pt  The pt request a return call, the pt state's it's urgent but no additional info given and can be reached at [phone]///[id number]

## 2020-04-18 VITALS
HEIGHT: 70 IN | OXYGEN SATURATION: 95 % | TEMPERATURE: 99 F | HEART RATE: 78 BPM | RESPIRATION RATE: 16 BRPM | BODY MASS INDEX: 32.93 KG/M2 | WEIGHT: 230 LBS | DIASTOLIC BLOOD PRESSURE: 65 MMHG | SYSTOLIC BLOOD PRESSURE: 109 MMHG

## 2020-05-20 ENCOUNTER — TELEPHONE (OUTPATIENT)
Dept: GASTROENTEROLOGY | Facility: CLINIC | Age: 65
End: 2020-05-20

## 2020-05-20 NOTE — TELEPHONE ENCOUNTER
----- Message from Anabela Trent sent at 5/19/2020  4:46 PM CDT -----  Contact: PT   PT called to reschedule the labs he missed on 5/4. Please call back     Callback: [phone]

## 2020-05-20 NOTE — TELEPHONE ENCOUNTER
Attempted to contact patient at [phone] with no answer. Left voicemail message for patient to return call.

## 2020-05-20 NOTE — TELEPHONE ENCOUNTER
Attempted to return call to patient at [phone] with no answer. Left voicemail message for patient to return call.

## 2020-05-20 NOTE — TELEPHONE ENCOUNTER
Attempted to return call to patient at [phone] with no answer. Left voicemail message for patient to return call.

## 2020-05-20 NOTE — TELEPHONE ENCOUNTER
----- Message from Nestor Strak sent at 5/20/2020 12:08 PM CDT -----  Contact: pt  Type:  Sooner Apoointment Request    Caller is requesting a sooner appointment.  Caller declined first available appointment listed below.  Caller will not accept being placed on the waitlist and is requesting a message be sent to doctor.  Name of Caller: SEMAJ BURDEN  When is the first available appointment? 08/31,2020  Symptoms: surgery clearance  Would the patient rather a call back or a response via [facility]?  call  Best Call Back Number: [phone] (home)   Additional Information:

## 2020-05-20 NOTE — TELEPHONE ENCOUNTER
----- Message from Natasha Sarabia sent at 5/19/2020  4:19 PM CDT -----  Contact: self  Type:  Sooner Apoointment Request    Caller is requesting a sooner appointment.  Caller declined first available appointment listed below.  Caller will not accept being placed on the waitlist and is requesting a message be sent to doctor.  Name of Caller: pt  When is the first available appointment? n/a  Symptoms: 4.3 blockage  Would the patient rather a call back or a response via [facility]?  Call back  Best Call Back Number: [phone]  Additional Information: Please call back. Thanks.

## 2020-05-21 ENCOUNTER — TELEPHONE (OUTPATIENT)
Dept: GASTROENTEROLOGY | Facility: CLINIC | Age: 65
End: 2020-05-21

## 2020-05-21 NOTE — TELEPHONE ENCOUNTER
----- Message from Renée Haynes sent at 5/21/2020  1:41 PM CDT -----  Contact: Pt  Type:  Patient Returning Call    Who Called:PT  Who Left Message for Patient:FLORENTIN  Does the patient know what this is regarding?:YES  Would the patient rather a call back or a response via [facility]? CALL BACK  Best Call Back Number:[phone]  Additional Information:

## 2020-05-21 NOTE — TELEPHONE ENCOUNTER
Attempted to contact patient at [phone] with no answer. Left voicemail message for patient to return call.

## 2020-06-16 ENCOUNTER — TELEPHONE (OUTPATIENT)
Dept: TRANSPLANT | Facility: CLINIC | Age: 65
End: 2020-06-16

## 2020-06-17 ENCOUNTER — TELEPHONE (OUTPATIENT)
Dept: TRANSPLANT | Facility: CLINIC | Age: 65
End: 2020-06-17

## 2020-06-22 ENCOUNTER — TELEPHONE (OUTPATIENT)
Dept: TRANSPLANT | Facility: CLINIC | Age: 65
End: 2020-06-22

## 2020-06-22 NOTE — LETTER
June 22, 2020    Chapo Hammondsis  [address] [facility] [address]          Dear Chapo Jacobo:  MRN: [id number]    Your lab work was due to be drawn on 5/4/2020.  We contacted your lab and were unable to get test results.  It is very important to get your labs drawn as scheduled.  We cannot monitor you for rejection, infections, or drug toxicity side effects without lab results.  Please call Racheal LUTZ  at [phone] as soon as possible to let us know when you plan to have labs drawn. Second letter mailed. Please contact us as soon as possible to schedule your labs.    Sincerely,      IRVING BensonN, Rn, CCTC  Your Liver Transplant Coordinator    [facility]  [address] [place] [address] [phone]

## 2020-06-22 NOTE — TELEPHONE ENCOUNTER
Noted that pt was rescheduled for labs 6/8 due to missing scheduled labs on 5/4, and he missed that appt. First missed lab letter mailed 5/12/20 with no patient response.  Second letter mailed today 6/22.

## 2020-07-02 ENCOUNTER — TELEPHONE (OUTPATIENT)
Dept: TRANSPLANT | Facility: CLINIC | Age: 65
End: 2020-07-02

## 2020-07-06 ENCOUNTER — TELEPHONE (OUTPATIENT)
Dept: TRANSPLANT | Facility: CLINIC | Age: 65
End: 2020-07-06

## 2020-07-14 ENCOUNTER — LAB VISIT (OUTPATIENT)
Dept: LAB | Facility: HOSPITAL | Age: 65
End: 2020-07-14
Attending: INTERNAL MEDICINE
Payer: MEDICARE

## 2020-07-14 DIAGNOSIS — K74.60 CIRRHOSIS OF LIVER WITHOUT ASCITES, UNSPECIFIED HEPATIC CIRRHOSIS TYPE: ICD-10-CM

## 2020-07-14 DIAGNOSIS — Z94.4 LIVER TRANSPLANTED: ICD-10-CM

## 2020-07-14 LAB
AFP SERPL-MCNC: 2.5 NG/ML (ref 0–8.4)
ALBUMIN SERPL BCP-MCNC: 3.8 G/DL (ref 3.5–5.2)
ALP SERPL-CCNC: 71 U/L (ref 55–135)
ALT SERPL W/O P-5'-P-CCNC: 18 U/L (ref 10–44)
ANION GAP SERPL CALC-SCNC: 7 MMOL/L (ref 8–16)
AST SERPL-CCNC: 21 U/L (ref 10–40)
BASOPHILS # BLD AUTO: 0.09 K/UL (ref 0–0.2)
BASOPHILS NFR BLD: 1.5 % (ref 0–1.9)
BILIRUB SERPL-MCNC: 0.5 MG/DL (ref 0.1–1)
BUN SERPL-MCNC: 9 MG/DL (ref 8–23)
CALCIUM SERPL-MCNC: 9.4 MG/DL (ref 8.7–10.5)
CHLORIDE SERPL-SCNC: 99 MMOL/L (ref 95–110)
CO2 SERPL-SCNC: 31 MMOL/L (ref 23–29)
CREAT SERPL-MCNC: 1.1 MG/DL (ref 0.5–1.4)
DIFFERENTIAL METHOD: ABNORMAL
EOSINOPHIL # BLD AUTO: 0.2 K/UL (ref 0–0.5)
EOSINOPHIL NFR BLD: 3.3 % (ref 0–8)
ERYTHROCYTE [DISTWIDTH] IN BLOOD BY AUTOMATED COUNT: 12.9 % (ref 11.5–14.5)
EST. GFR  (AFRICAN AMERICAN): >60 ML/MIN/1.73 M^2
EST. GFR  (NON AFRICAN AMERICAN): >60 ML/MIN/1.73 M^2
GLUCOSE SERPL-MCNC: 148 MG/DL (ref 70–110)
HCT VFR BLD AUTO: 52.7 % (ref 40–54)
HGB BLD-MCNC: 17.4 G/DL (ref 14–18)
IMM GRANULOCYTES # BLD AUTO: 0.03 K/UL (ref 0–0.04)
IMM GRANULOCYTES NFR BLD AUTO: 0.5 % (ref 0–0.5)
INR PPP: 1 (ref 0.8–1.2)
LYMPHOCYTES # BLD AUTO: 1.5 K/UL (ref 1–4.8)
LYMPHOCYTES NFR BLD: 24.8 % (ref 18–48)
MCH RBC QN AUTO: 32 PG (ref 27–31)
MCHC RBC AUTO-ENTMCNC: 33 G/DL (ref 32–36)
MCV RBC AUTO: 97 FL (ref 82–98)
MONOCYTES # BLD AUTO: 0.6 K/UL (ref 0.3–1)
MONOCYTES NFR BLD: 9.4 % (ref 4–15)
NEUTROPHILS # BLD AUTO: 3.7 K/UL (ref 1.8–7.7)
NEUTROPHILS NFR BLD: 60.5 % (ref 38–73)
NRBC BLD-RTO: 0 /100 WBC
PLATELET # BLD AUTO: 142 K/UL (ref 150–350)
PMV BLD AUTO: 12.7 FL (ref 9.2–12.9)
POTASSIUM SERPL-SCNC: 4.3 MMOL/L (ref 3.5–5.1)
PROT SERPL-MCNC: 7.7 G/DL (ref 6–8.4)
PROTHROMBIN TIME: 10.4 SEC (ref 9–12.5)
RBC # BLD AUTO: 5.44 M/UL (ref 4.6–6.2)
SODIUM SERPL-SCNC: 137 MMOL/L (ref 136–145)
WBC # BLD AUTO: 6.05 K/UL (ref 3.9–12.7)

## 2020-07-14 PROCEDURE — 80197 ASSAY OF TACROLIMUS: CPT

## 2020-07-14 PROCEDURE — 85025 COMPLETE CBC W/AUTO DIFF WBC: CPT

## 2020-07-14 PROCEDURE — 85610 PROTHROMBIN TIME: CPT

## 2020-07-14 PROCEDURE — 36415 COLL VENOUS BLD VENIPUNCTURE: CPT | Mod: PO

## 2020-07-14 PROCEDURE — 82105 ALPHA-FETOPROTEIN SERUM: CPT

## 2020-07-14 PROCEDURE — 80053 COMPREHEN METABOLIC PANEL: CPT

## 2020-07-15 LAB — TACROLIMUS BLD-MCNC: <1.5 NG/ML (ref 5–15)

## 2020-07-16 ENCOUNTER — TELEPHONE (OUTPATIENT)
Dept: TRANSPLANT | Facility: CLINIC | Age: 65
End: 2020-07-16

## 2020-07-16 NOTE — TELEPHONE ENCOUNTER
Called Mr Jacobo and reviewed labs and very low tacro level. He admitted he has missed a few doses. Coord reminded him the importance of not missing any doses and  Labs 12 hrs after night dose. He stated that he promised he would take medication correctly. Labs next Monday, 7/20.    ----- Message from Viviana Reyes MD sent at 7/16/2020  3:53 PM CDT -----  Tacrolimus level is undetectable although liver tests are okay.  Need to make sure patient is not miss doses.  Repeat labs next week.

## 2020-07-24 ENCOUNTER — TELEPHONE (OUTPATIENT)
Dept: TRANSPLANT | Facility: CLINIC | Age: 65
End: 2020-07-24

## 2020-07-24 NOTE — TELEPHONE ENCOUNTER
Returned call to pt and phone went to . Asked for a return call.----- Message from Eugenio Schwartz sent at 7/24/2020 11:49 AM CDT -----  Regarding: Speak with office  Contact: Chapo Dumont is calling to speak with nurse.      [phone]

## 2020-07-27 ENCOUNTER — TELEPHONE (OUTPATIENT)
Dept: TRANSPLANT | Facility: CLINIC | Age: 65
End: 2020-07-27

## 2020-07-27 NOTE — TELEPHONE ENCOUNTER
Called Mr Jacobo to see how he was doing. He let me know his mom passed away last week with a very recent dx of stage 4 ovarian cancer. We will reschedule his labs and Dr Eric plata for middle of August.

## 2020-08-05 ENCOUNTER — TELEPHONE (OUTPATIENT)
Dept: CARDIOLOGY | Facility: CLINIC | Age: 65
End: 2020-08-05

## 2020-08-05 ENCOUNTER — TELEPHONE (OUTPATIENT)
Dept: TRANSPLANT | Facility: CLINIC | Age: 65
End: 2020-08-05

## 2020-08-05 DIAGNOSIS — R07.89 ATYPICAL CHEST PAIN: Primary | ICD-10-CM

## 2020-08-05 NOTE — TELEPHONE ENCOUNTER
Returned call to pt got VM, left ms for pt to return call if needed.----- Message from Michelet Cruz sent at 8/5/2020  2:16 PM CDT -----  Pt calling to speak with [facility]      [phone]

## 2020-08-05 NOTE — TELEPHONE ENCOUNTER
Pt is requesting nitro, but it is not in his medication list. I attempted to contact the patient to find out why he needs the medication. I left a voicemail for him to call back.

## 2020-08-05 NOTE — TELEPHONE ENCOUNTER
----- Message from Bud Oneill sent at 8/5/2020  3:34 PM CDT -----  Contact: self  Type:  RX Refill Request    Who Called: Chapo Jacobo   Refill or New Rx:refill  RX Name and Strength:nitroglycerin  How is the patient currently taking it? (ex. 1XDay):as needed  Is this a 30 day or 90 day RX:30  Preferred Pharmacy with phone number:  [facility] - [address] [address]  Phone: [phone] Fax: [phone]  Local or Mail Order:local  Ordering Provider:Antwan  Would the patient rather a call back or a response via [facility]? Call back  Best Call Back Number:[phone]  Additional Information:

## 2020-08-06 RX ORDER — NITROGLYCERIN 0.4 MG/1
0.4 TABLET SUBLINGUAL EVERY 5 MIN PRN
Qty: 30 TABLET | Refills: 12 | Status: SHIPPED | OUTPATIENT
Start: 2020-08-06 | End: 2023-07-07

## 2020-08-24 ENCOUNTER — TELEPHONE (OUTPATIENT)
Dept: TRANSPLANT | Facility: CLINIC | Age: 65
End: 2020-08-24

## 2020-08-25 ENCOUNTER — TELEPHONE (OUTPATIENT)
Dept: TRANSPLANT | Facility: CLINIC | Age: 65
End: 2020-08-25

## 2020-08-26 ENCOUNTER — TELEPHONE (OUTPATIENT)
Dept: TRANSPLANT | Facility: CLINIC | Age: 65
End: 2020-08-26

## 2020-09-01 ENCOUNTER — LAB VISIT (OUTPATIENT)
Dept: LAB | Facility: HOSPITAL | Age: 65
End: 2020-09-01
Attending: INTERNAL MEDICINE
Payer: MEDICARE

## 2020-09-01 DIAGNOSIS — Z94.4 LIVER TRANSPLANTED: ICD-10-CM

## 2020-09-01 LAB
ALBUMIN SERPL BCP-MCNC: 3.9 G/DL (ref 3.5–5.2)
ALP SERPL-CCNC: 72 U/L (ref 55–135)
ALT SERPL W/O P-5'-P-CCNC: 19 U/L (ref 10–44)
ANION GAP SERPL CALC-SCNC: 6 MMOL/L (ref 8–16)
AST SERPL-CCNC: 24 U/L (ref 10–40)
BILIRUB SERPL-MCNC: 0.5 MG/DL (ref 0.1–1)
BUN SERPL-MCNC: 14 MG/DL (ref 8–23)
CALCIUM SERPL-MCNC: 9 MG/DL (ref 8.7–10.5)
CHLORIDE SERPL-SCNC: 101 MMOL/L (ref 95–110)
CO2 SERPL-SCNC: 30 MMOL/L (ref 23–29)
CREAT SERPL-MCNC: 1.2 MG/DL (ref 0.5–1.4)
EST. GFR  (AFRICAN AMERICAN): >60 ML/MIN/1.73 M^2
EST. GFR  (NON AFRICAN AMERICAN): >60 ML/MIN/1.73 M^2
GLUCOSE SERPL-MCNC: 165 MG/DL (ref 70–110)
POTASSIUM SERPL-SCNC: 4.5 MMOL/L (ref 3.5–5.1)
PROT SERPL-MCNC: 7.5 G/DL (ref 6–8.4)
SODIUM SERPL-SCNC: 137 MMOL/L (ref 136–145)

## 2020-09-01 PROCEDURE — 36415 COLL VENOUS BLD VENIPUNCTURE: CPT | Mod: PO

## 2020-09-01 PROCEDURE — 80053 COMPREHEN METABOLIC PANEL: CPT

## 2020-09-01 PROCEDURE — 85025 COMPLETE CBC W/AUTO DIFF WBC: CPT

## 2020-09-01 PROCEDURE — 80197 ASSAY OF TACROLIMUS: CPT

## 2020-09-02 LAB
BASOPHILS # BLD AUTO: 0.08 K/UL (ref 0–0.2)
BASOPHILS NFR BLD: 1.3 % (ref 0–1.9)
DIFFERENTIAL METHOD: ABNORMAL
EOSINOPHIL # BLD AUTO: 0.2 K/UL (ref 0–0.5)
EOSINOPHIL NFR BLD: 2.8 % (ref 0–8)
ERYTHROCYTE [DISTWIDTH] IN BLOOD BY AUTOMATED COUNT: 13.5 % (ref 11.5–14.5)
HCT VFR BLD AUTO: 49.2 % (ref 40–54)
HGB BLD-MCNC: 17.1 G/DL (ref 14–18)
IMM GRANULOCYTES # BLD AUTO: 0.04 K/UL (ref 0–0.04)
IMM GRANULOCYTES NFR BLD AUTO: 0.7 % (ref 0–0.5)
LYMPHOCYTES # BLD AUTO: 1.6 K/UL (ref 1–4.8)
LYMPHOCYTES NFR BLD: 27 % (ref 18–48)
MCH RBC QN AUTO: 33.2 PG (ref 27–31)
MCHC RBC AUTO-ENTMCNC: 34.8 G/DL (ref 32–36)
MCV RBC AUTO: 96 FL (ref 82–98)
MONOCYTES # BLD AUTO: 0.6 K/UL (ref 0.3–1)
MONOCYTES NFR BLD: 10.7 % (ref 4–15)
NEUTROPHILS # BLD AUTO: 3.5 K/UL (ref 1.8–7.7)
NEUTROPHILS NFR BLD: 57.5 % (ref 38–73)
NRBC BLD-RTO: 0 /100 WBC
PLATELET # BLD AUTO: 218 K/UL (ref 150–350)
PMV BLD AUTO: 12.8 FL (ref 9.2–12.9)
RBC # BLD AUTO: 5.15 M/UL (ref 4.6–6.2)
TACROLIMUS BLD-MCNC: 5.3 NG/ML (ref 5–15)
WBC # BLD AUTO: 6 K/UL (ref 3.9–12.7)

## 2020-09-03 ENCOUNTER — TELEPHONE (OUTPATIENT)
Dept: TRANSPLANT | Facility: CLINIC | Age: 65
End: 2020-09-03

## 2020-09-03 DIAGNOSIS — Z94.4 LIVER TRANSPLANTED: Primary | ICD-10-CM

## 2020-09-03 NOTE — TELEPHONE ENCOUNTER
Labs reviewed and stable. No med changes. Next labs 12/14/20. Letter mailed to pt.----- Message from Viviana Reyes MD sent at 9/2/2020  5:50 PM CDT -----  Reviewed, nothing to do; repeat per routine

## 2020-09-03 NOTE — LETTER
September 3, 2020    Chaporin FaganOdalis  [address] [facility] [address]          Dear Chapo Jacobo:  MRN: [id number]    This is a follow up to your recent labs, your lab results were stable.  There are no medicine changes.  Please have your labs drawn again on 12/14/20.      If you cannot have your labs drawn on the scheduled date, it is your responsibility to call the transplant department to reschedule.  Please call [phone] and ask to speak to Racheal FLOWER -  for all scheduling requests.     Sincerely,      Racheal Choudhary, IRVINGN, RN ,CCTC  Your Liver Transplant Coordinator    [facility]  [address] [place] [address] [phone]

## 2021-01-05 ENCOUNTER — TELEPHONE (OUTPATIENT)
Dept: TRANSPLANT | Facility: CLINIC | Age: 66
End: 2021-01-05

## 2021-01-12 ENCOUNTER — TELEPHONE (OUTPATIENT)
Dept: TRANSPLANT | Facility: CLINIC | Age: 66
End: 2021-01-12

## 2021-01-13 ENCOUNTER — TELEPHONE (OUTPATIENT)
Dept: TRANSPLANT | Facility: CLINIC | Age: 66
End: 2021-01-13

## 2021-01-14 ENCOUNTER — TELEPHONE (OUTPATIENT)
Dept: TRANSPLANT | Facility: CLINIC | Age: 66
End: 2021-01-14

## 2021-01-15 ENCOUNTER — TELEPHONE (OUTPATIENT)
Dept: TRANSPLANT | Facility: CLINIC | Age: 66
End: 2021-01-15

## 2021-02-10 ENCOUNTER — TELEPHONE (OUTPATIENT)
Dept: TRANSPLANT | Facility: CLINIC | Age: 66
End: 2021-02-10

## 2021-02-15 ENCOUNTER — TELEPHONE (OUTPATIENT)
Dept: TRANSPLANT | Facility: CLINIC | Age: 66
End: 2021-02-15

## 2021-02-22 ENCOUNTER — TELEPHONE (OUTPATIENT)
Dept: TRANSPLANT | Facility: CLINIC | Age: 66
End: 2021-02-22

## 2021-02-22 ENCOUNTER — LAB VISIT (OUTPATIENT)
Dept: LAB | Facility: HOSPITAL | Age: 66
End: 2021-02-22
Attending: INTERNAL MEDICINE
Payer: MEDICARE

## 2021-02-22 DIAGNOSIS — Z94.4 LIVER TRANSPLANTED: ICD-10-CM

## 2021-02-22 LAB
BASOPHILS # BLD AUTO: 0.09 K/UL (ref 0–0.2)
BASOPHILS NFR BLD: 1.5 % (ref 0–1.9)
DIFFERENTIAL METHOD: ABNORMAL
EOSINOPHIL # BLD AUTO: 0.3 K/UL (ref 0–0.5)
EOSINOPHIL NFR BLD: 4.2 % (ref 0–8)
ERYTHROCYTE [DISTWIDTH] IN BLOOD BY AUTOMATED COUNT: 12.5 % (ref 11.5–14.5)
HCT VFR BLD AUTO: 48.5 % (ref 40–54)
HGB BLD-MCNC: 16.9 G/DL (ref 14–18)
IMM GRANULOCYTES # BLD AUTO: 0.04 K/UL (ref 0–0.04)
IMM GRANULOCYTES NFR BLD AUTO: 0.7 % (ref 0–0.5)
INR PPP: 1 (ref 0.8–1.2)
LYMPHOCYTES # BLD AUTO: 1.6 K/UL (ref 1–4.8)
LYMPHOCYTES NFR BLD: 27.4 % (ref 18–48)
MCH RBC QN AUTO: 32.8 PG (ref 27–31)
MCHC RBC AUTO-ENTMCNC: 34.8 G/DL (ref 32–36)
MCV RBC AUTO: 94 FL (ref 82–98)
MONOCYTES # BLD AUTO: 0.6 K/UL (ref 0.3–1)
MONOCYTES NFR BLD: 9.8 % (ref 4–15)
NEUTROPHILS # BLD AUTO: 3.3 K/UL (ref 1.8–7.7)
NEUTROPHILS NFR BLD: 56.4 % (ref 38–73)
NRBC BLD-RTO: 0 /100 WBC
PLATELET # BLD AUTO: 176 K/UL (ref 150–350)
PMV BLD AUTO: 13 FL (ref 9.2–12.9)
PROTHROMBIN TIME: 10.8 SEC (ref 9–12.5)
RBC # BLD AUTO: 5.15 M/UL (ref 4.6–6.2)
WBC # BLD AUTO: 5.92 K/UL (ref 3.9–12.7)

## 2021-02-22 PROCEDURE — 80053 COMPREHEN METABOLIC PANEL: CPT

## 2021-02-22 PROCEDURE — 85610 PROTHROMBIN TIME: CPT

## 2021-02-22 PROCEDURE — 36415 COLL VENOUS BLD VENIPUNCTURE: CPT | Mod: PO

## 2021-02-22 PROCEDURE — 85025 COMPLETE CBC W/AUTO DIFF WBC: CPT

## 2021-02-22 PROCEDURE — 80197 ASSAY OF TACROLIMUS: CPT

## 2021-02-23 LAB
ALBUMIN SERPL BCP-MCNC: 4 G/DL (ref 3.5–5.2)
ALP SERPL-CCNC: 68 U/L (ref 55–135)
ALT SERPL W/O P-5'-P-CCNC: 21 U/L (ref 10–44)
ANION GAP SERPL CALC-SCNC: 7 MMOL/L (ref 8–16)
AST SERPL-CCNC: 22 U/L (ref 10–40)
BILIRUB SERPL-MCNC: 0.6 MG/DL (ref 0.1–1)
BUN SERPL-MCNC: 8 MG/DL (ref 8–23)
CALCIUM SERPL-MCNC: 9 MG/DL (ref 8.7–10.5)
CHLORIDE SERPL-SCNC: 101 MMOL/L (ref 95–110)
CO2 SERPL-SCNC: 28 MMOL/L (ref 23–29)
CREAT SERPL-MCNC: 0.9 MG/DL (ref 0.5–1.4)
EST. GFR  (AFRICAN AMERICAN): >60 ML/MIN/1.73 M^2
EST. GFR  (NON AFRICAN AMERICAN): >60 ML/MIN/1.73 M^2
GLUCOSE SERPL-MCNC: 200 MG/DL (ref 70–110)
POTASSIUM SERPL-SCNC: 4.1 MMOL/L (ref 3.5–5.1)
PROT SERPL-MCNC: 7.6 G/DL (ref 6–8.4)
SODIUM SERPL-SCNC: 136 MMOL/L (ref 136–145)
TACROLIMUS BLD-MCNC: 2.8 NG/ML (ref 5–15)

## 2021-03-01 ENCOUNTER — TELEPHONE (OUTPATIENT)
Dept: TRANSPLANT | Facility: CLINIC | Age: 66
End: 2021-03-01

## 2021-03-01 DIAGNOSIS — Z94.4 LIVER REPLACED BY TRANSPLANT: Primary | ICD-10-CM

## 2021-03-11 DIAGNOSIS — K74.60 HEPATIC CIRRHOSIS, UNSPECIFIED HEPATIC CIRRHOSIS TYPE, UNSPECIFIED WHETHER ASCITES PRESENT: Primary | ICD-10-CM

## 2021-04-06 ENCOUNTER — TELEPHONE (OUTPATIENT)
Dept: RADIOLOGY | Facility: HOSPITAL | Age: 66
End: 2021-04-06

## 2021-04-08 ENCOUNTER — TELEPHONE (OUTPATIENT)
Dept: HEPATOLOGY | Facility: CLINIC | Age: 66
End: 2021-04-08

## 2021-05-03 NOTE — TELEPHONE ENCOUNTER
Labs reviewed and stable. Next labs 12/30/19. Letter mailed.   The patient is a 61y Male complaining of abdominal pain.

## 2021-05-24 ENCOUNTER — LAB VISIT (OUTPATIENT)
Dept: LAB | Facility: HOSPITAL | Age: 66
End: 2021-05-24
Attending: INTERNAL MEDICINE
Payer: MEDICARE

## 2021-05-24 DIAGNOSIS — K74.60 HEPATIC CIRRHOSIS, UNSPECIFIED HEPATIC CIRRHOSIS TYPE, UNSPECIFIED WHETHER ASCITES PRESENT: ICD-10-CM

## 2021-05-24 DIAGNOSIS — Z94.4 LIVER TRANSPLANTED: ICD-10-CM

## 2021-05-24 LAB
BASOPHILS # BLD AUTO: 0.1 K/UL (ref 0–0.2)
BASOPHILS NFR BLD: 1.5 % (ref 0–1.9)
DIFFERENTIAL METHOD: ABNORMAL
EOSINOPHIL # BLD AUTO: 0.2 K/UL (ref 0–0.5)
EOSINOPHIL NFR BLD: 3.1 % (ref 0–8)
ERYTHROCYTE [DISTWIDTH] IN BLOOD BY AUTOMATED COUNT: 13 % (ref 11.5–14.5)
HCT VFR BLD AUTO: 49.2 % (ref 40–54)
HGB BLD-MCNC: 17.1 G/DL (ref 14–18)
IMM GRANULOCYTES # BLD AUTO: 0.03 K/UL (ref 0–0.04)
IMM GRANULOCYTES NFR BLD AUTO: 0.4 % (ref 0–0.5)
INR PPP: 1 (ref 0.8–1.2)
LYMPHOCYTES # BLD AUTO: 1.8 K/UL (ref 1–4.8)
LYMPHOCYTES NFR BLD: 26.5 % (ref 18–48)
MCH RBC QN AUTO: 32.4 PG (ref 27–31)
MCHC RBC AUTO-ENTMCNC: 34.8 G/DL (ref 32–36)
MCV RBC AUTO: 93 FL (ref 82–98)
MONOCYTES # BLD AUTO: 0.6 K/UL (ref 0.3–1)
MONOCYTES NFR BLD: 8.9 % (ref 4–15)
NEUTROPHILS # BLD AUTO: 4 K/UL (ref 1.8–7.7)
NEUTROPHILS NFR BLD: 59.6 % (ref 38–73)
NRBC BLD-RTO: 0 /100 WBC
PLATELET # BLD AUTO: 190 K/UL (ref 150–450)
PMV BLD AUTO: 12.9 FL (ref 9.2–12.9)
PROTHROMBIN TIME: 11.4 SEC (ref 9–12.5)
RBC # BLD AUTO: 5.27 M/UL (ref 4.6–6.2)
WBC # BLD AUTO: 6.76 K/UL (ref 3.9–12.7)

## 2021-05-24 PROCEDURE — 85610 PROTHROMBIN TIME: CPT | Performed by: INTERNAL MEDICINE

## 2021-05-24 PROCEDURE — 80053 COMPREHEN METABOLIC PANEL: CPT | Performed by: INTERNAL MEDICINE

## 2021-05-24 PROCEDURE — 80197 ASSAY OF TACROLIMUS: CPT | Performed by: INTERNAL MEDICINE

## 2021-05-24 PROCEDURE — 85025 COMPLETE CBC W/AUTO DIFF WBC: CPT | Performed by: INTERNAL MEDICINE

## 2021-05-24 PROCEDURE — 82105 ALPHA-FETOPROTEIN SERUM: CPT | Performed by: INTERNAL MEDICINE

## 2021-05-24 PROCEDURE — 36415 COLL VENOUS BLD VENIPUNCTURE: CPT | Mod: PO | Performed by: INTERNAL MEDICINE

## 2021-05-25 LAB
AFP SERPL-MCNC: 2.5 NG/ML (ref 0–8.4)
ALBUMIN SERPL BCP-MCNC: 3.6 G/DL (ref 3.5–5.2)
ALP SERPL-CCNC: 70 U/L (ref 55–135)
ALT SERPL W/O P-5'-P-CCNC: 15 U/L (ref 10–44)
ANION GAP SERPL CALC-SCNC: 11 MMOL/L (ref 8–16)
AST SERPL-CCNC: 19 U/L (ref 10–40)
BILIRUB SERPL-MCNC: 0.6 MG/DL (ref 0.1–1)
BUN SERPL-MCNC: 8 MG/DL (ref 8–23)
CALCIUM SERPL-MCNC: 9.5 MG/DL (ref 8.7–10.5)
CHLORIDE SERPL-SCNC: 101 MMOL/L (ref 95–110)
CO2 SERPL-SCNC: 25 MMOL/L (ref 23–29)
CREAT SERPL-MCNC: 0.9 MG/DL (ref 0.5–1.4)
EST. GFR  (AFRICAN AMERICAN): >60 ML/MIN/1.73 M^2
EST. GFR  (NON AFRICAN AMERICAN): >60 ML/MIN/1.73 M^2
GLUCOSE SERPL-MCNC: 211 MG/DL (ref 70–110)
POTASSIUM SERPL-SCNC: 4.2 MMOL/L (ref 3.5–5.1)
PROT SERPL-MCNC: 7.5 G/DL (ref 6–8.4)
SODIUM SERPL-SCNC: 137 MMOL/L (ref 136–145)
TACROLIMUS BLD-MCNC: 5 NG/ML (ref 5–15)
TACROLIMUS, NORMALIZED: 4.6 NG/ML (ref 5–15)

## 2021-05-26 ENCOUNTER — TELEPHONE (OUTPATIENT)
Dept: TRANSPLANT | Facility: CLINIC | Age: 66
End: 2021-05-26

## 2021-06-09 ENCOUNTER — TELEPHONE (OUTPATIENT)
Dept: TRANSPLANT | Facility: CLINIC | Age: 66
End: 2021-06-09

## 2021-06-15 ENCOUNTER — TELEPHONE (OUTPATIENT)
Dept: HEPATOLOGY | Facility: CLINIC | Age: 66
End: 2021-06-15

## 2021-06-16 ENCOUNTER — TELEPHONE (OUTPATIENT)
Dept: TRANSPLANT | Facility: CLINIC | Age: 66
End: 2021-06-16

## 2021-06-18 ENCOUNTER — TELEPHONE (OUTPATIENT)
Dept: HEPATOLOGY | Facility: CLINIC | Age: 66
End: 2021-06-18

## 2021-06-28 ENCOUNTER — TELEPHONE (OUTPATIENT)
Dept: HEPATOLOGY | Facility: CLINIC | Age: 66
End: 2021-06-28

## 2021-07-19 DIAGNOSIS — C22.0 HCC (HEPATOCELLULAR CARCINOMA): ICD-10-CM

## 2021-07-19 DIAGNOSIS — Z94.4 LIVER REPLACED BY TRANSPLANT: Primary | ICD-10-CM

## 2021-08-20 ENCOUNTER — TELEPHONE (OUTPATIENT)
Dept: TRANSPLANT | Facility: CLINIC | Age: 66
End: 2021-08-20

## 2021-08-20 ENCOUNTER — LAB VISIT (OUTPATIENT)
Dept: LAB | Facility: HOSPITAL | Age: 66
End: 2021-08-20
Attending: INTERNAL MEDICINE
Payer: MEDICARE

## 2021-08-20 DIAGNOSIS — C22.0 HCC (HEPATOCELLULAR CARCINOMA): ICD-10-CM

## 2021-08-20 DIAGNOSIS — Z94.4 LIVER REPLACED BY TRANSPLANT: ICD-10-CM

## 2021-08-20 DIAGNOSIS — Z94.4 LIVER TRANSPLANTED: ICD-10-CM

## 2021-08-20 PROCEDURE — 82105 ALPHA-FETOPROTEIN SERUM: CPT | Performed by: INTERNAL MEDICINE

## 2021-08-20 PROCEDURE — 85610 PROTHROMBIN TIME: CPT | Performed by: INTERNAL MEDICINE

## 2021-08-20 PROCEDURE — 85025 COMPLETE CBC W/AUTO DIFF WBC: CPT | Performed by: INTERNAL MEDICINE

## 2021-08-20 PROCEDURE — 80053 COMPREHEN METABOLIC PANEL: CPT | Performed by: INTERNAL MEDICINE

## 2021-08-20 PROCEDURE — 80197 ASSAY OF TACROLIMUS: CPT | Performed by: INTERNAL MEDICINE

## 2021-08-20 PROCEDURE — 36415 COLL VENOUS BLD VENIPUNCTURE: CPT | Mod: PO | Performed by: INTERNAL MEDICINE

## 2021-08-21 LAB
AFP SERPL-MCNC: 3.4 NG/ML (ref 0–8.4)
ALBUMIN SERPL BCP-MCNC: 3.8 G/DL (ref 3.5–5.2)
ALP SERPL-CCNC: 64 U/L (ref 55–135)
ALT SERPL W/O P-5'-P-CCNC: 20 U/L (ref 10–44)
ANION GAP SERPL CALC-SCNC: 10 MMOL/L (ref 8–16)
AST SERPL-CCNC: 25 U/L (ref 10–40)
BASOPHILS # BLD AUTO: 0.07 K/UL (ref 0–0.2)
BASOPHILS NFR BLD: 1.1 % (ref 0–1.9)
BILIRUB SERPL-MCNC: 0.6 MG/DL (ref 0.1–1)
BUN SERPL-MCNC: 11 MG/DL (ref 8–23)
CALCIUM SERPL-MCNC: 9.1 MG/DL (ref 8.7–10.5)
CHLORIDE SERPL-SCNC: 100 MMOL/L (ref 95–110)
CO2 SERPL-SCNC: 26 MMOL/L (ref 23–29)
CREAT SERPL-MCNC: 0.8 MG/DL (ref 0.5–1.4)
DIFFERENTIAL METHOD: ABNORMAL
EOSINOPHIL # BLD AUTO: 0.1 K/UL (ref 0–0.5)
EOSINOPHIL NFR BLD: 2.1 % (ref 0–8)
ERYTHROCYTE [DISTWIDTH] IN BLOOD BY AUTOMATED COUNT: 13.8 % (ref 11.5–14.5)
EST. GFR  (AFRICAN AMERICAN): >60 ML/MIN/1.73 M^2
EST. GFR  (NON AFRICAN AMERICAN): >60 ML/MIN/1.73 M^2
GLUCOSE SERPL-MCNC: 235 MG/DL (ref 70–110)
HCT VFR BLD AUTO: 49.1 % (ref 40–54)
HGB BLD-MCNC: 16.9 G/DL (ref 14–18)
IMM GRANULOCYTES # BLD AUTO: 0.03 K/UL (ref 0–0.04)
IMM GRANULOCYTES NFR BLD AUTO: 0.5 % (ref 0–0.5)
INR PPP: 1 (ref 0.8–1.2)
LYMPHOCYTES # BLD AUTO: 1.3 K/UL (ref 1–4.8)
LYMPHOCYTES NFR BLD: 20.9 % (ref 18–48)
MCH RBC QN AUTO: 32.3 PG (ref 27–31)
MCHC RBC AUTO-ENTMCNC: 34.4 G/DL (ref 32–36)
MCV RBC AUTO: 94 FL (ref 82–98)
MONOCYTES # BLD AUTO: 0.6 K/UL (ref 0.3–1)
MONOCYTES NFR BLD: 9.9 % (ref 4–15)
NEUTROPHILS # BLD AUTO: 4 K/UL (ref 1.8–7.7)
NEUTROPHILS NFR BLD: 65.5 % (ref 38–73)
NRBC BLD-RTO: 0 /100 WBC
PLATELET # BLD AUTO: 187 K/UL (ref 150–450)
PMV BLD AUTO: 13.2 FL (ref 9.2–12.9)
POTASSIUM SERPL-SCNC: 4.3 MMOL/L (ref 3.5–5.1)
PROT SERPL-MCNC: 7.5 G/DL (ref 6–8.4)
PROTHROMBIN TIME: 11.1 SEC (ref 9–12.5)
RBC # BLD AUTO: 5.23 M/UL (ref 4.6–6.2)
SODIUM SERPL-SCNC: 136 MMOL/L (ref 136–145)
TACROLIMUS BLD-MCNC: 4.7 NG/ML (ref 5–15)
TACROLIMUS, NORMALIZED: 4.3 NG/ML (ref 5–15)
WBC # BLD AUTO: 6.17 K/UL (ref 3.9–12.7)

## 2021-08-24 ENCOUNTER — TELEPHONE (OUTPATIENT)
Dept: TRANSPLANT | Facility: CLINIC | Age: 66
End: 2021-08-24

## 2021-10-05 ENCOUNTER — TELEPHONE (OUTPATIENT)
Dept: RADIOLOGY | Facility: HOSPITAL | Age: 66
End: 2021-10-05

## 2021-10-06 ENCOUNTER — TELEPHONE (OUTPATIENT)
Dept: TRANSPLANT | Facility: CLINIC | Age: 66
End: 2021-10-06

## 2021-10-06 ENCOUNTER — HOSPITAL ENCOUNTER (OUTPATIENT)
Dept: RADIOLOGY | Facility: HOSPITAL | Age: 66
Discharge: HOME | End: 2021-10-06
Attending: INTERNAL MEDICINE
Payer: MEDICARE

## 2021-10-06 ENCOUNTER — OFFICE VISIT (OUTPATIENT)
Dept: HEPATOLOGY | Facility: CLINIC | Age: 66
End: 2021-10-06
Payer: MEDICARE

## 2021-10-06 VITALS
BODY MASS INDEX: 33.46 KG/M2 | HEART RATE: 72 BPM | HEIGHT: 70 IN | SYSTOLIC BLOOD PRESSURE: 128 MMHG | DIASTOLIC BLOOD PRESSURE: 72 MMHG | WEIGHT: 233.69 LBS

## 2021-10-06 DIAGNOSIS — I25.10 CORONARY ARTERY DISEASE, UNSPECIFIED VESSEL OR LESION TYPE, UNSPECIFIED WHETHER ANGINA PRESENT, UNSPECIFIED WHETHER NATIVE OR TRANSPLANTED HEART: Primary | ICD-10-CM

## 2021-10-06 DIAGNOSIS — K74.60 HEPATIC CIRRHOSIS, UNSPECIFIED HEPATIC CIRRHOSIS TYPE, UNSPECIFIED WHETHER ASCITES PRESENT: ICD-10-CM

## 2021-10-06 DIAGNOSIS — Z94.4 S/P LIVER TRANSPLANT: ICD-10-CM

## 2021-10-06 DIAGNOSIS — K74.69 OTHER CIRRHOSIS OF LIVER: ICD-10-CM

## 2021-10-06 DIAGNOSIS — D84.9 IMMUNOSUPPRESSION: ICD-10-CM

## 2021-10-06 PROCEDURE — 1160F RVW MEDS BY RX/DR IN RCRD: CPT | Mod: CPTII,S$GLB,, | Performed by: INTERNAL MEDICINE

## 2021-10-06 PROCEDURE — 1160F PR REVIEW ALL MEDS BY PRESCRIBER/CLIN PHARMACIST DOCUMENTED: ICD-10-PCS | Mod: CPTII,S$GLB,, | Performed by: INTERNAL MEDICINE

## 2021-10-06 PROCEDURE — 1101F PT FALLS ASSESS-DOCD LE1/YR: CPT | Mod: CPTII,S$GLB,, | Performed by: INTERNAL MEDICINE

## 2021-10-06 PROCEDURE — 93975 US LIVER TRANSPLANT POST: ICD-10-PCS | Mod: 26,,, | Performed by: RADIOLOGY

## 2021-10-06 PROCEDURE — 99999 PR PBB SHADOW E&M-EST. PATIENT-LVL IV: CPT | Mod: PBBFAC,,, | Performed by: INTERNAL MEDICINE

## 2021-10-06 PROCEDURE — 1126F AMNT PAIN NOTED NONE PRSNT: CPT | Mod: CPTII,S$GLB,, | Performed by: INTERNAL MEDICINE

## 2021-10-06 PROCEDURE — 3078F PR MOST RECENT DIASTOLIC BLOOD PRESSURE < 80 MM HG: ICD-10-PCS | Mod: CPTII,S$GLB,, | Performed by: INTERNAL MEDICINE

## 2021-10-06 PROCEDURE — 1101F PR PT FALLS ASSESS DOC 0-1 FALLS W/OUT INJ PAST YR: ICD-10-PCS | Mod: CPTII,S$GLB,, | Performed by: INTERNAL MEDICINE

## 2021-10-06 PROCEDURE — 1126F PR PAIN SEVERITY QUANTIFIED, NO PAIN PRESENT: ICD-10-PCS | Mod: CPTII,S$GLB,, | Performed by: INTERNAL MEDICINE

## 2021-10-06 PROCEDURE — 99214 OFFICE O/P EST MOD 30 MIN: CPT | Mod: S$GLB,,, | Performed by: INTERNAL MEDICINE

## 2021-10-06 PROCEDURE — 4010F ACE/ARB THERAPY RXD/TAKEN: CPT | Mod: CPTII,S$GLB,, | Performed by: INTERNAL MEDICINE

## 2021-10-06 PROCEDURE — 1159F PR MEDICATION LIST DOCUMENTED IN MEDICAL RECORD: ICD-10-PCS | Mod: CPTII,S$GLB,, | Performed by: INTERNAL MEDICINE

## 2021-10-06 PROCEDURE — 3288F PR FALLS RISK ASSESSMENT DOCUMENTED: ICD-10-PCS | Mod: CPTII,S$GLB,, | Performed by: INTERNAL MEDICINE

## 2021-10-06 PROCEDURE — 3078F DIAST BP <80 MM HG: CPT | Mod: CPTII,S$GLB,, | Performed by: INTERNAL MEDICINE

## 2021-10-06 PROCEDURE — 3074F SYST BP LT 130 MM HG: CPT | Mod: CPTII,S$GLB,, | Performed by: INTERNAL MEDICINE

## 2021-10-06 PROCEDURE — 99214 PR OFFICE/OUTPT VISIT, EST, LEVL IV, 30-39 MIN: ICD-10-PCS | Mod: S$GLB,,, | Performed by: INTERNAL MEDICINE

## 2021-10-06 PROCEDURE — 1159F MED LIST DOCD IN RCRD: CPT | Mod: CPTII,S$GLB,, | Performed by: INTERNAL MEDICINE

## 2021-10-06 PROCEDURE — 3288F FALL RISK ASSESSMENT DOCD: CPT | Mod: CPTII,S$GLB,, | Performed by: INTERNAL MEDICINE

## 2021-10-06 PROCEDURE — 3008F PR BODY MASS INDEX (BMI) DOCUMENTED: ICD-10-PCS | Mod: CPTII,S$GLB,, | Performed by: INTERNAL MEDICINE

## 2021-10-06 PROCEDURE — 76705 US LIVER TRANSPLANT POST: ICD-10-PCS | Mod: 26,XS,, | Performed by: RADIOLOGY

## 2021-10-06 PROCEDURE — 99999 PR PBB SHADOW E&M-EST. PATIENT-LVL IV: ICD-10-PCS | Mod: PBBFAC,,, | Performed by: INTERNAL MEDICINE

## 2021-10-06 PROCEDURE — 3008F BODY MASS INDEX DOCD: CPT | Mod: CPTII,S$GLB,, | Performed by: INTERNAL MEDICINE

## 2021-10-06 PROCEDURE — 76705 ECHO EXAM OF ABDOMEN: CPT | Mod: 26,XS,, | Performed by: RADIOLOGY

## 2021-10-06 PROCEDURE — 4010F PR ACE/ARB THEARPY RXD/TAKEN: ICD-10-PCS | Mod: CPTII,S$GLB,, | Performed by: INTERNAL MEDICINE

## 2021-10-06 PROCEDURE — 93975 VASCULAR STUDY: CPT | Mod: TC

## 2021-10-06 PROCEDURE — 3074F PR MOST RECENT SYSTOLIC BLOOD PRESSURE < 130 MM HG: ICD-10-PCS | Mod: CPTII,S$GLB,, | Performed by: INTERNAL MEDICINE

## 2021-10-06 PROCEDURE — 93975 VASCULAR STUDY: CPT | Mod: 26,,, | Performed by: RADIOLOGY

## 2021-10-06 RX ORDER — SODIUM, POTASSIUM,MAG SULFATES 17.5-3.13G
SOLUTION, RECONSTITUTED, ORAL ORAL
Qty: 1 BOTTLE | Refills: 0 | Status: CANCELLED | OUTPATIENT
Start: 2021-10-06

## 2021-12-01 ENCOUNTER — TELEPHONE (OUTPATIENT)
Dept: TRANSPLANT | Facility: CLINIC | Age: 66
End: 2021-12-01
Payer: MEDICARE

## 2021-12-07 ENCOUNTER — LAB VISIT (OUTPATIENT)
Dept: LAB | Facility: HOSPITAL | Age: 66
End: 2021-12-07
Attending: INTERNAL MEDICINE
Payer: MEDICARE

## 2021-12-07 DIAGNOSIS — C22.0 HCC (HEPATOCELLULAR CARCINOMA): ICD-10-CM

## 2021-12-07 DIAGNOSIS — Z94.4 LIVER REPLACED BY TRANSPLANT: ICD-10-CM

## 2021-12-07 LAB
AFP SERPL-MCNC: 2.5 NG/ML (ref 0–8.4)
ALBUMIN SERPL BCP-MCNC: 3.9 G/DL (ref 3.5–5.2)
ALP SERPL-CCNC: 79 U/L (ref 55–135)
ALT SERPL W/O P-5'-P-CCNC: 15 U/L (ref 10–44)
ANION GAP SERPL CALC-SCNC: 12 MMOL/L (ref 8–16)
AST SERPL-CCNC: 16 U/L (ref 10–40)
BASOPHILS # BLD AUTO: 0.11 K/UL (ref 0–0.2)
BASOPHILS NFR BLD: 1.5 % (ref 0–1.9)
BILIRUB SERPL-MCNC: 0.7 MG/DL (ref 0.1–1)
BUN SERPL-MCNC: 9 MG/DL (ref 8–23)
CALCIUM SERPL-MCNC: 9.5 MG/DL (ref 8.7–10.5)
CHLORIDE SERPL-SCNC: 99 MMOL/L (ref 95–110)
CO2 SERPL-SCNC: 27 MMOL/L (ref 23–29)
CREAT SERPL-MCNC: 0.9 MG/DL (ref 0.5–1.4)
DIFFERENTIAL METHOD: ABNORMAL
EOSINOPHIL # BLD AUTO: 0.2 K/UL (ref 0–0.5)
EOSINOPHIL NFR BLD: 2.8 % (ref 0–8)
ERYTHROCYTE [DISTWIDTH] IN BLOOD BY AUTOMATED COUNT: 12.6 % (ref 11.5–14.5)
EST. GFR  (AFRICAN AMERICAN): >60 ML/MIN/1.73 M^2
EST. GFR  (NON AFRICAN AMERICAN): >60 ML/MIN/1.73 M^2
GLUCOSE SERPL-MCNC: 171 MG/DL (ref 70–110)
HCT VFR BLD AUTO: 49 % (ref 40–54)
HGB BLD-MCNC: 17.3 G/DL (ref 14–18)
IMM GRANULOCYTES # BLD AUTO: 0.1 K/UL (ref 0–0.04)
IMM GRANULOCYTES NFR BLD AUTO: 1.3 % (ref 0–0.5)
INR PPP: 1 (ref 0.8–1.2)
LYMPHOCYTES # BLD AUTO: 1.5 K/UL (ref 1–4.8)
LYMPHOCYTES NFR BLD: 19.3 % (ref 18–48)
MCH RBC QN AUTO: 32.1 PG (ref 27–31)
MCHC RBC AUTO-ENTMCNC: 35.3 G/DL (ref 32–36)
MCV RBC AUTO: 91 FL (ref 82–98)
MONOCYTES # BLD AUTO: 0.6 K/UL (ref 0.3–1)
MONOCYTES NFR BLD: 8.1 % (ref 4–15)
NEUTROPHILS # BLD AUTO: 5.1 K/UL (ref 1.8–7.7)
NEUTROPHILS NFR BLD: 67 % (ref 38–73)
NRBC BLD-RTO: 0 /100 WBC
OVALOCYTES BLD QL SMEAR: ABNORMAL
PLATELET # BLD AUTO: 146 K/UL (ref 150–450)
PLATELET BLD QL SMEAR: ABNORMAL
PMV BLD AUTO: 12.7 FL (ref 9.2–12.9)
POTASSIUM SERPL-SCNC: 4.1 MMOL/L (ref 3.5–5.1)
PROT SERPL-MCNC: 7.7 G/DL (ref 6–8.4)
PROTHROMBIN TIME: 10.9 SEC (ref 9–12.5)
RBC # BLD AUTO: 5.39 M/UL (ref 4.6–6.2)
SODIUM SERPL-SCNC: 138 MMOL/L (ref 136–145)
WBC # BLD AUTO: 7.57 K/UL (ref 3.9–12.7)

## 2021-12-07 PROCEDURE — 36415 COLL VENOUS BLD VENIPUNCTURE: CPT | Mod: PO | Performed by: INTERNAL MEDICINE

## 2021-12-07 PROCEDURE — 80053 COMPREHEN METABOLIC PANEL: CPT | Performed by: INTERNAL MEDICINE

## 2021-12-07 PROCEDURE — 82105 ALPHA-FETOPROTEIN SERUM: CPT | Performed by: INTERNAL MEDICINE

## 2021-12-07 PROCEDURE — 80197 ASSAY OF TACROLIMUS: CPT | Performed by: INTERNAL MEDICINE

## 2021-12-07 PROCEDURE — 85025 COMPLETE CBC W/AUTO DIFF WBC: CPT | Performed by: INTERNAL MEDICINE

## 2021-12-07 PROCEDURE — 85610 PROTHROMBIN TIME: CPT | Performed by: INTERNAL MEDICINE

## 2021-12-08 ENCOUNTER — TELEPHONE (OUTPATIENT)
Dept: TRANSPLANT | Facility: CLINIC | Age: 66
End: 2021-12-08
Payer: MEDICARE

## 2021-12-08 LAB — TACROLIMUS BLD-MCNC: 5.1 NG/ML (ref 5–15)

## 2021-12-09 DIAGNOSIS — K74.60 HEPATIC CIRRHOSIS, UNSPECIFIED HEPATIC CIRRHOSIS TYPE, UNSPECIFIED WHETHER ASCITES PRESENT: ICD-10-CM

## 2021-12-09 DIAGNOSIS — C22.0 HCC (HEPATOCELLULAR CARCINOMA): ICD-10-CM

## 2021-12-09 DIAGNOSIS — Z94.4 LIVER REPLACED BY TRANSPLANT: Primary | ICD-10-CM

## 2021-12-14 ENCOUNTER — TELEPHONE (OUTPATIENT)
Dept: CARDIOLOGY | Facility: HOSPITAL | Age: 66
End: 2021-12-14
Payer: MEDICARE

## 2021-12-20 DIAGNOSIS — Z76.89 ESTABLISHING CARE WITH NEW DOCTOR, ENCOUNTER FOR: Primary | ICD-10-CM

## 2022-01-12 ENCOUNTER — TELEPHONE (OUTPATIENT)
Dept: HEPATOLOGY | Facility: CLINIC | Age: 67
End: 2022-01-12
Payer: MEDICARE

## 2022-04-06 ENCOUNTER — HOSPITAL ENCOUNTER (OUTPATIENT)
Dept: RADIOLOGY | Facility: HOSPITAL | Age: 67
Discharge: HOME | End: 2022-04-06
Attending: INTERNAL MEDICINE
Payer: MEDICARE

## 2022-04-06 ENCOUNTER — OFFICE VISIT (OUTPATIENT)
Dept: HEPATOLOGY | Facility: CLINIC | Age: 67
End: 2022-04-06
Payer: MEDICARE

## 2022-04-06 ENCOUNTER — TELEPHONE (OUTPATIENT)
Dept: CARDIOLOGY | Facility: HOSPITAL | Age: 67
End: 2022-04-06
Payer: MEDICAID

## 2022-04-06 VITALS
DIASTOLIC BLOOD PRESSURE: 74 MMHG | SYSTOLIC BLOOD PRESSURE: 134 MMHG | WEIGHT: 224.19 LBS | HEART RATE: 96 BPM | BODY MASS INDEX: 32.1 KG/M2 | OXYGEN SATURATION: 98 % | HEIGHT: 70 IN

## 2022-04-06 DIAGNOSIS — D84.9 IMMUNOSUPPRESSION: ICD-10-CM

## 2022-04-06 DIAGNOSIS — Z94.4 LIVER TRANSPLANTED: ICD-10-CM

## 2022-04-06 DIAGNOSIS — Z12.11 COLON CANCER SCREENING: ICD-10-CM

## 2022-04-06 DIAGNOSIS — K74.69 OTHER CIRRHOSIS OF LIVER: Primary | ICD-10-CM

## 2022-04-06 DIAGNOSIS — K74.69 OTHER CIRRHOSIS OF LIVER: ICD-10-CM

## 2022-04-06 DIAGNOSIS — I25.10 CORONARY ARTERY DISEASE, UNSPECIFIED VESSEL OR LESION TYPE, UNSPECIFIED WHETHER ANGINA PRESENT, UNSPECIFIED WHETHER NATIVE OR TRANSPLANTED HEART: ICD-10-CM

## 2022-04-06 PROCEDURE — 3075F PR MOST RECENT SYSTOLIC BLOOD PRESS GE 130-139MM HG: ICD-10-PCS | Mod: CPTII,S$GLB,, | Performed by: INTERNAL MEDICINE

## 2022-04-06 PROCEDURE — 1160F RVW MEDS BY RX/DR IN RCRD: CPT | Mod: CPTII,S$GLB,, | Performed by: INTERNAL MEDICINE

## 2022-04-06 PROCEDURE — 99999 PR PBB SHADOW E&M-EST. PATIENT-LVL IV: ICD-10-PCS | Mod: PBBFAC,,, | Performed by: INTERNAL MEDICINE

## 2022-04-06 PROCEDURE — 76705 ECHO EXAM OF ABDOMEN: CPT | Mod: 26,,, | Performed by: RADIOLOGY

## 2022-04-06 PROCEDURE — 99213 OFFICE O/P EST LOW 20 MIN: CPT | Mod: S$GLB,,, | Performed by: INTERNAL MEDICINE

## 2022-04-06 PROCEDURE — 3288F PR FALLS RISK ASSESSMENT DOCUMENTED: ICD-10-PCS | Mod: CPTII,S$GLB,, | Performed by: INTERNAL MEDICINE

## 2022-04-06 PROCEDURE — 99214 OFFICE O/P EST MOD 30 MIN: CPT | Mod: PBBFAC,25 | Performed by: INTERNAL MEDICINE

## 2022-04-06 PROCEDURE — 1101F PR PT FALLS ASSESS DOC 0-1 FALLS W/OUT INJ PAST YR: ICD-10-PCS | Mod: CPTII,S$GLB,, | Performed by: INTERNAL MEDICINE

## 2022-04-06 PROCEDURE — 3288F FALL RISK ASSESSMENT DOCD: CPT | Mod: CPTII,S$GLB,, | Performed by: INTERNAL MEDICINE

## 2022-04-06 PROCEDURE — 76705 US ABDOMEN LIMITED: ICD-10-PCS | Mod: 26,,, | Performed by: RADIOLOGY

## 2022-04-06 PROCEDURE — 99213 PR OFFICE/OUTPT VISIT, EST, LEVL III, 20-29 MIN: ICD-10-PCS | Mod: S$GLB,,, | Performed by: INTERNAL MEDICINE

## 2022-04-06 PROCEDURE — 1159F MED LIST DOCD IN RCRD: CPT | Mod: CPTII,S$GLB,, | Performed by: INTERNAL MEDICINE

## 2022-04-06 PROCEDURE — 1101F PT FALLS ASSESS-DOCD LE1/YR: CPT | Mod: CPTII,S$GLB,, | Performed by: INTERNAL MEDICINE

## 2022-04-06 PROCEDURE — 99999 PR PBB SHADOW E&M-EST. PATIENT-LVL IV: CPT | Mod: PBBFAC,,, | Performed by: INTERNAL MEDICINE

## 2022-04-06 PROCEDURE — 1126F PR PAIN SEVERITY QUANTIFIED, NO PAIN PRESENT: ICD-10-PCS | Mod: CPTII,S$GLB,, | Performed by: INTERNAL MEDICINE

## 2022-04-06 PROCEDURE — 76705 ECHO EXAM OF ABDOMEN: CPT | Mod: TC

## 2022-04-06 PROCEDURE — 1160F PR REVIEW ALL MEDS BY PRESCRIBER/CLIN PHARMACIST DOCUMENTED: ICD-10-PCS | Mod: CPTII,S$GLB,, | Performed by: INTERNAL MEDICINE

## 2022-04-06 PROCEDURE — 3075F SYST BP GE 130 - 139MM HG: CPT | Mod: CPTII,S$GLB,, | Performed by: INTERNAL MEDICINE

## 2022-04-06 PROCEDURE — 1159F PR MEDICATION LIST DOCUMENTED IN MEDICAL RECORD: ICD-10-PCS | Mod: CPTII,S$GLB,, | Performed by: INTERNAL MEDICINE

## 2022-04-06 PROCEDURE — 3008F PR BODY MASS INDEX (BMI) DOCUMENTED: ICD-10-PCS | Mod: CPTII,S$GLB,, | Performed by: INTERNAL MEDICINE

## 2022-04-06 PROCEDURE — 1126F AMNT PAIN NOTED NONE PRSNT: CPT | Mod: CPTII,S$GLB,, | Performed by: INTERNAL MEDICINE

## 2022-04-06 PROCEDURE — 3078F PR MOST RECENT DIASTOLIC BLOOD PRESSURE < 80 MM HG: ICD-10-PCS | Mod: CPTII,S$GLB,, | Performed by: INTERNAL MEDICINE

## 2022-04-06 PROCEDURE — 3008F BODY MASS INDEX DOCD: CPT | Mod: CPTII,S$GLB,, | Performed by: INTERNAL MEDICINE

## 2022-04-06 PROCEDURE — 3078F DIAST BP <80 MM HG: CPT | Mod: CPTII,S$GLB,, | Performed by: INTERNAL MEDICINE

## 2022-04-06 RX ORDER — TACROLIMUS 1 MG/1
2 CAPSULE ORAL EVERY 12 HOURS
Qty: 360 CAPSULE | Refills: 3 | Status: SHIPPED | OUTPATIENT
Start: 2022-04-06 | End: 2023-06-12 | Stop reason: SDUPTHER

## 2022-04-06 NOTE — PROGRESS NOTES
Transplant Hepatology  Liver Transplant Recipient Follow-up    Transplant Date: 12/29/2000  UNOS Native Liver Dx: Cirrhosis: Type B-, HBsAG+    Chapo is here for follow up of his liver transplant.    ORGAN: LIVER  Whole or Partial: whole liver      Subjective:     Interval History:  Currently, he is doing adequately. Current complaints include none. He has been feeling well but reports having imaging suggesting he may have extensive CAD so he has been taking nitroglycerin. He has not followed up with his cardiologist. He has followed up with CT surgery who is doing yearly monitoring of his aortic aneurysm.    He has not been taking his tacro correctly. He only takes it daily and not bid.    No evidence of liver decompensation: no ascites, confusion or GI bleeding.      Review of Systems    Objective:     Physical Exam  Vitals reviewed.   Constitutional:       General: He is not in acute distress.     Appearance: He is well-developed.   HENT:      Head: Normocephalic and atraumatic.      Mouth/Throat:      Pharynx: No oropharyngeal exudate.   Eyes:      General: No scleral icterus.        Right eye: No discharge.         Left eye: No discharge.      Conjunctiva/sclera: Conjunctivae normal.      Pupils: Pupils are equal, round, and reactive to light.   Pulmonary:      Effort: Pulmonary effort is normal. No respiratory distress.      Breath sounds: Normal breath sounds. No wheezing.   Abdominal:      General: There is no distension.      Palpations: Abdomen is soft.      Tenderness: There is no abdominal tenderness.   Neurological:      Mental Status: He is alert and oriented to person, place, and time.   Psychiatric:         Behavior: Behavior normal.         WBC   Date Value Ref Range Status   04/06/2022 9.62 3.90 - 12.70 K/uL Final     Hemoglobin   Date Value Ref Range Status   04/06/2022 16.0 14.0 - 18.0 g/dL Final     Hematocrit   Date Value Ref Range Status   04/06/2022 45.0 40.0 - 54.0 % Final     Platelets    Date Value Ref Range Status   04/06/2022 254 150 - 450 K/uL Final     BUN   Date Value Ref Range Status   04/06/2022 13 8 - 23 mg/dL Final     Creatinine   Date Value Ref Range Status   04/06/2022 0.9 0.5 - 1.4 mg/dL Final     Glucose   Date Value Ref Range Status   04/06/2022 236 (H) 70 - 110 mg/dL Final     Calcium   Date Value Ref Range Status   04/06/2022 9.4 8.7 - 10.5 mg/dL Final     Sodium   Date Value Ref Range Status   04/06/2022 137 136 - 145 mmol/L Final     Potassium   Date Value Ref Range Status   04/06/2022 4.1 3.5 - 5.1 mmol/L Final     Chloride   Date Value Ref Range Status   04/06/2022 101 95 - 110 mmol/L Final     AST   Date Value Ref Range Status   04/06/2022 17 10 - 40 U/L Final     ALT   Date Value Ref Range Status   04/06/2022 15 10 - 44 U/L Final     Alkaline Phosphatase   Date Value Ref Range Status   04/06/2022 69 55 - 135 U/L Final     Total Bilirubin   Date Value Ref Range Status   04/06/2022 0.5 0.1 - 1.0 mg/dL Final     Comment:     For infants and newborns, interpretation of results should be based  on gestational age, weight and in agreement with clinical  observations.    Premature Infant recommended reference ranges:  Up to 24 hours.............<8.0 mg/dL  Up to 48 hours............<12.0 mg/dL  3-5 days..................<15.0 mg/dL  6-29 days.................<15.0 mg/dL       Albumin   Date Value Ref Range Status   04/06/2022 4.0 3.5 - 5.2 g/dL Final     INR   Date Value Ref Range Status   04/06/2022 1.0 0.8 - 1.2 Final     Comment:     Coumadin Therapy:  2.0 - 3.0 for INR for all indicators except mechanical heart valves  and antiphospholipid syndromes which should use 2.5 - 3.5.       Lab Results   Component Value Date    TACROLIMUS 5.1 12/07/2021           Assessment/Plan:     1. Other cirrhosis of liver    2. Immunosuppression    3. Liver transplanted    4. Coronary artery disease, unspecified vessel or lesion type, unspecified whether angina present, unspecified whether native  or transplanted heart    5. Colon cancer screening      Other cirrhosis of liver-low meld, compensated  -Continue to monitor MELD  -Continue with HCC surveillance  -Needs EGD but needs cardiology clearance first  -     Comprehensive Metabolic Panel; Future; Expected date: 04/06/2022  -     CBC Auto Differential; Future; Expected date: 04/06/2022  -     AFP Tumor Marker; Future; Expected date: 04/06/2022  -     Protime-INR; Future; Expected date: 04/06/2022  -     US Abdomen Limited; Future; Expected date: 04/06/2022    Immunosuppression  -Advised he take the tacro correctly at 2mg bid  -Will monitor labs to see if dose can be reduced  -     Tacrolimus Level; Future; Expected date: 04/06/2022  -     tacrolimus (PROGRAF) 1 MG Cap; Take 2 capsules (2 mg total) by mouth every 12 (twelve) hours.  Dispense: 360 capsule; Refill: 3    Liver transplanted-cirrhosis but no decomp  -     Comprehensive Metabolic Panel; Future; Expected date: 04/06/2022  -     CBC Auto Differential; Future; Expected date: 04/06/2022  -     Tacrolimus Level; Future; Expected date: 04/06/2022  -     tacrolimus (PROGRAF) 1 MG Cap; Take 2 capsules (2 mg total) by mouth every 12 (twelve) hours.  Dispense: 360 capsule; Refill: 3    Coronary artery disease, unspecified vessel or lesion type, unspecified whether angina present, unspecified whether native or transplanted heart  -Needs to see cardiology for reassessment    Colon cancer screening  -Needs colonoscopy but needs cardiac clearance first    RTC in 6 months with preclinic labs and imaging     Patient was seen in the liver transplant department at [facility].      Viviana Reyes MD           [facility] Patient Status  Functional Status: 80% - Normal activity with effort: some symptoms of disease  Physical Capacity: No Limitations

## 2022-04-06 NOTE — TELEPHONE ENCOUNTER
Per Dr. Moncada      Pt only seen once before, March 2020.     Schedule f/u appt with any Cardiology provider asap for preop eval prior to GI procedures.     Dr Moncada    Tried all numbers no answer lvm to return call to schedule pre- op appt.

## 2022-04-06 NOTE — TELEPHONE ENCOUNTER
Pt only seen once before, March 2020.     Schedule f/u appt with any Cardiology provider asap for preop eval prior to GI procedures.     Dr Moncada

## 2022-04-07 ENCOUNTER — TELEPHONE (OUTPATIENT)
Dept: TRANSPLANT | Facility: CLINIC | Age: 67
End: 2022-04-07
Payer: MEDICAID

## 2022-04-07 NOTE — TELEPHONE ENCOUNTER
Letter sent to patient stating: Your labs have been reviewed by your Transplant physician, no action required. Next labs due 7/11/22          ----- Message from Viviana Reyes MD sent at 4/6/2022  1:23 PM CDT -----  Reviewed, nothing to do; repeat per routine

## 2022-04-07 NOTE — TELEPHONE ENCOUNTER
----- Message from Viviana Reyes MD sent at 4/6/2022  1:23 PM CDT -----  Glucose high, needs to discuss with his PCP.    MELD-Na score: 6 at 4/6/2022  8:40 AM  MELD score: 6 at 4/6/2022  8:40 AM  Calculated from:  Serum Creatinine: 0.9 mg/dL (Using min of 1 mg/dL) at 4/6/2022  8:40 AM  Serum Sodium: 137 mmol/L at 4/6/2022  8:40 AM  Total Bilirubin: 0.5 mg/dL (Using min of 1 mg/dL) at 4/6/2022  8:40 AM  INR(ratio): 1.0 at 4/6/2022  8:40 AM  Age: 66 years

## 2022-04-07 NOTE — LETTER
April 7, 2022    Chapo Odalis  [address] [facility] [address]          Dear Chapo Jacobo:  MRN: [id number]    This is a follow up to your recent labs, your lab results were stable.  There are no medicine changes.  Please have your labs drawn again on 7/11/22.      If you cannot have your labs drawn on the scheduled date, it is your responsibility to call the transplant department to reschedule.  Please call [phone] and ask to speak to Racheal FLOWER -  for all scheduling requests.     Sincerely,    Shanice VILLATORON, RN      Your Liver Transplant Coordinator    [facility]  [address] [place] [address] [phone]

## 2022-09-28 ENCOUNTER — HOSPITAL ENCOUNTER (EMERGENCY)
Facility: HOSPITAL | Age: 67
Discharge: HOME | End: 2022-09-29
Attending: EMERGENCY MEDICINE
Payer: MEDICARE

## 2022-09-28 DIAGNOSIS — G47.00 INSOMNIA, UNSPECIFIED TYPE: ICD-10-CM

## 2022-09-28 DIAGNOSIS — R07.9 CHEST PAIN: ICD-10-CM

## 2022-09-28 DIAGNOSIS — R53.1 WEAKNESS GENERALIZED: Primary | ICD-10-CM

## 2022-09-28 DIAGNOSIS — I71.20 THORACIC AORTIC ANEURYSM WITHOUT RUPTURE: ICD-10-CM

## 2022-09-28 LAB
ALBUMIN SERPL BCP-MCNC: 3.9 G/DL (ref 3.5–5.2)
ALP SERPL-CCNC: 74 U/L (ref 55–135)
ALT SERPL W/O P-5'-P-CCNC: 20 U/L (ref 10–44)
ANION GAP SERPL CALC-SCNC: 10 MMOL/L (ref 8–16)
AST SERPL-CCNC: 17 U/L (ref 10–40)
BASOPHILS # BLD AUTO: 0.11 K/UL (ref 0–0.2)
BASOPHILS NFR BLD: 1.2 % (ref 0–1.9)
BILIRUB SERPL-MCNC: 0.7 MG/DL (ref 0.1–1)
BNP SERPL-MCNC: 11 PG/ML (ref 0–99)
BUN SERPL-MCNC: 19 MG/DL (ref 8–23)
CALCIUM SERPL-MCNC: 9.3 MG/DL (ref 8.7–10.5)
CHLORIDE SERPL-SCNC: 99 MMOL/L (ref 95–110)
CO2 SERPL-SCNC: 22 MMOL/L (ref 23–29)
CREAT SERPL-MCNC: 1 MG/DL (ref 0.5–1.4)
DIFFERENTIAL METHOD: ABNORMAL
EOSINOPHIL # BLD AUTO: 0.1 K/UL (ref 0–0.5)
EOSINOPHIL NFR BLD: 1.5 % (ref 0–8)
ERYTHROCYTE [DISTWIDTH] IN BLOOD BY AUTOMATED COUNT: 12.5 % (ref 11.5–14.5)
EST. GFR  (NO RACE VARIABLE): >60 ML/MIN/1.73 M^2
GLUCOSE SERPL-MCNC: 336 MG/DL (ref 70–110)
HCT VFR BLD AUTO: 44.2 % (ref 40–54)
HGB BLD-MCNC: 16 G/DL (ref 14–18)
IMM GRANULOCYTES # BLD AUTO: 0.06 K/UL (ref 0–0.04)
IMM GRANULOCYTES NFR BLD AUTO: 0.7 % (ref 0–0.5)
LYMPHOCYTES # BLD AUTO: 1.8 K/UL (ref 1–4.8)
LYMPHOCYTES NFR BLD: 19.4 % (ref 18–48)
MCH RBC QN AUTO: 32.3 PG (ref 27–31)
MCHC RBC AUTO-ENTMCNC: 36.2 G/DL (ref 32–36)
MCV RBC AUTO: 89 FL (ref 82–98)
MONOCYTES # BLD AUTO: 0.8 K/UL (ref 0.3–1)
MONOCYTES NFR BLD: 8.6 % (ref 4–15)
NEUTROPHILS # BLD AUTO: 6.2 K/UL (ref 1.8–7.7)
NEUTROPHILS NFR BLD: 68.6 % (ref 38–73)
NRBC BLD-RTO: 0 /100 WBC
PLATELET # BLD AUTO: 228 K/UL (ref 150–450)
PMV BLD AUTO: 10.2 FL (ref 9.2–12.9)
POTASSIUM SERPL-SCNC: 3.8 MMOL/L (ref 3.5–5.1)
PROT SERPL-MCNC: 7.5 G/DL (ref 6–8.4)
RBC # BLD AUTO: 4.95 M/UL (ref 4.6–6.2)
SODIUM SERPL-SCNC: 131 MMOL/L (ref 136–145)
TROPONIN I SERPL DL<=0.01 NG/ML-MCNC: <0.006 NG/ML (ref 0–0.03)
TSH SERPL DL<=0.005 MIU/L-ACNC: 2.49 UIU/ML (ref 0.4–4)
WBC # BLD AUTO: 9.05 K/UL (ref 3.9–12.7)

## 2022-09-28 PROCEDURE — 93010 ELECTROCARDIOGRAM REPORT: CPT | Mod: ,,, | Performed by: INTERNAL MEDICINE

## 2022-09-28 PROCEDURE — 99285 EMERGENCY DEPT VISIT HI MDM: CPT | Mod: 25

## 2022-09-28 PROCEDURE — 36415 COLL VENOUS BLD VENIPUNCTURE: CPT | Performed by: NURSE PRACTITIONER

## 2022-09-28 PROCEDURE — 83880 ASSAY OF NATRIURETIC PEPTIDE: CPT | Performed by: NURSE PRACTITIONER

## 2022-09-28 PROCEDURE — 93010 EKG 12-LEAD: ICD-10-PCS | Mod: ,,, | Performed by: INTERNAL MEDICINE

## 2022-09-28 PROCEDURE — 85025 COMPLETE CBC W/AUTO DIFF WBC: CPT | Performed by: NURSE PRACTITIONER

## 2022-09-28 PROCEDURE — 93005 ELECTROCARDIOGRAM TRACING: CPT

## 2022-09-28 PROCEDURE — 84484 ASSAY OF TROPONIN QUANT: CPT | Performed by: NURSE PRACTITIONER

## 2022-09-28 PROCEDURE — 84443 ASSAY THYROID STIM HORMONE: CPT | Performed by: NURSE PRACTITIONER

## 2022-09-28 PROCEDURE — 80053 COMPREHEN METABOLIC PANEL: CPT | Performed by: NURSE PRACTITIONER

## 2022-09-28 RX ORDER — ASPIRIN 325 MG
325 TABLET ORAL
Status: DISCONTINUED | OUTPATIENT
Start: 2022-09-28 | End: 2022-09-29 | Stop reason: HOSPADM

## 2022-09-28 RX ADMIN — IOHEXOL 100 ML: 350 INJECTION, SOLUTION INTRAVENOUS at 11:09

## 2022-09-29 VITALS
RESPIRATION RATE: 16 BRPM | DIASTOLIC BLOOD PRESSURE: 68 MMHG | TEMPERATURE: 98 F | OXYGEN SATURATION: 98 % | HEIGHT: 70 IN | BODY MASS INDEX: 32.19 KG/M2 | HEART RATE: 87 BPM | SYSTOLIC BLOOD PRESSURE: 118 MMHG | WEIGHT: 224.88 LBS

## 2022-09-29 PROCEDURE — 25500020 PHARM REV CODE 255: Performed by: EMERGENCY MEDICINE

## 2022-09-29 RX ORDER — ZOLPIDEM TARTRATE 10 MG/1
10 TABLET ORAL NIGHTLY PRN
Qty: 30 TABLET | Refills: 0 | Status: SHIPPED | OUTPATIENT
Start: 2022-09-29 | End: 2023-07-03

## 2022-09-29 NOTE — ED PROVIDER NOTES
SCRIBE #1 NOTE: I, Robby Rodriguez, am scribing for, and in the presence of, Marie Velez MD. I have scribed the entire note.       History     Chief Complaint   Patient presents with    Chest Pain     Pt c/o of intermittent chest pain with hx of blockages and weakness.      Review of patient's allergies indicates:   Allergen Reactions    Codeine      Other reaction(s): Unknown    Darvocet a500 [propoxyphene n-acetaminophen]     Paroxetine hcl      Other reaction(s): Unknown    Penicillins      Other reaction(s): Anaphylaxis         History of Present Illness     HPI    9/28/2022, 9:55 PM  History obtained from the patient      History of Present Illness: Chapo Jacobo is a 66 y.o. male patient with a PMHx of diabetes, and liver transplant who presents to the Emergency Department for evaluation of CP which onset gradually x couple of months. Pt says he feels so exhausted he cannot preform day to day activities anymore. Symptoms are constant and moderate in severity. No mitigating or exacerbating factors reported. Associated sxs include dysuria. Patient denies any SOB, f/c, ST, cough, abd pain, and all other sxs at this time. Prior Tx includes nitroglycerin with relief of CP. No further complaints or concerns at this time.       Arrival mode: Personal vehicle      PCP: Sj sEcudero MD        Past Medical History:  Past Medical History:   Diagnosis Date    Anxiety     Appendicitis     Depression     Diabetes mellitus     Encounter for blood transfusion     Gallbladder & bile duct stone with obstruction     Herpes     Sleep apnea        Past Surgical History:  Past Surgical History:   Procedure Laterality Date    ABDOMINAL SURGERY      APPENDECTOMY      bilateral rotator cuff surgery      BRAIN SURGERY      CERVICAL FUSION      CHOLECYSTECTOMY      COLONOSCOPY N/A 7/10/2017    Procedure: COLONOSCOPY;  Surgeon: Viviana Reyes MD;  Location: [facility];  Service: Endoscopy;  Laterality: N/A;    LIVER TRANSPLANT       LUMBAR DISC SURGERY      TONSILLECTOMY           Family History:  Family History   Problem Relation Age of Onset    Arthritis Mother     Melanoma Father     Cancer Father         melanoma    Fibromyalgia Sister     Cancer Maternal Grandfather         unknown type       Social History:  Social History     Tobacco Use    Smoking status: Every Day     Packs/day: 1.00     Years: 46.00     Pack years: 46.00     Types: Cigarettes    Smokeless tobacco: Never   Substance and Sexual Activity    Alcohol use: Yes     Alcohol/week: 2.0 standard drinks     Types: 2 Cans of beer per week    Drug use: No    Sexual activity: Yes     Partners: Female        Review of Systems     Review of Systems   Constitutional:  Positive for activity change. Negative for chills and fever.   HENT:  Negative for sore throat.    Respiratory:  Negative for cough and shortness of breath.    Cardiovascular:  Positive for chest pain.   Gastrointestinal:  Negative for abdominal pain and nausea.   Genitourinary:  Positive for dysuria.   Musculoskeletal:  Negative for back pain.   Skin:  Negative for rash.   Neurological:  Negative for weakness.   Hematological:  Does not bruise/bleed easily.   All other systems reviewed and are negative.     Physical Exam     Initial Vitals [09/28/22 2008]   BP Pulse Resp Temp SpO2   109/66 104 20 97.9 °F (36.6 °C) 95 %      MAP       --          Physical Exam  Nursing Notes and Vital Signs Reviewed.  Constitutional: Patient is in no acute distress. Well-developed and well-nourished.  Head: Atraumatic. Normocephalic.  Eyes: PERRL. EOM intact. Conjunctivae are not pale. No scleral icterus.  ENT: Mucous membranes are moist. Oropharynx is clear and symmetric.    Neck: Supple. Full ROM. No lymphadenopathy.  Cardiovascular: Regular rate. Regular rhythm. No murmurs, rubs, or gallops. Distal pulses are 2+ and symmetric.  Pulmonary/Chest: No respiratory distress. Clear to auscultation bilaterally. No wheezing or  "rales.  Abdominal: Soft and non-distended.  There is no tenderness.  No rebound, guarding, or rigidity. Good bowel sounds.  Genitourinary: No CVA tenderness  Musculoskeletal: Moves all extremities. No obvious deformities. No edema. No calf tenderness.  Skin: Warm and dry.  Neurological:  Alert, awake, and appropriate.  Normal speech.  No acute focal neurological deficits are appreciated.  Psychiatric: Normal affect. Good eye contact. Appropriate in content.     ED Course   Procedures  ED Vital Signs:  Vitals:    09/28/22 2008 09/28/22 2128 09/28/22 2258 09/29/22 0115   BP: 109/66 106/61 116/64 118/68   Pulse: 104 94 90 87   Resp: 20   16   Temp: 97.9 °F (36.6 °C)      TempSrc: Oral      SpO2: 95%   98%   Weight: 102 kg (224 lb 13.9 oz)      Height: 5' 10" (1.778 m)          Abnormal Lab Results:  Labs Reviewed   CBC W/ AUTO DIFFERENTIAL - Abnormal; Notable for the following components:       Result Value    MCH 32.3 (*)     MCHC 36.2 (*)     Immature Granulocytes 0.7 (*)     Immature Grans (Abs) 0.06 (*)     All other components within normal limits   COMPREHENSIVE METABOLIC PANEL - Abnormal; Notable for the following components:    Sodium 131 (*)     CO2 22 (*)     Glucose 336 (*)     All other components within normal limits   TROPONIN I   B-TYPE NATRIURETIC PEPTIDE   TSH   TSH        All Lab Results:  Results for orders placed or performed during the hospital encounter of 09/28/22   CBC auto differential   Result Value Ref Range    WBC 9.05 3.90 - 12.70 K/uL    RBC 4.95 4.60 - 6.20 M/uL    Hemoglobin 16.0 14.0 - 18.0 g/dL    Hematocrit 44.2 40.0 - 54.0 %    MCV 89 82 - 98 fL    MCH 32.3 (H) 27.0 - 31.0 pg    MCHC 36.2 (H) 32.0 - 36.0 g/dL    RDW 12.5 11.5 - 14.5 %    Platelets 228 150 - 450 K/uL    MPV 10.2 9.2 - 12.9 fL    Immature Granulocytes 0.7 (H) 0.0 - 0.5 %    Gran # (ANC) 6.2 1.8 - 7.7 K/uL    Immature Grans (Abs) 0.06 (H) 0.00 - 0.04 K/uL    Lymph # 1.8 1.0 - 4.8 K/uL    Mono # 0.8 0.3 - 1.0 K/uL    Eos " # 0.1 0.0 - 0.5 K/uL    Baso # 0.11 0.00 - 0.20 K/uL    nRBC 0 0 /100 WBC    Gran % 68.6 38.0 - 73.0 %    Lymph % 19.4 18.0 - 48.0 %    Mono % 8.6 4.0 - 15.0 %    Eosinophil % 1.5 0.0 - 8.0 %    Basophil % 1.2 0.0 - 1.9 %    Differential Method Automated    Comprehensive metabolic panel   Result Value Ref Range    Sodium 131 (L) 136 - 145 mmol/L    Potassium 3.8 3.5 - 5.1 mmol/L    Chloride 99 95 - 110 mmol/L    CO2 22 (L) 23 - 29 mmol/L    Glucose 336 (H) 70 - 110 mg/dL    BUN 19 8 - 23 mg/dL    Creatinine 1.0 0.5 - 1.4 mg/dL    Calcium 9.3 8.7 - 10.5 mg/dL    Total Protein 7.5 6.0 - 8.4 g/dL    Albumin 3.9 3.5 - 5.2 g/dL    Total Bilirubin 0.7 0.1 - 1.0 mg/dL    Alkaline Phosphatase 74 55 - 135 U/L    AST 17 10 - 40 U/L    ALT 20 10 - 44 U/L    Anion Gap 10 8 - 16 mmol/L    eGFR >60 >60 mL/min/1.73 m^2   Troponin I #1   Result Value Ref Range    Troponin I <0.006 0.000 - 0.026 ng/mL   BNP   Result Value Ref Range    BNP 11 0 - 99 pg/mL   TSH   Result Value Ref Range    TSH 2.487 0.400 - 4.000 uIU/mL         Imaging Results:  Imaging Results              CTA Chest Aorta Non Coronary (Final result)  Result time 09/29/22 00:09:48      Final result by Marge Plaza MD (09/29/22 00:09:48)                   Impression:      Ectatic ascending aorta measuring up to 4.4 cm which is borderline aneurysmal and measured based on transaxial measurement.  Consider correlation to gated aortic study as clinically indicated recommend follow-up in 1 year interval.    No evidence for pulmonary emboli.  No aortic dissection.  No pneumonia.    Moderate paraseptal emphysema.  Recommend follow-up low radiation chest CT in 1 year.    All CT scans at this facility are performed  using dose modulation techniques as appropriate to performed exam including the following:  automated exposure control; adjustment of mA and/or kV according to the patients size (this includes techniques or standardized protocols for targeted exams where dose is  matched to indication/reason for exam: i.e. extremities or head);  iterative reconstruction technique.      Electronically signed by: Bola Bird  Date:    09/29/2022  Time:    00:09               Narrative:    EXAMINATION:  CTA CHEST AORTA NON CORONARY    CLINICAL HISTORY:  aortic aneurysm;    TECHNIQUE:  CTA chest.  One hundred mils of Omnipaque 350 was administered    COMPARISON:  None    FINDINGS:  Moderate paraseptal emphysema.  No evidence for aortic dissection or pulmonary emboli.  No evidence for aortic aneurysm.  The ascending aorta measures up to 4.4 cm, which is borderline aneurysmal or ectatic.  Cervical hardware noted.  Fatty infiltration of the liver.  No evidence for adenopathy.  Mild atherosclerotic changes.  No evidence for pulmonary emboli.  Ground-glass nodule in the right upper lobe measures 7 mm.                                       X-Ray Chest AP Portable (Final result)  Result time 09/28/22 20:53:15      Final result by Marge Plaza MD (09/28/22 20:53:15)                   Impression:      No acute abnormality.      Electronically signed by: Bola Bird  Date:    09/28/2022  Time:    20:53               Narrative:    EXAMINATION:  XR CHEST AP PORTABLE    CLINICAL HISTORY:  Chest Pain;    TECHNIQUE:  Single frontal view of the chest was performed.    COMPARISON:  None    FINDINGS:  Cervical hardware.The lungs are clear, with normal appearance of pulmonary vasculature and no pleural effusion or pneumothorax.    The cardiac silhouette is normal in size. The hilar and mediastinal contours are unremarkable.    Bones are intact.                                       The EKG was ordered, reviewed, and independently interpreted by the ED provider.  Interpretation time: 20:01  Rate: 103 BPM  Rhythm:  Sinus tachycardia with Premature atrial complexes  Interpretation: Sinus tachycardia with Premature atrial complexes  Right bundle branch block  Possible Inferior infarct ,age undetermined. No  STEMI.  When compared to EKG performed 17-APR-2020, Premature atrial complexes are now Present  Borderline criteria for Inferior infarct are now Present  T wave inversion now evident in Inferior leads           The Emergency Provider reviewed the vital signs and test results, which are outlined above.     ED Discussion       12:23 AM: Reassessed pt at this time. Discussed with pt all pertinent ED information and results. Discussed pt dx and plan of tx. Gave pt all f/u and return to the ED instructions. All questions and concerns were addressed at this time. Pt expresses understanding of information and instructions, and is comfortable with plan to discharge. Pt is stable for discharge.    I discussed with patient and/or family/caretaker that evaluation in the ED does not suggest any emergent or life threatening medical conditions requiring immediate intervention beyond what was provided in the ED, and I believe patient is safe for discharge.  Regardless, an unremarkable evaluation in the ED does not preclude the development or presence of a serious of life threatening condition. As such, patient was instructed to return immediately for any worsening or change in current symptoms.         Medical Decision Making:   Clinical Tests:   Lab Tests: Ordered and Reviewed  Radiological Study: Ordered and Reviewed  Medical Tests: Ordered and Reviewed         ED Medication(s):  Medications   iohexoL (OMNIPAQUE 350) injection 100 mL (100 mLs Intravenous Given 9/28/22 8853)       Discharge Medication List as of 9/29/2022 12:22 AM           Follow-up Information       Sj Escudero MD In 2 days.    Specialty: Internal Medicine  Contact information:  [address] [facility] [address]  [phone]               [facility] - Emergency Dept..    Specialty: Emergency Medicine  Why: As needed, If symptoms worsen  Contact information:  [address] [facility]  [address]  [phone]             Aneesh Read Ii, MD In 1 week.    Specialty: Cardiothoracic Surgery  Contact information:  [address] [facility]  [address]  [facility] [address]  [phone]                                 Scribe Attestation:   Scribe #1: I performed the above scribed service and the documentation accurately describes the services I performed. I attest to the accuracy of the note.     Attending:   Physician Attestation Statement for Scribe #1: I, Marie Velez MD, personally performed the services described in this documentation, as scribed by Robby Rodriguez, in my presence, and it is both accurate and complete.           Clinical Impression       ICD-10-CM ICD-9-CM   1. Weakness generalized  R53.1 780.79   2. Chest pain  R07.9 786.50   3. Insomnia, unspecified type  G47.00 780.52   4. Thoracic aortic aneurysm without rupture  I71.2 441.2       Disposition:   Disposition: Discharged  Condition: Stable       Marie Velez MD  09/29/22 2052

## 2022-09-29 NOTE — FIRST PROVIDER EVALUATION
"Medical screening examination initiated.  I have conducted a focused provider triage encounter, findings are as follows:    Brief history of present illness:  reports chest pain      Vitals:    09/28/22 2008   BP: 109/66   BP Location: Right arm   Patient Position: Sitting   Pulse: 104   Resp: 20   Temp: 97.9 °F (36.6 °C)   TempSrc: Oral   SpO2: 95%   Weight: 102 kg (224 lb 13.9 oz)   Height: 5' 10" (1.778 m)       Pertinent physical exam:  nad    Brief workup plan:  ekg, imaging, labs    Preliminary workup initiated; this workup will be continued and followed by the physician or advanced practice provider that is assigned to the patient when roomed.  "

## 2022-10-07 ENCOUNTER — TELEPHONE (OUTPATIENT)
Dept: GASTROENTEROLOGY | Facility: CLINIC | Age: 67
End: 2022-10-07
Payer: MEDICARE

## 2022-10-11 ENCOUNTER — TELEPHONE (OUTPATIENT)
Dept: TRANSPLANT | Facility: CLINIC | Age: 67
End: 2022-10-11
Payer: MEDICARE

## 2022-10-11 NOTE — LETTER
October 11, 2022    Chapo Jacobo  [address] [facility] [address]          Dear Chaporin Jacobo:  MRN: [id number]    Your lab work and imaging was due to be drawn on 10/10/22.  We contacted your lab and were unable to get test results.  It is very important to get your labs drawn and imaging done as scheduled.  We cannot monitor you for cancer, rejection, infections, or drug toxicity side effects without results.  Please call us at [phone] as soon as possible to let us know when you plan to have labs drawn.    Sincerely,    Shanice VILLATORON, RN        Your Liver Transplant Coordinator    [facility]  [address] [place] [address] [phone]

## 2022-10-11 NOTE — TELEPHONE ENCOUNTER
Letters sent out to patient as he cancelled his 10/10 imaging apt, txp physician apt and lab apt. Patient last labs were 6 months ago in April. Request of patient in letters and VM that he needs reach out to txp team to get all these apts rescheduled.

## 2022-10-11 NOTE — LETTER
October 11, 2022    Chapo Jacobo  [address] [facility] [address] [facility] [address]          Dear Chaporin FaganOdalis:  MRN: [id number]    According to our records, you have missed your follow-up appointment in the liver transplant department.  While we understand that unexpected events can occur, we encourage you to please cancel ahead of time, if at all possible.  This will allow us to fill that appointment time with another patient.    Please call [phone] and ask for a  in order to re-schedule your appointment.      Sincerely,    Shanice VILLATORON, RN        Your Liver Transplant Coordinator    [facility]  [address] [place] [address] [phone]

## 2022-11-21 ENCOUNTER — TELEPHONE (OUTPATIENT)
Dept: TRANSPLANT | Facility: CLINIC | Age: 67
End: 2022-11-21
Payer: MEDICARE

## 2022-11-29 ENCOUNTER — HOSPITAL ENCOUNTER (OUTPATIENT)
Dept: RADIOLOGY | Facility: HOSPITAL | Age: 67
Discharge: HOME | End: 2022-11-29
Attending: INTERNAL MEDICINE
Payer: MEDICARE

## 2022-11-29 DIAGNOSIS — K74.69 OTHER CIRRHOSIS OF LIVER: ICD-10-CM

## 2022-11-29 PROCEDURE — 76705 ECHO EXAM OF ABDOMEN: CPT | Mod: 26,,, | Performed by: RADIOLOGY

## 2022-11-29 PROCEDURE — 76705 ECHO EXAM OF ABDOMEN: CPT | Mod: TC

## 2022-11-29 PROCEDURE — 76705 US ABDOMEN LIMITED: ICD-10-PCS | Mod: 26,,, | Performed by: RADIOLOGY

## 2022-11-30 ENCOUNTER — LAB VISIT (OUTPATIENT)
Dept: LAB | Facility: HOSPITAL | Age: 67
End: 2022-11-30
Attending: INTERNAL MEDICINE
Payer: MEDICARE

## 2022-11-30 DIAGNOSIS — Z94.4 LIVER TRANSPLANTED: ICD-10-CM

## 2022-11-30 DIAGNOSIS — D84.9 IMMUNOSUPPRESSION: ICD-10-CM

## 2022-11-30 DIAGNOSIS — K74.69 OTHER CIRRHOSIS OF LIVER: ICD-10-CM

## 2022-11-30 LAB
AFP SERPL-MCNC: 2.2 NG/ML (ref 0–8.4)
ALBUMIN SERPL BCP-MCNC: 3.7 G/DL (ref 3.5–5.2)
ALP SERPL-CCNC: 80 U/L (ref 55–135)
ALT SERPL W/O P-5'-P-CCNC: 18 U/L (ref 10–44)
ANION GAP SERPL CALC-SCNC: 13 MMOL/L (ref 8–16)
AST SERPL-CCNC: 22 U/L (ref 10–40)
BASOPHILS # BLD AUTO: 0.09 K/UL (ref 0–0.2)
BASOPHILS NFR BLD: 1.4 % (ref 0–1.9)
BILIRUB SERPL-MCNC: 0.4 MG/DL (ref 0.1–1)
BUN SERPL-MCNC: 12 MG/DL (ref 8–23)
CALCIUM SERPL-MCNC: 8.8 MG/DL (ref 8.7–10.5)
CHLORIDE SERPL-SCNC: 97 MMOL/L (ref 95–110)
CO2 SERPL-SCNC: 23 MMOL/L (ref 23–29)
CREAT SERPL-MCNC: 1.1 MG/DL (ref 0.5–1.4)
DIFFERENTIAL METHOD: ABNORMAL
EOSINOPHIL # BLD AUTO: 0.2 K/UL (ref 0–0.5)
EOSINOPHIL NFR BLD: 3.5 % (ref 0–8)
ERYTHROCYTE [DISTWIDTH] IN BLOOD BY AUTOMATED COUNT: 13 % (ref 11.5–14.5)
EST. GFR  (NO RACE VARIABLE): >60 ML/MIN/1.73 M^2
GLUCOSE SERPL-MCNC: 395 MG/DL (ref 70–110)
HCT VFR BLD AUTO: 46.4 % (ref 40–54)
HGB BLD-MCNC: 16.3 G/DL (ref 14–18)
IMM GRANULOCYTES # BLD AUTO: 0.04 K/UL (ref 0–0.04)
IMM GRANULOCYTES NFR BLD AUTO: 0.6 % (ref 0–0.5)
INR PPP: 1 (ref 0.8–1.2)
LYMPHOCYTES # BLD AUTO: 1.6 K/UL (ref 1–4.8)
LYMPHOCYTES NFR BLD: 24.5 % (ref 18–48)
MCH RBC QN AUTO: 32.3 PG (ref 27–31)
MCHC RBC AUTO-ENTMCNC: 35.1 G/DL (ref 32–36)
MCV RBC AUTO: 92 FL (ref 82–98)
MONOCYTES # BLD AUTO: 0.5 K/UL (ref 0.3–1)
MONOCYTES NFR BLD: 8.5 % (ref 4–15)
NEUTROPHILS # BLD AUTO: 3.9 K/UL (ref 1.8–7.7)
NEUTROPHILS NFR BLD: 61.5 % (ref 38–73)
NRBC BLD-RTO: 0 /100 WBC
PLATELET # BLD AUTO: 179 K/UL (ref 150–450)
PMV BLD AUTO: 11.8 FL (ref 9.2–12.9)
POTASSIUM SERPL-SCNC: 4.2 MMOL/L (ref 3.5–5.1)
PROT SERPL-MCNC: 7.5 G/DL (ref 6–8.4)
PROTHROMBIN TIME: 10.4 SEC (ref 9–12.5)
RBC # BLD AUTO: 5.05 M/UL (ref 4.6–6.2)
SODIUM SERPL-SCNC: 133 MMOL/L (ref 136–145)
WBC # BLD AUTO: 6.33 K/UL (ref 3.9–12.7)

## 2022-11-30 PROCEDURE — 80053 COMPREHEN METABOLIC PANEL: CPT | Performed by: INTERNAL MEDICINE

## 2022-11-30 PROCEDURE — 85610 PROTHROMBIN TIME: CPT | Performed by: INTERNAL MEDICINE

## 2022-11-30 PROCEDURE — 82105 ALPHA-FETOPROTEIN SERUM: CPT | Performed by: INTERNAL MEDICINE

## 2022-11-30 PROCEDURE — 80197 ASSAY OF TACROLIMUS: CPT | Performed by: INTERNAL MEDICINE

## 2022-11-30 PROCEDURE — 36415 COLL VENOUS BLD VENIPUNCTURE: CPT | Mod: PO | Performed by: INTERNAL MEDICINE

## 2022-11-30 PROCEDURE — 85025 COMPLETE CBC W/AUTO DIFF WBC: CPT | Performed by: INTERNAL MEDICINE

## 2022-12-01 LAB — TACROLIMUS BLD-MCNC: 4.5 NG/ML (ref 5–15)

## 2022-12-05 ENCOUNTER — TELEPHONE (OUTPATIENT)
Dept: TRANSPLANT | Facility: CLINIC | Age: 67
End: 2022-12-05
Payer: MEDICARE

## 2022-12-05 NOTE — TELEPHONE ENCOUNTER
Patient made aware that his liver ultrasound and liver txp labs have been reviewed, no action required. Next Img due June 2023, next labs due  March 2023.        ----- Message from Viviana Reyes MD sent at 12/2/2022  4:57 PM CST -----  Reviewed, nothing to do; repeat per routine      Viviana Reyes MD  [facility]  Reviewed, nothing to do; repeat per routine

## 2022-12-05 NOTE — LETTER
December 5, 2022    Chaporin Jacobo  [address] [facility] [address]          Dear Chapo Jacobo:  MRN: [id number]    This is a follow up to your recent labs and ultrasound, your lab results and ultrasound were stable.  There are no medicine changes.  Please have your labs drawn again on 3/13/203.      You are overdue to be seen in the transplant clinic. Transplant patient are required to be seen annually as per protocol and so that physicians may safely continue refilling meds. Please contact the number below and speak with Mrs Springer to schedule your overdue visit.    If you cannot have your labs drawn on the scheduled date, it is your responsibility to call the transplant department to reschedule.  Please call [phone] and ask to speak to Racheal FLOWER -  for all scheduling requests.     Sincerely,  Shanice VILLATORON, RN        Your Liver Transplant Coordinator    [facility]  [address] [place] [address]  [phone]

## 2023-02-06 ENCOUNTER — TELEPHONE (OUTPATIENT)
Dept: HEPATOLOGY | Facility: CLINIC | Age: 68
End: 2023-02-06
Payer: MEDICARE

## 2023-02-06 NOTE — TELEPHONE ENCOUNTER
Attempted to return call to patient x2 at [phone] with no answer. The phone appears to be answered, the line disconnects. Will attempt to contact patient again later this afternoon.

## 2023-03-16 ENCOUNTER — TELEPHONE (OUTPATIENT)
Dept: TRANSPLANT | Facility: CLINIC | Age: 68
End: 2023-03-16

## 2023-03-16 NOTE — TELEPHONE ENCOUNTER
Patient did not show for labs, missed lab letter sent.   Remind me card placed for 3/27     spoke with patient earlier today who told her he was without insurance and also just not feeling well and will contact team when he can reschedule labs.

## 2023-03-16 NOTE — LETTER
March 16, 2023    Chapo Jacobo  [address] [facility] [address]          Dear Chapo Jacobo:  MRN: [id number]    Your lab work was due to be drawn on 3/13/2023.  We contacted your lab and were unable to get test results.  It is very important to get your labs drawn as scheduled.  We cannot monitor you for rejection, infections, or drug toxicity side effects without lab results.  Please call us at [phone] as soon as possible to let us know when you plan to have labs drawn.    Sincerely,    Shanice VILLATORON, RN        Your Liver Transplant Coordinator    [facility]  [address] [place] [address] [phone]

## 2023-03-27 ENCOUNTER — TELEPHONE (OUTPATIENT)
Dept: TRANSPLANT | Facility: CLINIC | Age: 68
End: 2023-03-27

## 2023-03-27 DIAGNOSIS — M79.2 NERVE PAIN: ICD-10-CM

## 2023-03-27 DIAGNOSIS — L60.2 NAIL OVERGROWTH: ICD-10-CM

## 2023-03-27 DIAGNOSIS — F43.21 GRIEVING: ICD-10-CM

## 2023-03-27 DIAGNOSIS — Z94.4 LIVER REPLACED BY TRANSPLANT: Primary | ICD-10-CM

## 2023-03-27 NOTE — TELEPHONE ENCOUNTER
Writer reached back out to patient, patient states his insurance kicks in on 4/1, labs planned for 4/4.

## 2023-03-28 ENCOUNTER — TELEPHONE (OUTPATIENT)
Dept: TRANSPLANT | Facility: CLINIC | Age: 68
End: 2023-03-28
Payer: MEDICARE

## 2023-03-28 ENCOUNTER — TELEPHONE (OUTPATIENT)
Dept: NEUROLOGY | Facility: CLINIC | Age: 68
End: 2023-03-28
Payer: MEDICARE

## 2023-03-28 ENCOUNTER — TELEPHONE (OUTPATIENT)
Dept: PSYCHIATRY | Facility: CLINIC | Age: 68
End: 2023-03-28
Payer: MEDICARE

## 2023-03-28 DIAGNOSIS — R53.83 FATIGUE, UNSPECIFIED TYPE: ICD-10-CM

## 2023-03-28 DIAGNOSIS — Z94.4 LIVER REPLACED BY TRANSPLANT: Primary | ICD-10-CM

## 2023-03-28 DIAGNOSIS — K74.60 HEPATIC CIRRHOSIS, UNSPECIFIED HEPATIC CIRRHOSIS TYPE, UNSPECIFIED WHETHER ASCITES PRESENT: ICD-10-CM

## 2023-03-28 NOTE — TELEPHONE ENCOUNTER
----- Message from Racheal Unger MA sent at 3/27/2023  4:20 PM CDT -----  Hello and Good Afternoon,    The above patient needs an appointment with one of your providers due to sadness / death in family.  His nurse has a referral in epic.  Anything after two weeks would be appreciated.      I appreciate your assistance with his appointment.    Thank you,    Racheal FLOWER [phone]

## 2023-03-28 NOTE — TELEPHONE ENCOUNTER
----- Message from Maria T Mcgarry NP sent at 3/28/2023  8:30 AM CDT -----  ok  ----- Message -----  From: Shashi Vinson MA  Sent: 3/27/2023   4:29 PM CDT  To: Maria T Mcgarry NP    Are you ok with seeing this ?   ----- Message -----  From: Racheal Unger MA  Sent: 3/27/2023   4:20 PM CDT  To: On [facility] Clinical Support    Hello and Good Afternoon,    The above patient needs an appointment with one of the providers due to nerve pain BLE.  His nurse has a referral in Epic.  Something after two weeks is best for him.    I appreciate your assistance with his appointment.    Thank you,    Racheal FLOWER [phone]

## 2023-03-28 NOTE — TELEPHONE ENCOUNTER
Writer has linked Hep C lab to patients upcoming lab apt next week.        ----- Message from Viviana Reyes MD sent at 3/27/2023  7:13 PM CDT -----  Regarding: RE: Hep C  Yes that's fine    ----- Message -----  From: Shanice Le RN  Sent: 3/27/2023  12:57 PM CDT  To: Viviana Reyes MD  Subject: Hep C                                            Patient asking if we would check his Hep C, ok to link?

## 2023-04-04 ENCOUNTER — LAB VISIT (OUTPATIENT)
Dept: LAB | Facility: HOSPITAL | Age: 68
End: 2023-04-04
Attending: INTERNAL MEDICINE
Payer: MEDICARE

## 2023-04-04 DIAGNOSIS — R53.83 FATIGUE, UNSPECIFIED TYPE: ICD-10-CM

## 2023-04-04 DIAGNOSIS — C22.0 HCC (HEPATOCELLULAR CARCINOMA): ICD-10-CM

## 2023-04-04 DIAGNOSIS — Z94.4 LIVER REPLACED BY TRANSPLANT: ICD-10-CM

## 2023-04-04 DIAGNOSIS — K74.60 HEPATIC CIRRHOSIS, UNSPECIFIED HEPATIC CIRRHOSIS TYPE, UNSPECIFIED WHETHER ASCITES PRESENT: ICD-10-CM

## 2023-04-04 LAB
AFP SERPL-MCNC: 2.1 NG/ML (ref 0–8.4)
ALBUMIN SERPL BCP-MCNC: 3.7 G/DL (ref 3.5–5.2)
ALP SERPL-CCNC: 61 U/L (ref 55–135)
ALT SERPL W/O P-5'-P-CCNC: 15 U/L (ref 10–44)
ANION GAP SERPL CALC-SCNC: 6 MMOL/L (ref 8–16)
AST SERPL-CCNC: 16 U/L (ref 10–40)
BASOPHILS # BLD AUTO: 0.08 K/UL (ref 0–0.2)
BASOPHILS NFR BLD: 1.3 % (ref 0–1.9)
BILIRUB SERPL-MCNC: 0.4 MG/DL (ref 0.1–1)
BUN SERPL-MCNC: 8 MG/DL (ref 8–23)
CALCIUM SERPL-MCNC: 8.9 MG/DL (ref 8.7–10.5)
CHLORIDE SERPL-SCNC: 103 MMOL/L (ref 95–110)
CO2 SERPL-SCNC: 27 MMOL/L (ref 23–29)
CREAT SERPL-MCNC: 0.9 MG/DL (ref 0.5–1.4)
DIFFERENTIAL METHOD: ABNORMAL
EOSINOPHIL # BLD AUTO: 0.2 K/UL (ref 0–0.5)
EOSINOPHIL NFR BLD: 3.3 % (ref 0–8)
ERYTHROCYTE [DISTWIDTH] IN BLOOD BY AUTOMATED COUNT: 13.4 % (ref 11.5–14.5)
EST. GFR  (NO RACE VARIABLE): >60 ML/MIN/1.73 M^2
GLUCOSE SERPL-MCNC: 165 MG/DL (ref 70–110)
HCT VFR BLD AUTO: 46.9 % (ref 40–54)
HGB BLD-MCNC: 15.9 G/DL (ref 14–18)
IMM GRANULOCYTES # BLD AUTO: 0.03 K/UL (ref 0–0.04)
IMM GRANULOCYTES NFR BLD AUTO: 0.5 % (ref 0–0.5)
INR PPP: 1.1 (ref 0.8–1.2)
LYMPHOCYTES # BLD AUTO: 1.7 K/UL (ref 1–4.8)
LYMPHOCYTES NFR BLD: 27.5 % (ref 18–48)
MCH RBC QN AUTO: 32 PG (ref 27–31)
MCHC RBC AUTO-ENTMCNC: 33.9 G/DL (ref 32–36)
MCV RBC AUTO: 94 FL (ref 82–98)
MONOCYTES # BLD AUTO: 0.5 K/UL (ref 0.3–1)
MONOCYTES NFR BLD: 7.7 % (ref 4–15)
NEUTROPHILS # BLD AUTO: 3.6 K/UL (ref 1.8–7.7)
NEUTROPHILS NFR BLD: 59.7 % (ref 38–73)
NRBC BLD-RTO: 0 /100 WBC
PLATELET # BLD AUTO: 189 K/UL (ref 150–450)
PMV BLD AUTO: 12.4 FL (ref 9.2–12.9)
POTASSIUM SERPL-SCNC: 4.2 MMOL/L (ref 3.5–5.1)
PROT SERPL-MCNC: 6.8 G/DL (ref 6–8.4)
PROTHROMBIN TIME: 11.3 SEC (ref 9–12.5)
RBC # BLD AUTO: 4.97 M/UL (ref 4.6–6.2)
SODIUM SERPL-SCNC: 136 MMOL/L (ref 136–145)
WBC # BLD AUTO: 6.1 K/UL (ref 3.9–12.7)

## 2023-04-04 PROCEDURE — 87522 HEPATITIS C REVRS TRNSCRPJ: CPT | Performed by: INTERNAL MEDICINE

## 2023-04-04 PROCEDURE — 80053 COMPREHEN METABOLIC PANEL: CPT | Performed by: INTERNAL MEDICINE

## 2023-04-04 PROCEDURE — 80197 ASSAY OF TACROLIMUS: CPT | Performed by: INTERNAL MEDICINE

## 2023-04-04 PROCEDURE — 85610 PROTHROMBIN TIME: CPT | Performed by: INTERNAL MEDICINE

## 2023-04-04 PROCEDURE — 36415 COLL VENOUS BLD VENIPUNCTURE: CPT | Mod: PO | Performed by: INTERNAL MEDICINE

## 2023-04-04 PROCEDURE — 82105 ALPHA-FETOPROTEIN SERUM: CPT | Performed by: INTERNAL MEDICINE

## 2023-04-04 PROCEDURE — 85025 COMPLETE CBC W/AUTO DIFF WBC: CPT | Performed by: INTERNAL MEDICINE

## 2023-04-05 LAB
HCV RNA SERPL QL NAA+PROBE: NOT DETECTED
HCV RNA SPEC NAA+PROBE-ACNC: <12 IU/ML
TACROLIMUS BLD-MCNC: 5.9 NG/ML (ref 5–15)

## 2023-04-06 ENCOUNTER — TELEPHONE (OUTPATIENT)
Dept: PODIATRY | Facility: CLINIC | Age: 68
End: 2023-04-06
Payer: MEDICAID

## 2023-04-06 ENCOUNTER — TELEPHONE (OUTPATIENT)
Dept: TRANSPLANT | Facility: CLINIC | Age: 68
End: 2023-04-06
Payer: MEDICAID

## 2023-04-06 DIAGNOSIS — Z94.4 LIVER REPLACED BY TRANSPLANT: Primary | ICD-10-CM

## 2023-04-06 NOTE — TELEPHONE ENCOUNTER
Lvm requesting callback    ----- Message from Petra Patel sent at 4/6/2023  4:42 PM CDT -----  Contact: [phone]  Patient requesting a call back in regards to questions about his upcoming appointment on Monday 04/10. Please call back at [phone]. Thanks KD

## 2023-04-06 NOTE — LETTER
April 6, 2023    Chapo Jacobo  [address] [facility] [address]          Dear Chapo Jacobo:  MRN: [id number]    This is a follow up to your recent labs, your lab results were stable.  There are no medicine changes.  Please have your labs drawn again on 6/26/2023.      If you cannot have your labs drawn on the scheduled date, it is your responsibility to call the transplant department to reschedule.  Please call [phone] and ask to speak to Racheal FLOWER -  for all scheduling requests.     Sincerely,    Shanice VILLATORON, RN        Your Liver Transplant Coordinator    [facility]  [address] [place] [id number]  [phone]

## 2023-04-06 NOTE — TELEPHONE ENCOUNTER
Letter sent to patient stating: Your labs have been reviewed by your Transplant physician, no action required. Next labs due 6/26/2023            ----- Message from Viviana Reyes MD sent at 4/5/2023  7:25 PM CDT -----  Reviewed, nothing to do; repeat per routine

## 2023-04-10 ENCOUNTER — OFFICE VISIT (OUTPATIENT)
Dept: PODIATRY | Facility: CLINIC | Age: 68
End: 2023-04-10
Payer: MEDICARE

## 2023-04-10 VITALS — HEIGHT: 70 IN | BODY MASS INDEX: 32.07 KG/M2 | WEIGHT: 224 LBS

## 2023-04-10 DIAGNOSIS — Z94.4 LIVER REPLACED BY TRANSPLANT: ICD-10-CM

## 2023-04-10 DIAGNOSIS — M79.2 NERVE PAIN: ICD-10-CM

## 2023-04-10 DIAGNOSIS — D84.9 IMMUNOSUPPRESSION: ICD-10-CM

## 2023-04-10 DIAGNOSIS — L60.2 NAIL OVERGROWTH: ICD-10-CM

## 2023-04-10 DIAGNOSIS — E11.49 TYPE 2 DIABETES MELLITUS WITH OTHER NEUROLOGIC COMPLICATION, WITHOUT LONG-TERM CURRENT USE OF INSULIN: Primary | ICD-10-CM

## 2023-04-10 PROCEDURE — 99203 OFFICE O/P NEW LOW 30 MIN: CPT | Mod: 25,S$GLB,, | Performed by: PODIATRIST

## 2023-04-10 PROCEDURE — 1159F MED LIST DOCD IN RCRD: CPT | Mod: CPTII,S$GLB,, | Performed by: PODIATRIST

## 2023-04-10 PROCEDURE — 1101F PR PT FALLS ASSESS DOC 0-1 FALLS W/OUT INJ PAST YR: ICD-10-PCS | Mod: CPTII,S$GLB,, | Performed by: PODIATRIST

## 2023-04-10 PROCEDURE — 1101F PT FALLS ASSESS-DOCD LE1/YR: CPT | Mod: CPTII,S$GLB,, | Performed by: PODIATRIST

## 2023-04-10 PROCEDURE — 1126F AMNT PAIN NOTED NONE PRSNT: CPT | Mod: CPTII,S$GLB,, | Performed by: PODIATRIST

## 2023-04-10 PROCEDURE — 1160F RVW MEDS BY RX/DR IN RCRD: CPT | Mod: CPTII,S$GLB,, | Performed by: PODIATRIST

## 2023-04-10 PROCEDURE — 99203 PR OFFICE/OUTPT VISIT, NEW, LEVL III, 30-44 MIN: ICD-10-PCS | Mod: 25,S$GLB,, | Performed by: PODIATRIST

## 2023-04-10 PROCEDURE — 99999 PR PBB SHADOW E&M-EST. PATIENT-LVL IV: CPT | Mod: PBBFAC,,, | Performed by: PODIATRIST

## 2023-04-10 PROCEDURE — 11721 DEBRIDE NAIL 6 OR MORE: CPT | Mod: Q9,S$GLB,, | Performed by: PODIATRIST

## 2023-04-10 PROCEDURE — 3288F PR FALLS RISK ASSESSMENT DOCUMENTED: ICD-10-PCS | Mod: CPTII,S$GLB,, | Performed by: PODIATRIST

## 2023-04-10 PROCEDURE — 1160F PR REVIEW ALL MEDS BY PRESCRIBER/CLIN PHARMACIST DOCUMENTED: ICD-10-PCS | Mod: CPTII,S$GLB,, | Performed by: PODIATRIST

## 2023-04-10 PROCEDURE — 11721 PR DEBRIDEMENT OF NAILS, 6 OR MORE: ICD-10-PCS | Mod: Q9,S$GLB,, | Performed by: PODIATRIST

## 2023-04-10 PROCEDURE — 3288F FALL RISK ASSESSMENT DOCD: CPT | Mod: CPTII,S$GLB,, | Performed by: PODIATRIST

## 2023-04-10 PROCEDURE — 1126F PR PAIN SEVERITY QUANTIFIED, NO PAIN PRESENT: ICD-10-PCS | Mod: CPTII,S$GLB,, | Performed by: PODIATRIST

## 2023-04-10 PROCEDURE — 99999 PR PBB SHADOW E&M-EST. PATIENT-LVL IV: ICD-10-PCS | Mod: PBBFAC,,, | Performed by: PODIATRIST

## 2023-04-10 PROCEDURE — 1159F PR MEDICATION LIST DOCUMENTED IN MEDICAL RECORD: ICD-10-PCS | Mod: CPTII,S$GLB,, | Performed by: PODIATRIST

## 2023-04-10 PROCEDURE — 3008F PR BODY MASS INDEX (BMI) DOCUMENTED: ICD-10-PCS | Mod: CPTII,S$GLB,, | Performed by: PODIATRIST

## 2023-04-10 PROCEDURE — 3008F BODY MASS INDEX DOCD: CPT | Mod: CPTII,S$GLB,, | Performed by: PODIATRIST

## 2023-04-10 NOTE — PROGRESS NOTES
Subjective:       Patient ID: Chapo Jacobo is a 67 y.o. male.    Chief Complaint: Nail Problem (C/o thick fungal toenails, pt would like toenails clipped today, pt has tingling and burning in both feet, diabetic, wears casual shoes and socks, last seen PCP Dr. Escudero 03/2023)      HPI: Patient presents to the office today with the chief complaint of elongated, thickened and dystrophic nail plates to the B/L foot.  This patient is a Diabetic Type II, complicated with Peripheral Neuropathy and history of liver transplant on long-term immunosuppressive medication.. Patient does follow with Primary Care and/or Endocrinology for management of Diabetes Mellitus. This patient's PMD is Sj Escudero MD. This patient last saw his/her primary care provider on 03/22/2023.     Hemoglobin A1C   Date Value Ref Range Status   11/01/2016 5.7 4.5 - 6.2 % Final     Comment:     According to ADA guidelines, hemoglobin A1C <7.0% represents  optimal control in non-pregnant diabetic patients.  Different  metrics may apply to specific populations.   Standards of Medical Care in Diabetes - 2016.  For the purpose of screening for the presence of diabetes:  <5.7%     Consistent with the absence of diabetes  5.7-6.4%  Consistent with increasing risk for diabetes   (prediabetes)  >or=6.5%  Consistent with diabetes  Currently no consensus exists for use of hemoglobin A1C  for diagnosis of diabetes for children.     02/16/2006 6.3 (H) 4.5 - 6.2 % Final   .     Review of patient's allergies indicates:   Allergen Reactions    Darvocet a500 [propoxyphene n-acetaminophen]     Paroxetine hcl Other (See Comments)     Other reaction(s): Unknown  Other reaction(s): Unknown  paranoid    Penicillins Other (See Comments)     Other reaction(s): Anaphylaxis  Other reaction(s): Anaphylaxis  As child    Codeine Itching     Other reaction(s): Unknown  Other reaction(s): Unknown       Past Medical History:   Diagnosis Date    Anxiety      "Appendicitis     Depression     Diabetes mellitus     Encounter for blood transfusion     Gallbladder & bile duct stone with obstruction     Herpes     Sleep apnea        Family History   Problem Relation Age of Onset    Arthritis Mother     Melanoma Father     Cancer Father         melanoma    Fibromyalgia Sister     Cancer Maternal Grandfather         unknown type       Social History     Socioeconomic History    Marital status: Single   Tobacco Use    Smoking status: Every Day     Packs/day: 1.00     Years: 46.00     Pack years: 46.00     Types: Cigarettes    Smokeless tobacco: Never   Substance and Sexual Activity    Alcohol use: Yes     Alcohol/week: 2.0 standard drinks     Types: 2 Cans of beer per week    Drug use: No    Sexual activity: Yes     Partners: Female       Past Surgical History:   Procedure Laterality Date    ABDOMINAL SURGERY      APPENDECTOMY      bilateral rotator cuff surgery      BRAIN SURGERY      CERVICAL FUSION      CHOLECYSTECTOMY      COLONOSCOPY N/A 7/10/2017    Procedure: COLONOSCOPY;  Surgeon: Viviana Reyes MD;  Location: [facility];  Service: Endoscopy;  Laterality: N/A;    LIVER TRANSPLANT      LUMBAR DISC SURGERY      TONSILLECTOMY         Review of Systems       Objective:   Ht 5' 10" (1.778 m)   Wt 101.6 kg (224 lb)   BMI 32.14 kg/m²     Physical Exam  LOWER EXTREMITY PHYSICAL EXAMINATION    VASCULAR:  The right dorsalis pedis pulse 2/4 and the right posterior tibial pulse 2/4.  The left dorsalis pedis pulse 2/4 and posterior tibial pulse on the left is 2/4.  Capillary refill is intact.  Pedal hair growth intact    NEUROLOGY: Protective sensation is not intact to the left and right plantar surfaces of the foot and digits, as the patient has no sensation/detection at greater than 4 distinct points of contact with 5.07 Benton Chucho monofilament. Sensation to light touch is intact on the left and right foot. Proprioception is intact, bilateral. Sensation to pin prick is reduced " to absent. Vibratory sensation is diminished.    DERMATOLOGY:  Skin is supple, moist, intact.  There is no signs of callusing, ulcerations, other lesions identified to the dorsal or plantar aspect of the right or left foot.  The R1, 2, 5 and left L1,2, 5 are thickened, discolored dystrophic.  There is subungual debris.  Nail plates have area of dark discoloration.  The remaining nails 3-4 on the right foot and the left foot are elongated but of normal color, thickness, and texture.   There is no signs of ingrowing into the medial or lateral borders.  There is no evidence of wounds or skin breakdown.  No edema or erythema.  No obvious lacerations or fissuring.  Interdigital spaces are clean, dry, intact.  No rashes or scars appreciated.    ORTHOPEDIC: Manual Muscle Testing is 5/5 in all planes on the left and right, without pains, with and without resistance. Gait pattern is non-antalgic.    Assessment:     1. Type 2 diabetes mellitus with other neurologic complication, without long-term current use of insulin    2. Immunosuppression    3. Liver replaced by transplant    4. Nail overgrowth    5. Nerve pain        Plan:     Type 2 diabetes mellitus with other neurologic complication, without long-term current use of insulin    Immunosuppression    Liver replaced by transplant  -     Ambulatory referral/consult to Podiatry    Nail overgrowth  -     Ambulatory referral/consult to Podiatry    Nerve pain  -     Ambulatory referral/consult to Podiatry        Thorough discussion is had with the patient this afternoon, concerning the diagnosis, its etiology, and the treatment algorithm at present.  Greater than 50% of this visit spent on counseling and coordination of care. Greater than 15 minutes of a 20 minute appointment spent on education about the diabetic foot, neuropathy, and prevention of limb loss.  Shoe inspection. Diabetic Foot Education. Patient reminded of the importance of good nutrition and blood sugar control  to help prevent podiatric complications of diabetes. Patient instructed on proper foot hygeine. We discussed wearing proper and supportive shoe gear, daily foot inspections, never walking barefooted or sock footed, never putting sharp instruments to feet which can cause major complications associated with infection, ulcers, lacerations.      Dystrophic nail plates, as outlined above (R#1,2,5  ; L#1,2,5 ), are sharply debrided with double action nail nipper, and/or with the assistance of a mechanical rotary jyoti, with removal of all offending nail and nail border(s), for reduction of pains. Nails are reduced in terms of length, width and girth with removal of subungual debris to facilitate pain free weight bearing and ambulation. The elongated nails as outlined in the objective portion of this note, were trimmed to appropriate length, with a double action nail nipper, for alleviation/reduction of pains as well. Follow up in approx. 3-4 months.    Continue management for long-term immunosuppressive therapy per transplant team.    Defer diabetic pain medication to transplant team      Future Appointments   Date Time Provider Department Center   7/3/2023 10:00 AM Maria T Mcgarry NP [facility]   7/7/2023  4:00 PM Scott Moncada MD [facility]

## 2023-04-11 DIAGNOSIS — Z94.4 LIVER REPLACED BY TRANSPLANT: Primary | ICD-10-CM

## 2023-04-12 DIAGNOSIS — K74.60 HEPATIC CIRRHOSIS, UNSPECIFIED HEPATIC CIRRHOSIS TYPE, UNSPECIFIED WHETHER ASCITES PRESENT: ICD-10-CM

## 2023-04-12 DIAGNOSIS — Z94.4 LIVER REPLACED BY TRANSPLANT: Primary | ICD-10-CM

## 2023-06-12 DIAGNOSIS — Z94.4 LIVER TRANSPLANTED: ICD-10-CM

## 2023-06-12 DIAGNOSIS — D84.9 IMMUNOSUPPRESSION: ICD-10-CM

## 2023-06-12 RX ORDER — TACROLIMUS 1 MG/1
2 CAPSULE ORAL EVERY 12 HOURS
Qty: 360 CAPSULE | Refills: 3 | Status: SHIPPED | OUTPATIENT
Start: 2023-06-12 | End: 2024-06-12

## 2023-06-12 RX ORDER — TACROLIMUS 1 MG/1
2 CAPSULE ORAL EVERY 12 HOURS
Qty: 120 CAPSULE | Refills: 0 | Status: SHIPPED | OUTPATIENT
Start: 2023-06-12 | End: 2023-06-12 | Stop reason: SDUPTHER

## 2023-06-28 ENCOUNTER — LAB VISIT (OUTPATIENT)
Dept: LAB | Facility: HOSPITAL | Age: 68
End: 2023-06-28
Attending: INTERNAL MEDICINE
Payer: MEDICARE

## 2023-06-28 DIAGNOSIS — C22.0 HCC (HEPATOCELLULAR CARCINOMA): ICD-10-CM

## 2023-06-28 DIAGNOSIS — Z94.4 LIVER REPLACED BY TRANSPLANT: ICD-10-CM

## 2023-06-28 DIAGNOSIS — K74.60 HEPATIC CIRRHOSIS, UNSPECIFIED HEPATIC CIRRHOSIS TYPE, UNSPECIFIED WHETHER ASCITES PRESENT: ICD-10-CM

## 2023-06-28 LAB
AFP SERPL-MCNC: 2.3 NG/ML (ref 0–8.4)
ALBUMIN SERPL BCP-MCNC: 4.2 G/DL (ref 3.5–5.2)
ALP SERPL-CCNC: 70 U/L (ref 55–135)
ALT SERPL W/O P-5'-P-CCNC: 18 U/L (ref 10–44)
ANION GAP SERPL CALC-SCNC: 11 MMOL/L (ref 8–16)
AST SERPL-CCNC: 21 U/L (ref 10–40)
BASOPHILS # BLD AUTO: 0.12 K/UL (ref 0–0.2)
BASOPHILS NFR BLD: 1.5 % (ref 0–1.9)
BILIRUB SERPL-MCNC: 0.9 MG/DL (ref 0.1–1)
BUN SERPL-MCNC: 12 MG/DL (ref 8–23)
CALCIUM SERPL-MCNC: 9.6 MG/DL (ref 8.7–10.5)
CHLORIDE SERPL-SCNC: 100 MMOL/L (ref 95–110)
CO2 SERPL-SCNC: 26 MMOL/L (ref 23–29)
CREAT SERPL-MCNC: 1.1 MG/DL (ref 0.5–1.4)
DIFFERENTIAL METHOD: ABNORMAL
EOSINOPHIL # BLD AUTO: 0.3 K/UL (ref 0–0.5)
EOSINOPHIL NFR BLD: 3.2 % (ref 0–8)
ERYTHROCYTE [DISTWIDTH] IN BLOOD BY AUTOMATED COUNT: 13.2 % (ref 11.5–14.5)
EST. GFR  (NO RACE VARIABLE): >60 ML/MIN/1.73 M^2
GLUCOSE SERPL-MCNC: 139 MG/DL (ref 70–110)
HCT VFR BLD AUTO: 51.3 % (ref 40–54)
HGB BLD-MCNC: 17.2 G/DL (ref 14–18)
IMM GRANULOCYTES # BLD AUTO: 0.06 K/UL (ref 0–0.04)
IMM GRANULOCYTES NFR BLD AUTO: 0.8 % (ref 0–0.5)
INR PPP: 1 (ref 0.8–1.2)
LYMPHOCYTES # BLD AUTO: 2.6 K/UL (ref 1–4.8)
LYMPHOCYTES NFR BLD: 32.7 % (ref 18–48)
MCH RBC QN AUTO: 32.5 PG (ref 27–31)
MCHC RBC AUTO-ENTMCNC: 33.5 G/DL (ref 32–36)
MCV RBC AUTO: 97 FL (ref 82–98)
MONOCYTES # BLD AUTO: 0.9 K/UL (ref 0.3–1)
MONOCYTES NFR BLD: 10.8 % (ref 4–15)
NEUTROPHILS # BLD AUTO: 4 K/UL (ref 1.8–7.7)
NEUTROPHILS NFR BLD: 51 % (ref 38–73)
NRBC BLD-RTO: 0 /100 WBC
PLATELET # BLD AUTO: 238 K/UL (ref 150–450)
PMV BLD AUTO: 11.3 FL (ref 9.2–12.9)
POTASSIUM SERPL-SCNC: 4.1 MMOL/L (ref 3.5–5.1)
PROT SERPL-MCNC: 7.9 G/DL (ref 6–8.4)
PROTHROMBIN TIME: 11.2 SEC (ref 9–12.5)
RBC # BLD AUTO: 5.3 M/UL (ref 4.6–6.2)
SODIUM SERPL-SCNC: 137 MMOL/L (ref 136–145)
WBC # BLD AUTO: 7.85 K/UL (ref 3.9–12.7)

## 2023-06-28 PROCEDURE — 85610 PROTHROMBIN TIME: CPT | Performed by: INTERNAL MEDICINE

## 2023-06-28 PROCEDURE — 36415 COLL VENOUS BLD VENIPUNCTURE: CPT | Mod: PO | Performed by: INTERNAL MEDICINE

## 2023-06-28 PROCEDURE — 85025 COMPLETE CBC W/AUTO DIFF WBC: CPT | Performed by: INTERNAL MEDICINE

## 2023-06-28 PROCEDURE — 80197 ASSAY OF TACROLIMUS: CPT | Performed by: INTERNAL MEDICINE

## 2023-06-28 PROCEDURE — 80053 COMPREHEN METABOLIC PANEL: CPT | Performed by: INTERNAL MEDICINE

## 2023-06-28 PROCEDURE — 82105 ALPHA-FETOPROTEIN SERUM: CPT | Performed by: INTERNAL MEDICINE

## 2023-06-29 ENCOUNTER — TELEPHONE (OUTPATIENT)
Dept: TRANSPLANT | Facility: CLINIC | Age: 68
End: 2023-06-29
Payer: MEDICARE

## 2023-06-29 LAB — TACROLIMUS BLD-MCNC: 7.9 NG/ML (ref 5–15)

## 2023-06-29 NOTE — TELEPHONE ENCOUNTER
Letter sent to patient stating: Your labs have been reviewed by your Transplant physician, no action required. Next labs due 9/28/23          ----- Message from Viviana Plascencia MD sent at 6/29/2023  2:17 PM CDT -----  Reviewed, nothing to do; repeat per routine

## 2023-07-03 ENCOUNTER — HOSPITAL ENCOUNTER (OUTPATIENT)
Dept: RADIOLOGY | Facility: HOSPITAL | Age: 68
Discharge: HOME | End: 2023-07-03
Attending: INTERNAL MEDICINE
Payer: MEDICARE

## 2023-07-03 ENCOUNTER — OFFICE VISIT (OUTPATIENT)
Dept: NEUROLOGY | Facility: CLINIC | Age: 68
End: 2023-07-03
Payer: MEDICARE

## 2023-07-03 ENCOUNTER — TELEPHONE (OUTPATIENT)
Dept: NEUROLOGY | Facility: CLINIC | Age: 68
End: 2023-07-03
Payer: MEDICARE

## 2023-07-03 VITALS
BODY MASS INDEX: 33.04 KG/M2 | HEART RATE: 108 BPM | WEIGHT: 230.81 LBS | RESPIRATION RATE: 16 BRPM | SYSTOLIC BLOOD PRESSURE: 91 MMHG | DIASTOLIC BLOOD PRESSURE: 64 MMHG | HEIGHT: 70 IN

## 2023-07-03 DIAGNOSIS — Z94.4 LIVER REPLACED BY TRANSPLANT: ICD-10-CM

## 2023-07-03 DIAGNOSIS — M54.9 DORSALGIA, UNSPECIFIED: ICD-10-CM

## 2023-07-03 DIAGNOSIS — M79.2 NERVE PAIN: ICD-10-CM

## 2023-07-03 DIAGNOSIS — R20.8 BURNING SENSATION OF FEET: ICD-10-CM

## 2023-07-03 DIAGNOSIS — R68.89 MULTIPLE COMPLAINTS: Primary | ICD-10-CM

## 2023-07-03 DIAGNOSIS — M79.2 NEUROPATHIC PAIN: ICD-10-CM

## 2023-07-03 DIAGNOSIS — R20.0 ANESTHESIA OF SKIN: ICD-10-CM

## 2023-07-03 DIAGNOSIS — I45.10 RBBB: ICD-10-CM

## 2023-07-03 DIAGNOSIS — R25.1 TREMORS OF NERVOUS SYSTEM: ICD-10-CM

## 2023-07-03 DIAGNOSIS — K74.69 COMPENSATED HCV CIRRHOSIS: ICD-10-CM

## 2023-07-03 DIAGNOSIS — M51.36 DDD (DEGENERATIVE DISC DISEASE), LUMBAR: ICD-10-CM

## 2023-07-03 DIAGNOSIS — Z72.0 TOBACCO ABUSE: ICD-10-CM

## 2023-07-03 DIAGNOSIS — F32.A DEPRESSION, UNSPECIFIED DEPRESSION TYPE: ICD-10-CM

## 2023-07-03 DIAGNOSIS — E11.49 TYPE 2 DIABETES MELLITUS WITH OTHER NEUROLOGIC COMPLICATION, WITHOUT LONG-TERM CURRENT USE OF INSULIN: ICD-10-CM

## 2023-07-03 DIAGNOSIS — R26.81 UNSTEADY GAIT: ICD-10-CM

## 2023-07-03 DIAGNOSIS — B19.20 COMPENSATED HCV CIRRHOSIS: ICD-10-CM

## 2023-07-03 DIAGNOSIS — F43.21 GRIEVING: ICD-10-CM

## 2023-07-03 PROCEDURE — 3288F PR FALLS RISK ASSESSMENT DOCUMENTED: ICD-10-PCS | Mod: CPTII,S$GLB,, | Performed by: NURSE PRACTITIONER

## 2023-07-03 PROCEDURE — 1159F PR MEDICATION LIST DOCUMENTED IN MEDICAL RECORD: ICD-10-PCS | Mod: CPTII,S$GLB,, | Performed by: NURSE PRACTITIONER

## 2023-07-03 PROCEDURE — 99205 OFFICE O/P NEW HI 60 MIN: CPT | Mod: S$GLB,,, | Performed by: NURSE PRACTITIONER

## 2023-07-03 PROCEDURE — 76705 ECHO EXAM OF ABDOMEN: CPT | Mod: 26,59,, | Performed by: RADIOLOGY

## 2023-07-03 PROCEDURE — 99205 PR OFFICE/OUTPT VISIT, NEW, LEVL V, 60-74 MIN: ICD-10-PCS | Mod: S$GLB,,, | Performed by: NURSE PRACTITIONER

## 2023-07-03 PROCEDURE — 99999 PR PBB SHADOW E&M-EST. PATIENT-LVL V: CPT | Mod: PBBFAC,,, | Performed by: NURSE PRACTITIONER

## 2023-07-03 PROCEDURE — 3078F DIAST BP <80 MM HG: CPT | Mod: CPTII,S$GLB,, | Performed by: NURSE PRACTITIONER

## 2023-07-03 PROCEDURE — 1125F PR PAIN SEVERITY QUANTIFIED, PAIN PRESENT: ICD-10-PCS | Mod: CPTII,S$GLB,, | Performed by: NURSE PRACTITIONER

## 2023-07-03 PROCEDURE — 1101F PT FALLS ASSESS-DOCD LE1/YR: CPT | Mod: CPTII,S$GLB,, | Performed by: NURSE PRACTITIONER

## 2023-07-03 PROCEDURE — 1160F RVW MEDS BY RX/DR IN RCRD: CPT | Mod: CPTII,S$GLB,, | Performed by: NURSE PRACTITIONER

## 2023-07-03 PROCEDURE — 93976 VASCULAR STUDY: CPT | Mod: TC

## 2023-07-03 PROCEDURE — 3074F PR MOST RECENT SYSTOLIC BLOOD PRESSURE < 130 MM HG: ICD-10-PCS | Mod: CPTII,S$GLB,, | Performed by: NURSE PRACTITIONER

## 2023-07-03 PROCEDURE — 3008F PR BODY MASS INDEX (BMI) DOCUMENTED: ICD-10-PCS | Mod: CPTII,S$GLB,, | Performed by: NURSE PRACTITIONER

## 2023-07-03 PROCEDURE — 1160F PR REVIEW ALL MEDS BY PRESCRIBER/CLIN PHARMACIST DOCUMENTED: ICD-10-PCS | Mod: CPTII,S$GLB,, | Performed by: NURSE PRACTITIONER

## 2023-07-03 PROCEDURE — 1159F MED LIST DOCD IN RCRD: CPT | Mod: CPTII,S$GLB,, | Performed by: NURSE PRACTITIONER

## 2023-07-03 PROCEDURE — 3074F SYST BP LT 130 MM HG: CPT | Mod: CPTII,S$GLB,, | Performed by: NURSE PRACTITIONER

## 2023-07-03 PROCEDURE — 76705 US DOPPLER LIVER TRANSPLANT POST (XPD): ICD-10-PCS | Mod: 26,59,, | Performed by: RADIOLOGY

## 2023-07-03 PROCEDURE — 99999 PR PBB SHADOW E&M-EST. PATIENT-LVL V: ICD-10-PCS | Mod: PBBFAC,,, | Performed by: NURSE PRACTITIONER

## 2023-07-03 PROCEDURE — 3008F BODY MASS INDEX DOCD: CPT | Mod: CPTII,S$GLB,, | Performed by: NURSE PRACTITIONER

## 2023-07-03 PROCEDURE — 3288F FALL RISK ASSESSMENT DOCD: CPT | Mod: CPTII,S$GLB,, | Performed by: NURSE PRACTITIONER

## 2023-07-03 PROCEDURE — 1125F AMNT PAIN NOTED PAIN PRSNT: CPT | Mod: CPTII,S$GLB,, | Performed by: NURSE PRACTITIONER

## 2023-07-03 PROCEDURE — 93976 US DOPPLER LIVER TRANSPLANT POST (XPD): ICD-10-PCS | Mod: 26,,, | Performed by: RADIOLOGY

## 2023-07-03 PROCEDURE — 1101F PR PT FALLS ASSESS DOC 0-1 FALLS W/OUT INJ PAST YR: ICD-10-PCS | Mod: CPTII,S$GLB,, | Performed by: NURSE PRACTITIONER

## 2023-07-03 PROCEDURE — 93976 VASCULAR STUDY: CPT | Mod: 26,,, | Performed by: RADIOLOGY

## 2023-07-03 PROCEDURE — 3078F PR MOST RECENT DIASTOLIC BLOOD PRESSURE < 80 MM HG: ICD-10-PCS | Mod: CPTII,S$GLB,, | Performed by: NURSE PRACTITIONER

## 2023-07-03 RX ORDER — AMITRIPTYLINE HYDROCHLORIDE 25 MG/1
25 TABLET, FILM COATED ORAL NIGHTLY
Qty: 30 TABLET | Refills: 11 | Status: SHIPPED | OUTPATIENT
Start: 2023-07-03 | End: 2024-01-03 | Stop reason: SDUPTHER

## 2023-07-03 NOTE — PROGRESS NOTES
Subjective:       Patient ID: Chapo Jacobo is a 67 y.o. male.    Chief Complaint: nerve pain , Depression, and Patient Education      Referred Moreno Aldana MD       HPI The patient is new to me. Patient is present for multiple complaints related to nerve pain, depression and grieving.  Patient reports longstanding complaint of burning sensation of bilateral feet, he reports it is worse at night. Patient denies any pain in upper extremity he reports having some numbness in bilateral hands but reports it is not bothersome at this time. Patient reports that he aware his he has neuropathy patient reports it is related to his uncontrolled DM. Patient states he don't check his glucose. Chronic DM Type II . No known history of thyroid disease.  Patient reports that he is exteremly depressed, he states that he has loss motivation, most days he don't want to get out of bed, Patient states that he feels he has PTSD. Patient taking Prozasc 80 mg po daily, and Xanax 1 mg po BID (takes in on AM dose) managed by PCP.  The patients reports significant family loss Mother and daughter in law, son OD on fentanyl.  Patient has difficulty with sleep, takes Amiebin 10 mg po reports it is not working.  No history of malignancies.  No history of toxic environmental exposure. S/p Liver Transplant. The patient has chronic Neck/LBP as well.   No history of autoimmune disease. Positive History for excessive alcohol and recreational drug abuse stopped 2005. Patient reports that he was a IV drug user.  History of chronic hepatitis C related to drug use. No known history of malabsorption or nutritional deficiencies. Failed GBP, Failed PGB.     Review of Systems   Constitutional:  Positive for activity change.   HENT: Negative.     Eyes: Negative.    Respiratory: Negative.     Cardiovascular: Negative.    Gastrointestinal: Negative.    Endocrine: Negative.    Genitourinary:  Positive for urgency.   Musculoskeletal:  Positive for  arthralgias, back pain and gait problem.   Skin: Negative.    Allergic/Immunologic: Negative.    Neurological:  Positive for numbness.        BURNING SENSATION BILATERAL FEET    Hematological: Negative.    Psychiatric/Behavioral:  Positive for decreased concentration, dysphoric mood and sleep disturbance. Negative for confusion and hallucinations. The patient is nervous/anxious. The patient is not hyperactive.         SAD               Current Outpatient Medications:     alprazolam (XANAX) 1 MG tablet, Take 1 mg by mouth 2 (two) times daily. Patient takes 1 tablet twice daily, Disp: , Rfl: 1    cyclobenzaprine (FLEXERIL) 10 MG tablet, Take 0.5 tablets (5 mg total) by mouth 3 (three) times daily as needed for Muscle spasms., Disp: 15 tablet, Rfl: 0    dextroamphetamine-amphetamine 30 mg Tab, Take 1 tablet by mouth as needed. , Disp: , Rfl:     FLUoxetine 20 MG capsule, Take 4 capsules by mouth Daily. , Disp: , Rfl:     glimepiride (AMARYL) 4 MG tablet, Take 1 tablet by mouth as needed. , Disp: , Rfl:     hydrocortisone 2.5 % cream, , Disp: , Rfl: 0    levocetirizine (XYZAL) 5 MG tablet, Take 5 mg by mouth every evening., Disp: , Rfl:     losartan (COZAAR) 50 MG tablet, Take 50 mg by mouth once daily., Disp: , Rfl:     metformin (GLUCOPHAGE) 1000 MG tablet, Take 1 tablet by mouth Daily., Disp: , Rfl:     tacrolimus (PROGRAF) 1 MG Cap, Take 2 capsules (2 mg total) by mouth every 12 (twelve) hours., Disp: 360 capsule, Rfl: 3    valacyclovir (VALTREX) 500 MG tablet, Take 500 mg by mouth once daily. , Disp: , Rfl:     amitriptyline (ELAVIL) 25 MG tablet, Take 1 tablet (25 mg total) by mouth every evening., Disp: 30 tablet, Rfl: 11    isosorbide mononitrate (IMDUR) 30 MG 24 hr tablet, Take 1 tablet (30 mg total) by mouth once daily., Disp: 30 tablet, Rfl: 11    nitroGLYCERIN (NITROSTAT) 0.4 MG SL tablet, Place 1 tablet (0.4 mg total) under the tongue every 5 (five) minutes as needed for Chest pain., Disp: 30 tablet, Rfl:  12    tamsulosin (FLOMAX) 0.4 mg Cap, Take 1 capsule (0.4 mg total) by mouth once daily., Disp: 30 capsule, Rfl: 0  Past Medical History:   Diagnosis Date    Anxiety     Appendicitis     Depression     Diabetes mellitus     Encounter for blood transfusion     Gallbladder & bile duct stone with obstruction     Herpes     Sleep apnea      Past Surgical History:   Procedure Laterality Date    ABDOMINAL SURGERY      APPENDECTOMY      bilateral rotator cuff surgery      BRAIN SURGERY      CERVICAL FUSION      CHOLECYSTECTOMY      COLONOSCOPY N/A 7/10/2017    Procedure: COLONOSCOPY;  Surgeon: Viviana Reyes MD;  Location: [facility];  Service: Endoscopy;  Laterality: N/A;    LIVER TRANSPLANT      LUMBAR DISC SURGERY      TONSILLECTOMY       Social History     Socioeconomic History    Marital status: Single   Tobacco Use    Smoking status: Every Day     Packs/day: 1.00     Years: 46.00     Pack years: 46.00     Types: Cigarettes    Smokeless tobacco: Never   Substance and Sexual Activity    Alcohol use: Yes     Alcohol/week: 2.0 standard drinks     Types: 2 Cans of beer per week    Drug use: No    Sexual activity: Yes     Partners: Female             Past/Current Medical/Surgical History, Past/Current Social History, Past/Current Family History and Past/Current Medications were reviewed in detail.        Objective:           VITAL SIGNS WERE REVIEWED      GENERAL APPEARANCE:     The patient looks comfortable, sad, depressed.     BMI 33.12 KG     No signs of respiratory distress.    Normal breathing pattern.    No dysmorphic features    Normal eye contact.     GENERAL MEDICAL EXAM:    HEENT:  Head is atraumatic normocephalic.     No tender temporal arteries. Fundoscopic (Ophthalmoscopic) exam showed no disc edema.      Neck and Axillae: No JVD. No visible lesions.    No carotid bruits. No thyromegaly. No lymphadenopathy.    Cardiopulmonary: No cyanosis. No tachypnea. Normal respiratory effort.     Gastrointestinal/Urogenital:   No jaundice. No stomas or lesions. No visible hernias. No catheters.     Abdomen is soft non-tender. No masses or organomegaly.    Skin, Hair and Nails: No pathognonomic skin rash. No neurofibromatosis. No visible lesions.No stigmata of autoimmune disease. No clubbing.    Skin is warm and moist. No palpable masses.    Limbs: No varicose veins. No visible swelling.    No palpable edema. Pulses are symmetric. Pedal pulses are palpable.      Muskoskeletal: + OA visible deformities.No visible lesions.    No spine tenderness. + signs of longstanding neuropathy. No dislocations or fractures.            Neurologic Exam     Mental Status   Oriented to person, place, and time.   Registration: recalls 3 of 3 objects. Recall at 5 minutes: recalls 3 of 3 objects. Follows 3 step commands.   Attention: decreased. Concentration: decreased.   Speech: speech is normal and not slurred   Level of consciousness: alert  Knowledge: good and consistent with education. Able to perform simple calculations.   Able to name object. Able to read. Able to repeat. Able to write. Normal comprehension.     Cranial Nerves     CN II   Visual fields full to confrontation.   Visual acuity: normal with correction  Right visual field deficit: none  Left visual field deficit: none     CN III, IV, VI   Pupils are equal, round, and reactive to light.  Extraocular motions are normal.   Right pupil: Size: 2 mm. Shape: regular. Reactivity: brisk. Consensual response: intact. Accommodation: intact.   Left pupil: Size: 2 mm. Shape: regular. Reactivity: brisk. Consensual response: intact. Accommodation: intact.   CN III: no CN III palsy  CN VI: no CN VI palsy  Nystagmus: none   Diplopia: none  Ophthalmoparesis: none  Upgaze: normal  Downgaze: normal  Conjugate gaze: present  Vestibulo-ocular reflex: present    CN V   Facial sensation intact.   Right facial sensation deficit: none  Left facial sensation deficit: none    CN VII   Right facial weakness: none  Left  facial weakness: none    CN VIII   CN VIII normal.   Hearing: impaired    CN IX, X   CN IX normal.   CN X normal.   Palate: symmetric    CN XI   CN XI normal.   Right sternocleidomastoid strength: normal  Left sternocleidomastoid strength: normal  Right trapezius strength: normal  Left trapezius strength: normal    CN XII   CN XII normal.   Tongue: not atrophic  Fasciculations: absent  Tongue deviation: none    Motor Exam   Muscle bulk: normal  Overall muscle tone: normal  Right arm tone: normal  Left arm tone: normal  Right arm pronator drift: absent  Left arm pronator drift: absent  Right leg tone: normal  Left leg tone: normal    Strength   Strength 5/5 throughout.   Right neck flexion: 5/5  Left neck flexion: 5/5  Right neck extension: 5/5  Left neck extension: 5/5  Right deltoid: 5/5  Left deltoid: 5/5  Right biceps: 5/5  Left biceps: 5/5  Right triceps: 5/5  Left triceps: 5/5  Right wrist flexion: 5/5  Left wrist flexion: 5/5  Right wrist extension: 5/5  Left wrist extension: 5/5  Right interossei: 5/5  Left interossei: 5/5  Right iliopsoas: 5/5  Left iliopsoas: 5/5  Right quadriceps: 5/5  Left quadriceps: 5/5  Right hamstrin/5  Left hamstrin/5  Right glutei: 5/5  Left glutei: 5/5  Right anterior tibial: 5/5  Left anterior tibial: 5/5  Right posterior tibial: 5/5  Left posterior tibial: 5/5  Right peroneal: 5/5  Left peroneal: 5/5  Right gastroc: 5/5  Left gastroc: 5/5    Sensory Exam   Right arm light touch: normal  Left arm light touch: normal  Right leg light touch: decreased from toes  Left leg light touch: decreased from toes  Right arm vibration: normal  Left arm vibration: normal  Right leg vibration: decreased from toes  Left leg vibration: decreased from toes  Right arm proprioception: normal  Left arm proprioception: normal  Right leg proprioception: decreased from ankle  Left leg proprioception: decreased from ankle  Right arm pinprick: normal  Left arm pinprick: normal  Right leg pinprick:  decreased from toes  Left leg pinprick: decreased from toes  Graphesthesia: normal  Stereognosis: normal    Gait, Coordination, and Reflexes     Gait  Gait: wide-based    Coordination   Finger to nose coordination: normal    Tremor   Resting tremor: absent  Intention tremor: absent  Action tremor: left arm    Reflexes   Right brachioradialis: 2+  Left brachioradialis: 2+  Right biceps: 2+  Left biceps: 2+  Right triceps: 2+  Left triceps: 2+  Right patellar: 2+  Left patellar: 2+  Right achilles: 0  Left achilles: 0  Right plantar: normal  Left plantar: normal  Right Bryant: absent  Left Bryant: absent  Right ankle clonus: absent  Left ankle clonus: absent  Right pendular knee jerk: absent  Left pendular knee jerk: absent    Postural ET R>L      Unsteady gait limping            Lab Results   Component Value Date    WBC 7.85 06/28/2023    HGB 17.2 06/28/2023    HCT 51.3 06/28/2023    MCV 97 06/28/2023     06/28/2023     Sodium   Date Value Ref Range Status   07/03/2023 138 136 - 145 mmol/L Final     Potassium   Date Value Ref Range Status   07/03/2023 3.9 3.5 - 5.1 mmol/L Final     Chloride   Date Value Ref Range Status   07/03/2023 101 95 - 110 mmol/L Final     CO2   Date Value Ref Range Status   07/03/2023 24 23 - 29 mmol/L Final     Glucose   Date Value Ref Range Status   07/03/2023 209 (H) 70 - 110 mg/dL Final     BUN   Date Value Ref Range Status   07/03/2023 11 8 - 23 mg/dL Final     Creatinine   Date Value Ref Range Status   07/03/2023 1.1 0.5 - 1.4 mg/dL Final     Calcium   Date Value Ref Range Status   07/03/2023 9.2 8.7 - 10.5 mg/dL Final     Total Protein   Date Value Ref Range Status   07/03/2023 7.8 6.0 - 8.4 g/dL Final     Albumin   Date Value Ref Range Status   07/03/2023 4.1 3.5 - 5.2 g/dL Final     Total Bilirubin   Date Value Ref Range Status   07/03/2023 0.7 0.1 - 1.0 mg/dL Final     Comment:     For infants and newborns, interpretation of results should be based  on gestational age,  weight and in agreement with clinical  observations.    Premature Infant recommended reference ranges:  Up to 24 hours.............<8.0 mg/dL  Up to 48 hours............<12.0 mg/dL  3-5 days..................<15.0 mg/dL  6-29 days.................<15.0 mg/dL       Alkaline Phosphatase   Date Value Ref Range Status   07/03/2023 65 55 - 135 U/L Final     AST   Date Value Ref Range Status   07/03/2023 19 10 - 40 U/L Final     ALT   Date Value Ref Range Status   07/03/2023 16 10 - 44 U/L Final     Anion Gap   Date Value Ref Range Status   07/03/2023 13 8 - 16 mmol/L Final     eGFR if    Date Value Ref Range Status   04/06/2022 >60 >60 mL/min/1.73 m^2 Final     eGFR if non    Date Value Ref Range Status   04/06/2022 >60 >60 mL/min/1.73 m^2 Final     Comment:     Calculation used to obtain the estimated glomerular filtration  rate (eGFR) is the CKD-EPI equation.        Lab Results   Component Value Date    OLOXEUPP07 309 07/03/2023     Lab Results   Component Value Date    TSH 2.487 09/28/2022       MRI L-Spine WO:    07-    Postsurgical changes of the L5-S1 posterior elements/spinous processes.  Left L5-S1 subarticular zone disc protrusion narrows the left lateral recess and contributes to mild-to-moderate left-sided neural foraminal stenosis.  2. Mild L4-L5 spinal canal stenosis with mild right and minor left-sided neural foraminal stenosis.      MRI Brain WO:    07-      MRI Brain NL age related changes     Reviewed the neuroimaging independently       Assessment:       1. Multiple complaints    2. Neuropathic pain    3. Burning sensation of feet    4. Liver replaced by transplant    5. Nerve pain    6. Depression, unspecified depression type    7. Grieving    8. Type 2 diabetes mellitus with other neurologic complication, without long-term current use of insulin    9. Anesthesia of skin    10. Dorsalgia, unspecified    11. Unsteady gait    12. Tremors of nervous system         Plan:               MULTIPLE COMPLAINTS: NEUROPATHIC PAIN MULTIFACTORIAL: HX DRUG ABUSE , HEP C/ DPN, T2DM/ Hx of LIVER TRANSPLANT/ CHRONIC BACK PAIN NUMBNESS TINGLING/ BURNING SENSATION UNSTEADY GAIT MDD/ GRIEVING/ INSOMNIA           EVALUATE    MRI BRAIN AND MRI LUMBAR SPINE WO     B12, FA-HC Hemoglobin A 1 C, ÓSCAR    Refer to Physical therapy evaluation and treat    BS control.    Hold off on neuropathic pain meds since she has no pain.    Discussed with the patient some of the neuropathy precautions like:    Always use oven mitts.    Test water with an unaffected hand or foot.    Use caution when trimming nails. File sharp areas    Wear shoes that fit well to avoid pressure points, blisters, and ulcers.    Inspect your hands and feet carefully (including the soles of your feet and between your toes) at least once a week.            In terms of management I counseled the patient that neuropathic pain meds target is 40% reduction of pain.      Will start  Amitriptyline/Elavil 25 mg QHS for neuropathic pain and insomnia      Failed Gabapentin/GBP-Neurontin, Failed  Pregabalin/PGB-Lyrica     NEXT OPTIONS:      Duloxetine/Cymbalta which causes sedation, weight gain and sexual dysfunction and potentially interactions can cause serotonin syndrome. Will titrate slowly to 60 mg daily.      Neuropathy precautions like:     Always use oven mitts.     Test water with an unaffected hand or foot.     Use caution when trimming nails. File sharp areas     Wear shoes that fit well to avoid pressure points, blisters, and ulcers.     Inspect your hands and feet carefully (including the soles of your feet and between your toes) at least once a week.    MDD/GRIEVING FAMILY LOSS     Refer to Psychiatry and psychologist (Urgent)     Continue Prozac 80 mg po Daily and Xanax 2 mg po  ( managed by PCP)       TREMORS OF NERVOUS SYSTEM BUE, MILD    Clinically monitor    Avoid stimulants like caffeine, nicotineetc.    Avoid hot drinks,  drink from half empty glasses and wear heavy bracelet/watch.       MEDICAL/SURGICAL COMORBIDITIES     All relevant medical comorbidities noted and managed by primary care physician and medical care team.          MISCELLANEOUS MEDICAL PROBLEMS       HEALTHY LIFESTYLE AND PREVENTATIVE CARE    Encouraged the patient to adhere to the age-appropriate health maintenance guidelines including screening tests and vaccinations.     Discussed the overall importance of healthy lifestyle, optimal weight, exercise, healthy diet, good sleep hygiene and avoiding drugs including smoking, alcohol and recreational drugs. The patient verbalized full understanding.       Advised the patient to follow COVID-19 prevention measures.       I spent 150 minutes face to face with the patient    Time spent in counseling and coordination of care including discussions etiology of diagnosis, pathogenesis of diagnosis, prognosis of diagnosis,, diagnostic results, impression and recommendations, diagnostic studies, management, risks and benefits of treatment, instructions of disease self-management, treatment instructions, follow up requirements, patient and family counseling/involvement in care compliance with treatment regimen. All of the patient's questions were answered during this discussion.       Radha Mcgarry, MSN NP      Collaborating Provider: Stacey Cohen MD, FAAN Neurologist/Epileptologist

## 2023-07-05 ENCOUNTER — TELEPHONE (OUTPATIENT)
Dept: TRANSPLANT | Facility: CLINIC | Age: 68
End: 2023-07-05
Payer: MEDICARE

## 2023-07-05 DIAGNOSIS — Z94.4 LIVER TRANSPLANTED: Primary | ICD-10-CM

## 2023-07-05 NOTE — TELEPHONE ENCOUNTER
Letter sent to patient stating : Your imaging has been reviewed by your physician, no action required.          ----- Message from Viviana Plascencia MD sent at 7/4/2023  1:45 PM CDT -----  Liver tests normal so difficult to interpret the US nothing to do differently at this time

## 2023-07-05 NOTE — LETTER
July 5, 2023    Chapo Jacobo  [address] [facility] [address]          Dear Chapo Jacobo:  MRN: [id number]    This is a follow up to your recent ultrasound.  There are no action is required at this time.  Next Liver Ultrasound due December 2023      Sincerely,    Shanice VILLATORON, RN        Your Liver Transplant Coordinator    [facility]  [address] [place] [id number]  [phone]

## 2023-07-06 ENCOUNTER — TELEPHONE (OUTPATIENT)
Dept: NEUROLOGY | Facility: CLINIC | Age: 68
End: 2023-07-06
Payer: MEDICARE

## 2023-07-06 NOTE — TELEPHONE ENCOUNTER
----- Message from Maria T Mcgarry NP sent at 7/6/2023 12:22 PM CDT -----  Labs Reviewed:    07-      CMP NL except Glucose 209^, HC 9.5 NL, RF <13.0 NL, ÓSCAR neg, FA level 7.0 NL, Vitamin B 12 309 Nl  (optimal level <450), Vitamin B 1 pending       Labs NL    CMP NL except Glucose 209^, managed by PCP

## 2023-07-07 ENCOUNTER — TELEPHONE (OUTPATIENT)
Dept: NEUROLOGY | Facility: CLINIC | Age: 68
End: 2023-07-07
Payer: MEDICARE

## 2023-07-07 ENCOUNTER — OFFICE VISIT (OUTPATIENT)
Dept: CARDIOLOGY | Facility: CLINIC | Age: 68
End: 2023-07-07
Payer: MEDICARE

## 2023-07-07 VITALS
HEART RATE: 101 BPM | OXYGEN SATURATION: 96 % | BODY MASS INDEX: 33.23 KG/M2 | SYSTOLIC BLOOD PRESSURE: 140 MMHG | WEIGHT: 232.13 LBS | HEIGHT: 70 IN | DIASTOLIC BLOOD PRESSURE: 80 MMHG

## 2023-07-07 DIAGNOSIS — R94.31 ABNORMAL ECG: ICD-10-CM

## 2023-07-07 DIAGNOSIS — I73.9 PAD (PERIPHERAL ARTERY DISEASE): ICD-10-CM

## 2023-07-07 DIAGNOSIS — I45.10 RBBB: ICD-10-CM

## 2023-07-07 DIAGNOSIS — R53.82 CHRONIC FATIGUE: ICD-10-CM

## 2023-07-07 DIAGNOSIS — Z91.89 AT RISK FOR SLEEP APNEA: ICD-10-CM

## 2023-07-07 DIAGNOSIS — R06.09 DOE (DYSPNEA ON EXERTION): ICD-10-CM

## 2023-07-07 DIAGNOSIS — I20.9 AP (ANGINA PECTORIS): Primary | ICD-10-CM

## 2023-07-07 DIAGNOSIS — I10 ESSENTIAL HYPERTENSION: ICD-10-CM

## 2023-07-07 DIAGNOSIS — R07.89 ATYPICAL CHEST PAIN: ICD-10-CM

## 2023-07-07 DIAGNOSIS — E66.09 CLASS 1 OBESITY DUE TO EXCESS CALORIES WITH SERIOUS COMORBIDITY AND BODY MASS INDEX (BMI) OF 33.0 TO 33.9 IN ADULT: ICD-10-CM

## 2023-07-07 DIAGNOSIS — Z72.0 TOBACCO ABUSE: ICD-10-CM

## 2023-07-07 DIAGNOSIS — Z94.4 LIVER REPLACED BY TRANSPLANT: ICD-10-CM

## 2023-07-07 DIAGNOSIS — E11.49 TYPE 2 DIABETES MELLITUS WITH OTHER NEUROLOGIC COMPLICATION, WITHOUT LONG-TERM CURRENT USE OF INSULIN: ICD-10-CM

## 2023-07-07 DIAGNOSIS — I71.21 ANEURYSM OF ASCENDING AORTA WITHOUT RUPTURE: ICD-10-CM

## 2023-07-07 PROBLEM — E66.811 CLASS 1 OBESITY DUE TO EXCESS CALORIES WITH SERIOUS COMORBIDITY AND BODY MASS INDEX (BMI) OF 33.0 TO 33.9 IN ADULT: Status: ACTIVE | Noted: 2023-07-07

## 2023-07-07 PROCEDURE — 1101F PR PT FALLS ASSESS DOC 0-1 FALLS W/OUT INJ PAST YR: ICD-10-PCS | Mod: CPTII,S$GLB,, | Performed by: INTERNAL MEDICINE

## 2023-07-07 PROCEDURE — 3079F DIAST BP 80-89 MM HG: CPT | Mod: CPTII,S$GLB,, | Performed by: INTERNAL MEDICINE

## 2023-07-07 PROCEDURE — 3008F BODY MASS INDEX DOCD: CPT | Mod: CPTII,S$GLB,, | Performed by: INTERNAL MEDICINE

## 2023-07-07 PROCEDURE — 99999 PR PBB SHADOW E&M-EST. PATIENT-LVL IV: ICD-10-PCS | Mod: PBBFAC,,, | Performed by: INTERNAL MEDICINE

## 2023-07-07 PROCEDURE — 3288F FALL RISK ASSESSMENT DOCD: CPT | Mod: CPTII,S$GLB,, | Performed by: INTERNAL MEDICINE

## 2023-07-07 PROCEDURE — 99999 PR PBB SHADOW E&M-EST. PATIENT-LVL IV: CPT | Mod: PBBFAC,,, | Performed by: INTERNAL MEDICINE

## 2023-07-07 PROCEDURE — 1126F AMNT PAIN NOTED NONE PRSNT: CPT | Mod: CPTII,S$GLB,, | Performed by: INTERNAL MEDICINE

## 2023-07-07 PROCEDURE — 99205 PR OFFICE/OUTPT VISIT, NEW, LEVL V, 60-74 MIN: ICD-10-PCS | Mod: S$GLB,,, | Performed by: INTERNAL MEDICINE

## 2023-07-07 PROCEDURE — 3077F SYST BP >= 140 MM HG: CPT | Mod: CPTII,S$GLB,, | Performed by: INTERNAL MEDICINE

## 2023-07-07 PROCEDURE — 3288F PR FALLS RISK ASSESSMENT DOCUMENTED: ICD-10-PCS | Mod: CPTII,S$GLB,, | Performed by: INTERNAL MEDICINE

## 2023-07-07 PROCEDURE — 3008F PR BODY MASS INDEX (BMI) DOCUMENTED: ICD-10-PCS | Mod: CPTII,S$GLB,, | Performed by: INTERNAL MEDICINE

## 2023-07-07 PROCEDURE — 99205 OFFICE O/P NEW HI 60 MIN: CPT | Mod: S$GLB,,, | Performed by: INTERNAL MEDICINE

## 2023-07-07 PROCEDURE — 1126F PR PAIN SEVERITY QUANTIFIED, NO PAIN PRESENT: ICD-10-PCS | Mod: CPTII,S$GLB,, | Performed by: INTERNAL MEDICINE

## 2023-07-07 PROCEDURE — 3077F PR MOST RECENT SYSTOLIC BLOOD PRESSURE >= 140 MM HG: ICD-10-PCS | Mod: CPTII,S$GLB,, | Performed by: INTERNAL MEDICINE

## 2023-07-07 PROCEDURE — 3079F PR MOST RECENT DIASTOLIC BLOOD PRESSURE 80-89 MM HG: ICD-10-PCS | Mod: CPTII,S$GLB,, | Performed by: INTERNAL MEDICINE

## 2023-07-07 PROCEDURE — 1101F PT FALLS ASSESS-DOCD LE1/YR: CPT | Mod: CPTII,S$GLB,, | Performed by: INTERNAL MEDICINE

## 2023-07-07 PROCEDURE — 99214 OFFICE O/P EST MOD 30 MIN: CPT | Mod: PBBFAC | Performed by: INTERNAL MEDICINE

## 2023-07-07 RX ORDER — ISOSORBIDE MONONITRATE 30 MG/1
30 TABLET, EXTENDED RELEASE ORAL DAILY
Qty: 30 TABLET | Refills: 11 | Status: SHIPPED | OUTPATIENT
Start: 2023-07-07 | End: 2024-01-19 | Stop reason: SDUPTHER

## 2023-07-07 NOTE — PROGRESS NOTES
Subjective:    Patient ID:  Chapo Jacobo is a 67 y.o. male who presents for evaluation of Hyperlipidemia, Hypertension, Aortic Aneurysm, Chest Pain, Coronary Artery Disease, and Peripheral Arterial Disease        HPI  Pt presents for eval.  He has only been seen once before, in March 2020.  He has DM, HTN, RBBB, hyperlipidemia, obesity, COPD, liver transplant 20+  years ago due to HCV.  + Smoker.   Past hx pertinent for following:   He states a doctor Dr. Thomas Clifton at [facility] saw him in 2022 for ascending aortic aneurysm 4.3 cm.  F/u CT scan advised in 6 months for monitoring.  When seen in March 2022, Imdur added for CP.  Nuclear stress test March 2020: no ischemia, normal EF.  Echo March 2020 normal LV function.  CTA chest Sept 2022 (ER): 4.4 cm aortic aneurysm, no dissection, COPD.  Has occasional CP, takes sl ntg prn.  He does not think he is taking Isosorbide prescribed last appt.  Last CP episode few months ago.  Chronic CP seems stable.  Has poor stamina, fatigue, lack of motivation.  Not seeing CV surgery on regular basis for his ascending aortic aneurysm.  Has chronic IRIZARRY and fatigue with little activity.  No syncope.  Smokes 1 ppd.  Ecg 9/28/22 NSR, RBBB, possible old inferior infarct.  Has some depression, lost family members over last year.          Current Outpatient Medications:     alprazolam (XANAX) 1 MG tablet, Take 1 mg by mouth 2 (two) times daily. Patient takes 1 tablet twice daily, Disp: , Rfl: 1    amitriptyline (ELAVIL) 25 MG tablet, Take 1 tablet (25 mg total) by mouth every evening., Disp: 30 tablet, Rfl: 11    cyclobenzaprine (FLEXERIL) 10 MG tablet, Take 0.5 tablets (5 mg total) by mouth 3 (three) times daily as needed for Muscle spasms., Disp: 15 tablet, Rfl: 0    dextroamphetamine-amphetamine 30 mg Tab, Take 1 tablet by mouth as needed. , Disp: , Rfl:     FLUoxetine 20 MG capsule, Take 4 capsules by mouth Daily. , Disp: , Rfl:     glimepiride (AMARYL) 4 MG tablet, Take 1 tablet by  "mouth as needed. , Disp: , Rfl:     hydrocortisone 2.5 % cream, , Disp: , Rfl: 0    levocetirizine (XYZAL) 5 MG tablet, Take 5 mg by mouth every evening., Disp: , Rfl:     losartan (COZAAR) 50 MG tablet, Take 50 mg by mouth once daily., Disp: , Rfl:     metformin (GLUCOPHAGE) 1000 MG tablet, Take 1 tablet by mouth Daily., Disp: , Rfl:     nitroGLYCERIN (NITROSTAT) 0.4 MG SL tablet, Place 1 tablet (0.4 mg total) under the tongue every 5 (five) minutes as needed for Chest pain., Disp: 30 tablet, Rfl: 12    tacrolimus (PROGRAF) 1 MG Cap, Take 2 capsules (2 mg total) by mouth every 12 (twelve) hours., Disp: 360 capsule, Rfl: 3    tamsulosin (FLOMAX) 0.4 mg Cap, Take 1 capsule (0.4 mg total) by mouth once daily., Disp: 30 capsule, Rfl: 0    valacyclovir (VALTREX) 500 MG tablet, Take 500 mg by mouth once daily. , Disp: , Rfl:     isosorbide mononitrate (IMDUR) 30 MG 24 hr tablet, Take 1 tablet (30 mg total) by mouth once daily., Disp: 30 tablet, Rfl: 11      Review of Systems   Constitutional: Positive for malaise/fatigue.   HENT: Negative.     Eyes: Negative.    Cardiovascular:  Positive for chest pain and dyspnea on exertion.   Respiratory:  Positive for shortness of breath and snoring.    Endocrine: Negative.    Hematologic/Lymphatic: Negative.    Skin: Negative.    Musculoskeletal:  Positive for arthritis and joint pain.   Gastrointestinal: Negative.    Genitourinary: Negative.    Neurological:  Positive for weakness.   Psychiatric/Behavioral:  Positive for depression.    Allergic/Immunologic: Negative.      BP (!) 140/80 (BP Location: Right arm, Patient Position: Sitting, BP Method: Large (Manual))   Pulse 101   Ht 5' 10" (1.778 m)   Wt 105.3 kg (232 lb 2.3 oz)   SpO2 96%   BMI 33.31 kg/m²     Wt Readings from Last 3 Encounters:   07/07/23 105.3 kg (232 lb 2.3 oz)   07/03/23 104.7 kg (230 lb 13.2 oz)   04/10/23 101.6 kg (224 lb)     Temp Readings from Last 3 Encounters:   09/28/22 97.9 °F (36.6 °C) (Oral) "   04/17/20 98.7 °F (37.1 °C) (Oral)   07/02/19 98.6 °F (37 °C) (Oral)     BP Readings from Last 3 Encounters:   07/07/23 (!) 140/80   07/03/23 91/64   09/29/22 118/68     Pulse Readings from Last 3 Encounters:   07/07/23 101   07/03/23 108   09/29/22 87          Objective:    Physical Exam  Vitals and nursing note reviewed.   Constitutional:       Appearance: He is well-developed. He is obese.   HENT:      Head: Normocephalic.   Neck:      Thyroid: No thyromegaly.      Vascular: Normal carotid pulses. No carotid bruit, hepatojugular reflux or JVD.   Cardiovascular:      Rate and Rhythm: Normal rate and regular rhythm.      Pulses:           Radial pulses are 2+ on the right side and 2+ on the left side.        Dorsalis pedis pulses are 0 on the right side and 0 on the left side.        Posterior tibial pulses are 0 on the right side and 0 on the left side.      Heart sounds: S1 normal and S2 normal. Heart sounds not distant. No midsystolic click and no opening snap. No murmur heard.    No friction rub. No S3 or S4 sounds.   Pulmonary:      Effort: Pulmonary effort is normal.      Breath sounds: Normal breath sounds. No wheezing or rales.   Abdominal:      General: Bowel sounds are normal. There is no distension or abdominal bruit.      Palpations: Abdomen is soft. There is no mass.      Tenderness: There is no abdominal tenderness.   Musculoskeletal:      Cervical back: Normal range of motion and neck supple.   Skin:     General: Skin is warm.   Neurological:      Mental Status: He is alert and oriented to person, place, and time.   Psychiatric:         Behavior: Behavior normal.       I have reviewed all pertinent labs and cardiac studies.      Chemistry        Component Value Date/Time     07/03/2023 1212    K 3.9 07/03/2023 1212     07/03/2023 1212    CO2 24 07/03/2023 1212    BUN 11 07/03/2023 1212    CREATININE 1.1 07/03/2023 1212     (H) 07/03/2023 1212        Component Value Date/Time     CALCIUM 9.2 07/03/2023 1212    ALKPHOS 65 07/03/2023 1212    AST 19 07/03/2023 1212    ALT 16 07/03/2023 1212    BILITOT 0.7 07/03/2023 1212    ESTGFRAFRICA >60 04/06/2022 0840    EGFRNONAA >60 04/06/2022 0840        Lab Results   Component Value Date    WBC 7.85 06/28/2023    HGB 17.2 06/28/2023    HCT 51.3 06/28/2023    MCV 97 06/28/2023     06/28/2023     Lab Results   Component Value Date    TSH 2.487 09/28/2022             Lab Results   Component Value Date    HGBA1C 5.7 11/01/2016     Lab Results   Component Value Date    CHOL 188 11/01/2016    CHOL 118 (L) 12/27/2007    CHOL 89 (L) 09/21/2006     Lab Results   Component Value Date    HDL 29 (L) 11/01/2016    HDL 24 (L) 12/27/2007    HDL 21.0 (L) 09/21/2006     Lab Results   Component Value Date    LDLCALC 109.8 11/01/2016    LDLCALC 57.8 (L) 12/27/2007    LDLCALC 29.6 (L) 09/21/2006     Lab Results   Component Value Date    TRIG 246 (H) 11/01/2016    TRIG 181 (H) 12/27/2007    TRIG 192 (H) 09/21/2006     Lab Results   Component Value Date    CHOLHDL 15.4 (L) 11/01/2016    CHOLHDL 20.3 12/27/2007    CHOLHDL 23.6 09/21/2006     Results for orders placed during the hospital encounter of 03/17/20    Echo Color Flow Doppler? Yes    Interpretation Summary  · Normal left ventricular systolic function. The estimated ejection fraction is 65%.  · Normal right ventricular systolic function.  · Normal LV diastolic function.  · The estimated PA systolic pressure is 33 mmHg.        Results for orders placed during the hospital encounter of 03/17/20    Nuclear Stress - Cardiology Interpreted    Interpretation Summary    Normal myocardial perfusion study.    The perfusion scan is free of evidence from myocardial ischemia or injury.    Gated perfusion images showed an ejection fraction of 70% at rest and 63% post stress.    The EKG portion of this study is negative for ischemia.    There were no arrhythmias during stress.            Impression:     Ectatic ascending  aorta measuring up to 4.4 cm which is borderline aneurysmal and measured based on transaxial measurement.  Consider correlation to gated aortic study as clinically indicated recommend follow-up in 1 year interval.     No evidence for pulmonary emboli.  No aortic dissection.  No pneumonia.     Moderate paraseptal emphysema.  Recommend follow-up low radiation chest CT in 1 year.     All CT scans at this facility are performed  using dose modulation techniques as appropriate to performed exam including the following:  automated exposure control; adjustment of mA and/or kV according to the patients size (this includes techniques or standardized protocols for targeted exams where dose is matched to indication/reason for exam: i.e. extremities or head);  iterative reconstruction technique.        Electronically signed by: Bola Bird  Date:                                            09/29/2022  Time:                                           00:09           Exam Ended: 09/28/22 23:59 Last Resulted: 09/29/22 00:09                 Assessment:       1. AP (angina pectoris)    2. Chronic fatigue    3. Essential hypertension    4. IRIZARRY (dyspnea on exertion)    5. Abnormal ECG    6. Aneurysm of ascending aorta without rupture    7. Tobacco abuse    8. RBBB    9. Liver replaced by transplant    10. Type 2 diabetes mellitus with other neurologic complication, without long-term current use of insulin    11. Atypical chest pain    12. At risk for sleep apnea    13. PAD (peripheral artery disease)    14. Class 1 obesity due to excess calories with serious comorbidity and body mass index (BMI) of 33.0 to 33.9 in adult         Plan:           Chest pain/angina: Nuclear stress test for ischemia evaluation.  Sublingual nitroglycerin 0.4 mg for concerning chest pain episodes or breakthrough angina.  Patient advised of indications, side effects of nitroglycerin and when to report to ER.   Add Imdur 30 mg qd if CP increases (script sent  in to pharmacy in case he needs it).  IRIZARRY: multifactorial -- CAD, obesity, deconditioning, etc and likely has LETICIA, and has COPD.  Pulmonary consult advised.  Has anxiety, depression issues that seems to be a big component of his symptoms.  PAD: LUISA and B LE arterial u/s.  Aortic aneurysm: Stable on Sept 2022 CTA.  Will reevaluate with f/u CT scan after 1 year from last study.  Will need eventual referral to CV surgery again.  Reviewed all tests and above medical conditions with patient in detail and formulated treatment plan.  Continue optimal medical treatment for cardiovascular conditions.  Cardiac low salt diet advised.  Daily exercise encouraged, with the goal 30 +  minutes aerobic exercise as tolerated.  Maintaining healthy weight and weight loss goals (if needed) were discussed in clinic.  HTN: Need for BP control and HTN goals (if needed) were discussed and tx plan formulated.  Goal < 130/80.  Continue current HTN meds.  Importance of optimal lipid control were discussed in detail as well as possible pharmacologic and lifestyle changes that may be needed.  Tobacco abuse: smoking cessation advised.  Abnl ecg: Stable. Monitor.  DM: optimal HGAIC control advised.  Continue current DM meds and f/u w PCP for mgt.  Liver transplant:  f/u with GI/Hepatology service as advised.     PHONE REVIEW.    F/u in 6 months.       I have reviewed all pertinent labs and cardiac studies independently. Plans and recommendations have been formulated under my direct supervision. All questions answered and patient voiced understanding.

## 2023-07-07 NOTE — TELEPHONE ENCOUNTER
----- Message from Sera Car sent at 7/7/2023  2:25 PM CDT -----  Type:  Patient Returning Call    Who Called:Pt    Who Left Message for Patient:Riki Baez MA    Does the patient know what this is regarding?:yes Results     Would the patient rather a call back or a response via [facility]? Call back     Best Call Back Number: [phone] (mobile)     Additional Information:

## 2023-07-19 ENCOUNTER — HOSPITAL ENCOUNTER (OUTPATIENT)
Dept: RADIOLOGY | Facility: HOSPITAL | Age: 68
Discharge: HOME | End: 2023-07-19
Attending: NURSE PRACTITIONER
Payer: MEDICARE

## 2023-07-19 DIAGNOSIS — M79.2 NERVE PAIN: ICD-10-CM

## 2023-07-19 DIAGNOSIS — M54.9 DORSALGIA, UNSPECIFIED: ICD-10-CM

## 2023-07-19 DIAGNOSIS — E11.49 TYPE 2 DIABETES MELLITUS WITH OTHER NEUROLOGIC COMPLICATION, WITHOUT LONG-TERM CURRENT USE OF INSULIN: ICD-10-CM

## 2023-07-19 DIAGNOSIS — R20.0 ANESTHESIA OF SKIN: ICD-10-CM

## 2023-07-19 DIAGNOSIS — M79.2 NEUROPATHIC PAIN: ICD-10-CM

## 2023-07-19 DIAGNOSIS — R20.8 BURNING SENSATION OF FEET: ICD-10-CM

## 2023-07-19 PROCEDURE — 70551 MRI BRAIN WITHOUT CONTRAST: ICD-10-PCS | Mod: 26,,, | Performed by: RADIOLOGY

## 2023-07-19 PROCEDURE — 72148 MRI LUMBAR SPINE W/O DYE: CPT | Mod: TC

## 2023-07-19 PROCEDURE — 72148 MRI LUMBAR SPINE W/O DYE: CPT | Mod: 26,,, | Performed by: RADIOLOGY

## 2023-07-19 PROCEDURE — 72148 MRI LUMBAR SPINE WITHOUT CONTRAST: ICD-10-PCS | Mod: 26,,, | Performed by: RADIOLOGY

## 2023-07-19 PROCEDURE — 70551 MRI BRAIN STEM W/O DYE: CPT | Mod: 26,,, | Performed by: RADIOLOGY

## 2023-07-19 PROCEDURE — 70551 MRI BRAIN STEM W/O DYE: CPT | Mod: TC

## 2023-07-20 ENCOUNTER — TELEPHONE (OUTPATIENT)
Dept: NEUROSURGERY | Facility: CLINIC | Age: 68
End: 2023-07-20
Payer: MEDICARE

## 2023-07-20 ENCOUNTER — OFFICE VISIT (OUTPATIENT)
Dept: PODIATRY | Facility: CLINIC | Age: 68
End: 2023-07-20
Payer: MEDICARE

## 2023-07-20 DIAGNOSIS — E11.49 TYPE 2 DIABETES MELLITUS WITH OTHER NEUROLOGIC COMPLICATION, WITHOUT LONG-TERM CURRENT USE OF INSULIN: Primary | ICD-10-CM

## 2023-07-20 DIAGNOSIS — Z94.4 LIVER REPLACED BY TRANSPLANT: ICD-10-CM

## 2023-07-20 DIAGNOSIS — D84.9 IMMUNOSUPPRESSION: ICD-10-CM

## 2023-07-20 DIAGNOSIS — L60.2 NAIL OVERGROWTH: ICD-10-CM

## 2023-07-20 PROCEDURE — 99499 NO LOS: ICD-10-PCS | Mod: S$GLB,,, | Performed by: PODIATRIST

## 2023-07-20 PROCEDURE — 11721 PR DEBRIDEMENT OF NAILS, 6 OR MORE: ICD-10-PCS | Mod: Q9,S$GLB,, | Performed by: PODIATRIST

## 2023-07-20 PROCEDURE — 11721 DEBRIDE NAIL 6 OR MORE: CPT | Mod: Q9,S$GLB,, | Performed by: PODIATRIST

## 2023-07-20 PROCEDURE — 99999 PR PBB SHADOW E&M-EST. PATIENT-LVL III: CPT | Mod: PBBFAC,,, | Performed by: PODIATRIST

## 2023-07-20 PROCEDURE — 99999 PR PBB SHADOW E&M-EST. PATIENT-LVL III: ICD-10-PCS | Mod: PBBFAC,,, | Performed by: PODIATRIST

## 2023-07-20 PROCEDURE — 99499 UNLISTED E&M SERVICE: CPT | Mod: S$GLB,,, | Performed by: PODIATRIST

## 2023-07-20 NOTE — TELEPHONE ENCOUNTER
M for patient to call back [phone] regarding appointment for Neurosurgery referral.  Cherie Ontivreos RN

## 2023-07-20 NOTE — PROGRESS NOTES
Subjective:       Patient ID: Chapo Jacobo is a 67 y.o. male.    Chief Complaint: Diabetic Foot Exam and Nail Care (Patient is a diabetic that is in for three month nail care. )    HPI: Patient presents to the office today with the chief complaint of elongated, thickened and dystrophic nail plates to the B/L foot.  This patient is a Diabetic Type II, complicated with Peripheral Neuropathy and history of liver transplant on long-term immunosuppressive medication.. Patient does follow with Primary Care and/or Endocrinology for management of Diabetes Mellitus. This patient's PMD is Sj Escudero MD. This patient last saw his/her primary care provider on 06/15/2023    Hemoglobin A1C   Date Value Ref Range Status   11/01/2016 5.7 4.5 - 6.2 % Final     Comment:     According to ADA guidelines, hemoglobin A1C <7.0% represents  optimal control in non-pregnant diabetic patients.  Different  metrics may apply to specific populations.   Standards of Medical Care in Diabetes - 2016.  For the purpose of screening for the presence of diabetes:  <5.7%     Consistent with the absence of diabetes  5.7-6.4%  Consistent with increasing risk for diabetes   (prediabetes)  >or=6.5%  Consistent with diabetes  Currently no consensus exists for use of hemoglobin A1C  for diagnosis of diabetes for children.     02/16/2006 6.3 (H) 4.5 - 6.2 % Final   .     Review of patient's allergies indicates:   Allergen Reactions    Darvocet a500 [propoxyphene n-acetaminophen]     Paroxetine hcl Other (See Comments)     Other reaction(s): Unknown  Other reaction(s): Unknown  paranoid    Penicillins Other (See Comments)     Other reaction(s): Anaphylaxis  Other reaction(s): Anaphylaxis  As child    Codeine Itching     Other reaction(s): Unknown  Other reaction(s): Unknown       Past Medical History:   Diagnosis Date    Anxiety     Appendicitis     Depression     Diabetes mellitus     Encounter for blood transfusion     Gallbladder & bile duct  stone with obstruction     Herpes     Sleep apnea        Family History   Problem Relation Age of Onset    Arthritis Mother     Melanoma Father     Cancer Father         melanoma    Fibromyalgia Sister     Cancer Maternal Grandfather         unknown type       Social History     Socioeconomic History    Marital status: Single   Tobacco Use    Smoking status: Every Day     Packs/day: 1.00     Years: 46.00     Pack years: 46.00     Types: Cigarettes    Smokeless tobacco: Never   Substance and Sexual Activity    Alcohol use: Yes     Alcohol/week: 2.0 standard drinks     Types: 2 Cans of beer per week    Drug use: No    Sexual activity: Yes     Partners: Female       Past Surgical History:   Procedure Laterality Date    ABDOMINAL SURGERY      APPENDECTOMY      bilateral rotator cuff surgery      BRAIN SURGERY      CERVICAL FUSION      CHOLECYSTECTOMY      COLONOSCOPY N/A 7/10/2017    Procedure: COLONOSCOPY;  Surgeon: Viviana Reyes MD;  Location: [facility];  Service: Endoscopy;  Laterality: N/A;    LIVER TRANSPLANT      LUMBAR DISC SURGERY      TONSILLECTOMY         Review of Systems       Objective:   There were no vitals taken for this visit.    Physical Exam  LOWER EXTREMITY PHYSICAL EXAMINATION    VASCULAR:  The right dorsalis pedis pulse 2/4 and the right posterior tibial pulse 2/4.  The left dorsalis pedis pulse 2/4 and posterior tibial pulse on the left is 2/4.  Capillary refill is intact.  Pedal hair growth intact    NEUROLOGY: Protective sensation is not intact to the left and right plantar surfaces of the foot and digits, as the patient has no sensation/detection at greater than 4 distinct points of contact with 5.07 [place] monofilament. Sensation to light touch is intact on the left and right foot. Proprioception is intact, bilateral. Sensation to pin prick is reduced to absent. Vibratory sensation is diminished.    DERMATOLOGY:  Skin is supple, moist, intact.  There is no signs of callusing,  ulcerations, other lesions identified to the dorsal or plantar aspect of the right or left foot.  The R1, 2, 5 and left L1,2, 5 are thickened, discolored dystrophic.  There is subungual debris.  Nail plates have area of dark discoloration.  The remaining nails 3-4 on the right foot and the left foot are elongated but of normal color, thickness, and texture.   There is no signs of ingrowing into the medial or lateral borders.  There is no evidence of wounds or skin breakdown.  No edema or erythema.  No obvious lacerations or fissuring.  Interdigital spaces are clean, dry, intact.  No rashes or scars appreciated.    ORTHOPEDIC: Manual Muscle Testing is 5/5 in all planes on the left and right, without pains, with and without resistance. Gait pattern is non-antalgic.    Assessment:     1. Type 2 diabetes mellitus with other neurologic complication, without long-term current use of insulin    2. Immunosuppression    3. Liver replaced by transplant    4. Nail overgrowth        Plan:     Type 2 diabetes mellitus with other neurologic complication, without long-term current use of insulin    Immunosuppression    Liver replaced by transplant    Nail overgrowth        Thorough discussion is had with the patient this afternoon, concerning the diagnosis, its etiology, and the treatment algorithm at present.  Greater than 50% of this visit spent on counseling and coordination of care. Greater than 15 minutes of a 20 minute appointment spent on education about the diabetic foot, neuropathy, and prevention of limb loss.  Shoe inspection. Diabetic Foot Education. Patient reminded of the importance of good nutrition and blood sugar control to help prevent podiatric complications of diabetes. Patient instructed on proper foot hygeine. We discussed wearing proper and supportive shoe gear, daily foot inspections, never walking barefooted or sock footed, never putting sharp instruments to feet which can cause major complications  associated with infection, ulcers, lacerations.      Dystrophic nail plates, as outlined above (R#1,2,5  ; L#1,2,5 ), are sharply debrided with double action nail nipper, and/or with the assistance of a mechanical rotary jyoti, with removal of all offending nail and nail border(s), for reduction of pains. Nails are reduced in terms of length, width and girth with removal of subungual debris to facilitate pain free weight bearing and ambulation. The elongated nails as outlined in the objective portion of this note, were trimmed to appropriate length, with a double action nail nipper, for alleviation/reduction of pains as well. Follow up in approx. 3-4 months.    Continue management for long-term immunosuppressive therapy per transplant team.    Defer diabetic pain medication to transplant team      Future Appointments   Date Time Provider Department Center   8/8/2023  9:00 AM Orquidea Zhou NP [facility] PULMSVC [place] [facility]   8/18/2023  9:15 AM CARDIOVASCULAR 2, [facility] SPECCPR [place] [facility]   8/18/2023 10:00 AM CARDIOVASCULAR 2, [facility] [place] [facility]   8/18/2023 11:00 AM ECHOCARDIOGRAM, [facility] [place] [facility]   8/18/2023 11:45 AM [facility] NM2 CAM1 CARDIAC [facility] NUCMED [place] [facility]   8/18/2023 12:45 PM TREADMILL, NUCLEAR MEDICINE [facility] [place] [facility]   8/18/2023  1:45 PM [facility] NM2 CAM1 CARDIAC [facility] NUCM[facility] [place] [facility]   9/25/2023  9:30 AM Viviana Plascencia MD [facility] HEPAT [place] [facility]   9/28/2023  8:50 AM LABORATORY, CENTRAL [facility]   12/4/2023 10:00 AM [facility] US3 [facility] ULSOUND [place] [facility]   1/3/2024 11:30 AM Maria T Mcgarry NP [facility] NEURO [facility]   1/19/2024  2:00 PM Scott Moncada MD [facility] CARDIO [facility]

## 2023-07-20 NOTE — PROGRESS NOTES
MRI L-Spine WO:    07-      Postsurgical changes of the L5-S1 posterior elements/spinous processes.  Left L5-S1 subarticular zone disc protrusion narrows the left lateral recess and contributes to mild-to-moderate left-sided neural foraminal stenosis.  2. Mild L4-L5 spinal canal stenosis with mild right and minor left-sided neural foraminal stenosis.      MRI Brain WO:    07-      MRI Brain NL age related changes

## 2023-07-24 ENCOUNTER — TELEPHONE (OUTPATIENT)
Dept: NEUROSURGERY | Facility: CLINIC | Age: 68
End: 2023-07-24
Payer: MEDICARE

## 2023-07-24 NOTE — TELEPHONE ENCOUNTER
Attempted to reach patient again for scheduling of NRSX appt. No answser, LVM. Called spouse's number, no VM and it stated person was not available. No Mychart set up to leave message. Three attempts have been made to reach patient for scheduling.  DANIEL

## 2023-08-01 ENCOUNTER — TELEPHONE (OUTPATIENT)
Dept: PSYCHIATRY | Facility: CLINIC | Age: 68
End: 2023-08-01
Payer: MEDICARE

## 2023-08-01 NOTE — TELEPHONE ENCOUNTER
Rt pt call back in regards to scheduling appt for therapy. Scheduled np appt with Joao Schroeder LCSW for 11/30/23. Talked with patient for a while after scheduling appt. Will follow up with patient.

## 2023-08-07 ENCOUNTER — TELEPHONE (OUTPATIENT)
Dept: NEUROSURGERY | Facility: CLINIC | Age: 68
End: 2023-08-07
Payer: MEDICARE

## 2023-08-07 NOTE — TELEPHONE ENCOUNTER
Returned patient's call for scheduling per NRSX referral. No answer, LVM. Also called sig other's phone, not a working number.  RD

## 2023-08-11 ENCOUNTER — TELEPHONE (OUTPATIENT)
Dept: NEUROSURGERY | Facility: CLINIC | Age: 68
End: 2023-08-11
Payer: MEDICARE

## 2023-08-11 NOTE — TELEPHONE ENCOUNTER
Contacted patient regarding referral with Neurosurgery. Patient states no surgeries within last 2 years, nor was pain/injury caused by any accident that would be involved in any type of future litigation.    Cherie Ontiveros RN

## 2023-08-17 ENCOUNTER — TELEPHONE (OUTPATIENT)
Dept: NEUROSURGERY | Facility: CLINIC | Age: 68
End: 2023-08-17
Payer: MEDICARE

## 2023-08-17 ENCOUNTER — TELEPHONE (OUTPATIENT)
Dept: FAMILY MEDICINE | Facility: CLINIC | Age: 68
End: 2023-08-17
Payer: MEDICARE

## 2023-08-17 NOTE — TELEPHONE ENCOUNTER
----- Message from Liv Vinson sent at 8/17/2023  8:44 AM CDT -----  Cherie Kirkland called in this morning stating she thinks the patient may need a well fare check he is not functioning well. She states he really needs some support. He stated that he needs a  or someone to help him. Please call back [phone]

## 2023-08-17 NOTE — TELEPHONE ENCOUNTER
Received call back from Dr. Escudero's nurse. Per Dr. Church, call [facility]'s office for them to go perform welfare check and possible send patient to hospital.  RD

## 2023-08-17 NOTE — TELEPHONE ENCOUNTER
----- Message from Liv Vinson sent at 8/17/2023  8:44 AM CDT -----  Cherie Kirkland called in this morning stating she thinks the patient may need a well fare check he is not functioning well. She states he really needs some support. He stated that he needs a  or someone to help him. Please call back [phone]

## 2023-08-17 NOTE — TELEPHONE ENCOUNTER
Spoke to daughter told her this is the wrong dr adam but I explained to her that she needs to call the police to do a welfare check on her father.  She voiced understanding.

## 2023-08-17 NOTE — TELEPHONE ENCOUNTER
Called [facility]'s office. Spoke to Windsorenio Vasquez. Requested welfare check for patient. Provided all pertinent information per request. [place] states that they will send someone out and give Dr. Escudero a call from there to update him on patient's status.  DANIEL

## 2023-08-17 NOTE — TELEPHONE ENCOUNTER
"Returned patient's phone call. Patient requesting cancellation of upcoming NRSX appt. Patient states he is "just existing" since the death of his mother, son, and aunt. He asked me if he should throw away his mother's ashes since he looks at them daily. I advised patient not to, he needs to wait until his psych appt and if he throws them away he will never be able to get them back. Patient also states he is out of all food. He has no desire to leave home so he does not go grocery shopping, but does say he will try to go today. He endorses having issues making decisions at this time and having "garbled thoughts." He said he tried to talk to Dr. Escudero, but felt he was dismissed. He says that Dr. Escudero said he didn't have time to talk about his problems. He states he has not cut his hair in a month or two because he just cancels. His truck his full of mail because he does not go through it, just pulls out the bills. He also states that since his phone would not accept something at the doctor's office for the [employer] blood sugar testing system, he has not gotten it and has stopped all checking of his blood sugars. He does say that he has had a  come to his house in the past and would like another visit. Laundry has not been done in weeks, he just went and bought new underwear. He says he needs help, he can't go on like this, "just existing."  Called Dr. Escudero's office, message was sent to staff. I did not cancel any of the appointments at this time. Still attempting to reach my supervisor/manager for escalation.  RD  "

## 2023-08-18 ENCOUNTER — HOSPITAL ENCOUNTER (OUTPATIENT)
Dept: CARDIOLOGY | Facility: HOSPITAL | Age: 68
Discharge: HOME | End: 2023-08-18
Attending: INTERNAL MEDICINE
Payer: MEDICARE

## 2023-08-18 ENCOUNTER — HOSPITAL ENCOUNTER (OUTPATIENT)
Dept: RADIOLOGY | Facility: HOSPITAL | Age: 68
Discharge: HOME | End: 2023-08-18
Attending: INTERNAL MEDICINE
Payer: MEDICARE

## 2023-08-18 VITALS
DIASTOLIC BLOOD PRESSURE: 71 MMHG | HEIGHT: 70 IN | WEIGHT: 232 LBS | WEIGHT: 232 LBS | SYSTOLIC BLOOD PRESSURE: 131 MMHG | HEIGHT: 70 IN | DIASTOLIC BLOOD PRESSURE: 71 MMHG | BODY MASS INDEX: 33.21 KG/M2 | BODY MASS INDEX: 33.21 KG/M2 | SYSTOLIC BLOOD PRESSURE: 131 MMHG

## 2023-08-18 VITALS
HEART RATE: 83 BPM | DIASTOLIC BLOOD PRESSURE: 80 MMHG | WEIGHT: 232 LBS | BODY MASS INDEX: 33.21 KG/M2 | SYSTOLIC BLOOD PRESSURE: 140 MMHG | HEIGHT: 70 IN

## 2023-08-18 DIAGNOSIS — R53.82 CHRONIC FATIGUE: ICD-10-CM

## 2023-08-18 DIAGNOSIS — Z94.4 LIVER REPLACED BY TRANSPLANT: ICD-10-CM

## 2023-08-18 DIAGNOSIS — I20.9 AP (ANGINA PECTORIS): ICD-10-CM

## 2023-08-18 DIAGNOSIS — R07.89 ATYPICAL CHEST PAIN: ICD-10-CM

## 2023-08-18 DIAGNOSIS — I45.10 RBBB: ICD-10-CM

## 2023-08-18 DIAGNOSIS — R06.09 DOE (DYSPNEA ON EXERTION): ICD-10-CM

## 2023-08-18 DIAGNOSIS — E11.49 TYPE 2 DIABETES MELLITUS WITH OTHER NEUROLOGIC COMPLICATION, WITHOUT LONG-TERM CURRENT USE OF INSULIN: ICD-10-CM

## 2023-08-18 DIAGNOSIS — R94.31 ABNORMAL ECG: ICD-10-CM

## 2023-08-18 DIAGNOSIS — I71.21 ANEURYSM OF ASCENDING AORTA WITHOUT RUPTURE: ICD-10-CM

## 2023-08-18 DIAGNOSIS — I73.9 PAD (PERIPHERAL ARTERY DISEASE): ICD-10-CM

## 2023-08-18 DIAGNOSIS — Z72.0 TOBACCO ABUSE: ICD-10-CM

## 2023-08-18 DIAGNOSIS — I10 ESSENTIAL HYPERTENSION: ICD-10-CM

## 2023-08-18 LAB
AORTIC ROOT ANNULUS: 3.96 CM
ASCENDING AORTA: 4.14 CM
AV INDEX (PROSTH): 0.84
AV MEAN GRADIENT: 4 MMHG
AV PEAK GRADIENT: 7 MMHG
AV VALVE AREA BY VELOCITY RATIO: 3.6 CM²
AV VALVE AREA: 3.44 CM²
AV VELOCITY RATIO: 0.88
BSA FOR ECHO PROCEDURE: 2.28 M2
CV ECHO LV RWT: 0.44 CM
CV STRESS BASE HR: 81 BPM
DIASTOLIC BLOOD PRESSURE: 57 MMHG
DOP CALC AO PEAK VEL: 1.36 M/S
DOP CALC AO VTI: 27.4 CM
DOP CALC LVOT AREA: 4.1 CM2
DOP CALC LVOT DIAMETER: 2.29 CM
DOP CALC LVOT PEAK VEL: 1.19 M/S
DOP CALC LVOT STROKE VOLUME: 94.27 CM3
DOP CALC RVOT PEAK VEL: 0.88 M/S
DOP CALC RVOT VTI: 16.6 CM
DOP CALCLVOT PEAK VEL VTI: 22.9 CM
E WAVE DECELERATION TIME: 226.02 MSEC
E/A RATIO: 0.63
E/E' RATIO: 8.67 M/S
ECHO LV POSTERIOR WALL: 1.09 CM (ref 0.6–1.1)
FRACTIONAL SHORTENING: 37 % (ref 28–44)
INTERVENTRICULAR SEPTUM: 1.26 CM (ref 0.6–1.1)
IVC DIAMETER: 1.91 CM
IVRT: 106.57 MSEC
LA MAJOR: 4.88 CM
LA MINOR: 5.06 CM
LA WIDTH: 2.9 CM
LEFT ABI: 1.08
LEFT ANT TIBIAL SYS PSV: 29 CM/S
LEFT ARM BP: 131 MMHG
LEFT ATRIUM SIZE: 3.1 CM
LEFT ATRIUM VOLUME INDEX MOD: 17.6 ML/M2
LEFT ATRIUM VOLUME INDEX: 17.1 ML/M2
LEFT ATRIUM VOLUME MOD: 39.02 CM3
LEFT ATRIUM VOLUME: 37.97 CM3
LEFT CFA PSV: 100 CM/S
LEFT DORSALIS PEDIS: 138 MMHG
LEFT INTERNAL DIMENSION IN SYSTOLE: 3.13 CM (ref 2.1–4)
LEFT PERONEAL SYS PSV: 28 CM/S
LEFT POPLITEAL PSV: 58 CM/S
LEFT POST TIBIAL SYS PSV: 83 CM/S
LEFT POSTERIOR TIBIAL: 141 MMHG
LEFT PROFUNDA SYS PSV: 99 CM/S
LEFT SUPER FEMORAL DIST SYS PSV: 67 CM/S
LEFT SUPER FEMORAL MID SYS PSV: 120 CM/S
LEFT SUPER FEMORAL OSTIAL SYS PSV: 76 CM/S
LEFT SUPER FEMORAL PROX SYS PSV: 86 CM/S
LEFT TBI: 1.18
LEFT TIB/PER TRUNK SYS PSV: 88 CM/S
LEFT TOE PRESSURE: 154 MMHG
LEFT VENTRICLE DIASTOLIC VOLUME INDEX: 51.78 ML/M2
LEFT VENTRICLE DIASTOLIC VOLUME: 114.95 ML
LEFT VENTRICLE MASS INDEX: 100 G/M2
LEFT VENTRICLE SYSTOLIC VOLUME INDEX: 17.6 ML/M2
LEFT VENTRICLE SYSTOLIC VOLUME: 38.97 ML
LEFT VENTRICULAR INTERNAL DIMENSION IN DIASTOLE: 4.94 CM (ref 3.5–6)
LEFT VENTRICULAR MASS: 222.63 G
LV LATERAL E/E' RATIO: 7.22 M/S
LV SEPTAL E/E' RATIO: 10.83 M/S
LVOT MG: 3.29 MMHG
LVOT MV: 0.86 CM/S
MV PEAK A VEL: 1.03 M/S
MV PEAK E VEL: 0.65 M/S
MV STENOSIS PRESSURE HALF TIME: 65.54 MS
MV VALVE AREA P 1/2 METHOD: 3.36 CM2
NUC REST EJECTION FRACTION: 57
NUC STRESS EJECTION FRACTION: 66 %
OHS CV CPX 1 MINUTE RECOVERY HEART RATE: 99 BPM
OHS CV CPX 85 PERCENT MAX PREDICTED HEART RATE MALE: 128
OHS CV CPX ESTIMATED METS: 2
OHS CV CPX MAX PREDICTED HEART RATE: 151
OHS CV CPX PATIENT IS FEMALE: 0
OHS CV CPX PATIENT IS MALE: 1
OHS CV CPX PEAK DIASTOLIC BLOOD PRESSURE: 74 MMHG
OHS CV CPX PEAK HEAR RATE: 133 BPM
OHS CV CPX PEAK RATE PRESSURE PRODUCT: NORMAL
OHS CV CPX PEAK SYSTOLIC BLOOD PRESSURE: 174 MMHG
OHS CV CPX PERCENT MAX PREDICTED HEART RATE ACHIEVED: 88
OHS CV CPX RATE PRESSURE PRODUCT PRESENTING: 9396
OHS CV LEFT LOWER EXTREMITY ABI (NO CALC): 1.08
OHS CV RIGHT ABI LOWER EXTREMITY (NO CALC): 0.66
PISA TR MAX VEL: 2.4 M/S
PULM VEIN S/D RATIO: 1.14
PV MEAN GRADIENT: 2 MMHG
PV MV: 0.59 M/S
PV PEAK D VEL: 0.42 M/S
PV PEAK GRADIENT: 3 MMHG
PV PEAK S VEL: 0.48 M/S
PV PEAK VELOCITY: 0.83 M/S
RA MAJOR: 3.94 CM
RA PRESSURE ESTIMATED: 8 MMHG
RA WIDTH: 3.49 CM
RIGHT ABI: 0.66
RIGHT ANT TIBIAL SYS PSV: 45 CM/S
RIGHT ARM BP: 125 MMHG
RIGHT CFA PSV: 97 CM/S
RIGHT DORSALIS PEDIS: 84 MMHG
RIGHT PERONEAL SYS PSV: 20 CM/S
RIGHT POPLITEAL PSV: 180 CM/S
RIGHT POST TIBIAL SYS PSV: 28 CM/S
RIGHT POSTERIOR TIBIAL: 86 MMHG
RIGHT PROFUNDA SYS PSV: 84 CM/S
RIGHT SUPER FEMORAL DIST SYS PSV: 344 CM/S
RIGHT SUPER FEMORAL MID SYS PSV: 86 CM/S
RIGHT SUPER FEMORAL OSTIAL SYS PSV: 52 CM/S
RIGHT SUPER FEMORAL PROX SYS PSV: 113 CM/S
RIGHT TBI: 0.64
RIGHT TIB/PER TRUNK SYS PSV: 29 CM/S
RIGHT TOE PRESSURE: 84 MMHG
RIGHT VENTRICULAR END-DIASTOLIC DIMENSION: 2.82 CM
RV TB RVSP: 10 MMHG
SINUS: 3.84 CM
STJ: 3.22 CM
STRESS ECHO POST EXERCISE DUR MIN: 5 MINUTES
STRESS ECHO POST EXERCISE DUR SEC: 6 SECONDS
SYSTOLIC BLOOD PRESSURE: 116 MMHG
TDI LATERAL: 0.09 M/S
TDI SEPTAL: 0.06 M/S
TDI: 0.08 M/S
TR MAX PG: 23 MMHG
TRICUSPID ANNULAR PLANE SYSTOLIC EXCURSION: 2.1 CM
TV REST PULMONARY ARTERY PRESSURE: 31 MMHG
Z-SCORE OF LEFT VENTRICULAR DIMENSION IN END DIASTOLE: -4.51
Z-SCORE OF LEFT VENTRICULAR DIMENSION IN END SYSTOLE: -3.22

## 2023-08-18 PROCEDURE — 78452 HT MUSCLE IMAGE SPECT MULT: CPT | Mod: 26,,, | Performed by: INTERNAL MEDICINE

## 2023-08-18 PROCEDURE — A9502 TC99M TETROFOSMIN: HCPCS

## 2023-08-18 PROCEDURE — 63600175 PHARM REV CODE 636 W HCPCS: Performed by: INTERNAL MEDICINE

## 2023-08-18 PROCEDURE — 93306 TTE W/DOPPLER COMPLETE: CPT

## 2023-08-18 PROCEDURE — 93016 CV STRESS TEST SUPVJ ONLY: CPT | Mod: ,,, | Performed by: INTERNAL MEDICINE

## 2023-08-18 PROCEDURE — 93016 NUCLEAR STRESS - CARDIOLOGY INTERPRETED (CUPID ONLY): ICD-10-PCS | Mod: ,,, | Performed by: INTERNAL MEDICINE

## 2023-08-18 PROCEDURE — 93922 UPR/L XTREMITY ART 2 LEVELS: CPT

## 2023-08-18 PROCEDURE — 93925 LOWER EXTREMITY STUDY: CPT

## 2023-08-18 PROCEDURE — 93017 CV STRESS TEST TRACING ONLY: CPT

## 2023-08-18 PROCEDURE — 93922 ANKLE BRACHIAL INDICES (ABI): ICD-10-PCS | Mod: 26,,, | Performed by: INTERNAL MEDICINE

## 2023-08-18 PROCEDURE — 93018 CV STRESS TEST I&R ONLY: CPT | Mod: ,,, | Performed by: INTERNAL MEDICINE

## 2023-08-18 PROCEDURE — 93306 TTE W/DOPPLER COMPLETE: CPT | Mod: 26,,, | Performed by: INTERNAL MEDICINE

## 2023-08-18 PROCEDURE — 93925 LOWER EXTREMITY STUDY: CPT | Mod: 26,,, | Performed by: INTERNAL MEDICINE

## 2023-08-18 PROCEDURE — 93306 ECHO (CUPID ONLY): ICD-10-PCS | Mod: 26,,, | Performed by: INTERNAL MEDICINE

## 2023-08-18 PROCEDURE — 93922 UPR/L XTREMITY ART 2 LEVELS: CPT | Mod: 26,,, | Performed by: INTERNAL MEDICINE

## 2023-08-18 PROCEDURE — 78452 HT MUSCLE IMAGE SPECT MULT: CPT

## 2023-08-18 PROCEDURE — 93018 NUCLEAR STRESS - CARDIOLOGY INTERPRETED (CUPID ONLY): ICD-10-PCS | Mod: ,,, | Performed by: INTERNAL MEDICINE

## 2023-08-18 PROCEDURE — 93925 CV US DOPPLER ARTERIAL LEGS BILATERAL (CUPID ONLY): ICD-10-PCS | Mod: 26,,, | Performed by: INTERNAL MEDICINE

## 2023-08-18 PROCEDURE — 78452 NUCLEAR STRESS - CARDIOLOGY INTERPRETED (CUPID ONLY): ICD-10-PCS | Mod: 26,,, | Performed by: INTERNAL MEDICINE

## 2023-08-18 RX ORDER — REGADENOSON 0.08 MG/ML
0.4 INJECTION, SOLUTION INTRAVENOUS ONCE
Status: COMPLETED | OUTPATIENT
Start: 2023-08-18 | End: 2023-08-18

## 2023-08-18 RX ADMIN — REGADENOSON 0.4 MG: 0.08 INJECTION, SOLUTION INTRAVENOUS at 12:08

## 2023-08-23 ENCOUNTER — TELEPHONE (OUTPATIENT)
Dept: CARDIOLOGY | Facility: HOSPITAL | Age: 68
End: 2023-08-23
Payer: MEDICARE

## 2023-08-23 NOTE — TELEPHONE ENCOUNTER
Please call pt.  He passed his stress test.  Normal heart strength.    PAD noted on vasc studies.  I would like him to return for f/u visit to discuss his PAD and go over all test results.    See me in next 6 weeks.    Thanks    Dr Moncada

## 2023-08-24 ENCOUNTER — OFFICE VISIT (OUTPATIENT)
Dept: NEUROSURGERY | Facility: CLINIC | Age: 68
End: 2023-08-24
Payer: MEDICARE

## 2023-08-24 VITALS
HEIGHT: 70 IN | SYSTOLIC BLOOD PRESSURE: 108 MMHG | DIASTOLIC BLOOD PRESSURE: 63 MMHG | HEART RATE: 98 BPM | BODY MASS INDEX: 33.11 KG/M2 | WEIGHT: 231.25 LBS

## 2023-08-24 DIAGNOSIS — M54.16 LUMBAR RADICULOPATHY: Primary | ICD-10-CM

## 2023-08-24 DIAGNOSIS — M51.36 DDD (DEGENERATIVE DISC DISEASE), LUMBAR: Primary | ICD-10-CM

## 2023-08-24 DIAGNOSIS — Z98.890 HISTORY OF BACK SURGERY: ICD-10-CM

## 2023-08-24 DIAGNOSIS — M54.16 LUMBAR RADICULOPATHY: ICD-10-CM

## 2023-08-24 PROCEDURE — 1101F PR PT FALLS ASSESS DOC 0-1 FALLS W/OUT INJ PAST YR: ICD-10-PCS | Mod: CPTII,S$GLB,, | Performed by: PHYSICIAN ASSISTANT

## 2023-08-24 PROCEDURE — 1125F AMNT PAIN NOTED PAIN PRSNT: CPT | Mod: CPTII,S$GLB,, | Performed by: PHYSICIAN ASSISTANT

## 2023-08-24 PROCEDURE — 1159F MED LIST DOCD IN RCRD: CPT | Mod: CPTII,S$GLB,, | Performed by: PHYSICIAN ASSISTANT

## 2023-08-24 PROCEDURE — 3008F BODY MASS INDEX DOCD: CPT | Mod: CPTII,S$GLB,, | Performed by: PHYSICIAN ASSISTANT

## 2023-08-24 PROCEDURE — 4010F PR ACE/ARB THEARPY RXD/TAKEN: ICD-10-PCS | Mod: CPTII,S$GLB,, | Performed by: PHYSICIAN ASSISTANT

## 2023-08-24 PROCEDURE — 3288F FALL RISK ASSESSMENT DOCD: CPT | Mod: CPTII,S$GLB,, | Performed by: PHYSICIAN ASSISTANT

## 2023-08-24 PROCEDURE — 3288F PR FALLS RISK ASSESSMENT DOCUMENTED: ICD-10-PCS | Mod: CPTII,S$GLB,, | Performed by: PHYSICIAN ASSISTANT

## 2023-08-24 PROCEDURE — 4010F ACE/ARB THERAPY RXD/TAKEN: CPT | Mod: CPTII,S$GLB,, | Performed by: PHYSICIAN ASSISTANT

## 2023-08-24 PROCEDURE — 99203 OFFICE O/P NEW LOW 30 MIN: CPT | Mod: S$GLB,,, | Performed by: PHYSICIAN ASSISTANT

## 2023-08-24 PROCEDURE — 3078F PR MOST RECENT DIASTOLIC BLOOD PRESSURE < 80 MM HG: ICD-10-PCS | Mod: CPTII,S$GLB,, | Performed by: PHYSICIAN ASSISTANT

## 2023-08-24 PROCEDURE — 1159F PR MEDICATION LIST DOCUMENTED IN MEDICAL RECORD: ICD-10-PCS | Mod: CPTII,S$GLB,, | Performed by: PHYSICIAN ASSISTANT

## 2023-08-24 PROCEDURE — 99999 PR PBB SHADOW E&M-EST. PATIENT-LVL III: ICD-10-PCS | Mod: PBBFAC,,, | Performed by: PHYSICIAN ASSISTANT

## 2023-08-24 PROCEDURE — 1101F PT FALLS ASSESS-DOCD LE1/YR: CPT | Mod: CPTII,S$GLB,, | Performed by: PHYSICIAN ASSISTANT

## 2023-08-24 PROCEDURE — 3008F PR BODY MASS INDEX (BMI) DOCUMENTED: ICD-10-PCS | Mod: CPTII,S$GLB,, | Performed by: PHYSICIAN ASSISTANT

## 2023-08-24 PROCEDURE — 99999 PR PBB SHADOW E&M-EST. PATIENT-LVL III: CPT | Mod: PBBFAC,,, | Performed by: PHYSICIAN ASSISTANT

## 2023-08-24 PROCEDURE — 3074F SYST BP LT 130 MM HG: CPT | Mod: CPTII,S$GLB,, | Performed by: PHYSICIAN ASSISTANT

## 2023-08-24 PROCEDURE — 3074F PR MOST RECENT SYSTOLIC BLOOD PRESSURE < 130 MM HG: ICD-10-PCS | Mod: CPTII,S$GLB,, | Performed by: PHYSICIAN ASSISTANT

## 2023-08-24 PROCEDURE — 3078F DIAST BP <80 MM HG: CPT | Mod: CPTII,S$GLB,, | Performed by: PHYSICIAN ASSISTANT

## 2023-08-24 PROCEDURE — 99203 PR OFFICE/OUTPT VISIT, NEW, LEVL III, 30-44 MIN: ICD-10-PCS | Mod: S$GLB,,, | Performed by: PHYSICIAN ASSISTANT

## 2023-08-24 PROCEDURE — 1125F PR PAIN SEVERITY QUANTIFIED, PAIN PRESENT: ICD-10-PCS | Mod: CPTII,S$GLB,, | Performed by: PHYSICIAN ASSISTANT

## 2023-08-24 NOTE — PROGRESS NOTES
"Subjective:      Patient ID: Chapo Jacobo is a 67 y.o. male.    HPI  The patient is here today for evaluation of lumbar spine pain.  He is referred by NP Maria T Mcgarry.  The patient has back and leg pain. Reports leg pain is worse compared to his back.  Patient has noticed more back pain recently.  If he walks he has leg pain- from knee down to foot.  RLE=LLE  Also has pain in the muscles in each calf.   Denies numbness/tingling and weakness.    A few summers ago, a tree fell into his neighbor's yard. After cutting tree and bringing into a burn pile he had cramping and severe pain in the R thigh. He could see the muscles twitching.   That night he went to the ER and was told he was dehydrated. Patient was given morphine to help.  Now he uses blue emu to help with the cramps.    Has a cane to use when needed.  Only uses blue emu cream.  Rates his pain 7/10 today.    Since gallbladder surgery he has issues after eating (having to go quickly to the restroom). Takes Imodium prior to meals.  Has issues urinating at night- states he has to mash "on it in order to go." Takes Flomax.    Spine surgery:  Previous back surgery L5/S1- performed by Dr. Michelet Betts 10-15 yrs ago.  Also Previous ACDF by Dr. Betts 10 yrs ago.    Liver transplant 12/26/2020  Objective:     Body mass index is 33.18 kg/m².  Vitals:    08/24/23 1214   BP: 108/63   Pulse: 98        Back:  + scar (lower lumbar)  None  Paraspinal muscle spasms   None  Pain with flexion and extention   WNL  Range of motion    Neg left Straight leg raise     Motor   Right Right Left Left  Level Group   5  5  L2 Hip flexor (Psoas)   5 4+ 5 4+ L3 Leg extension (Quads)   5  5  L4 Dorsiflexion & foot inversion (Tibialis Anterior)   5  5  L5 Great toe extension ( EHL)   5  5  S1 Foot eversion (Gastroc, PL & PB)     Sensation  NL Decreased (R/L/BL) Level Sensation    X  L2 Anterio-medial thigh   X  L3 Medial thigh around knee   X  L4 Medial foot   X  L5 Dorsum foot   X  " S1 Lateral foot     Reflex  2+  Patellar tendon (L4)   2+  Achilles tendon (S1)       Results for orders placed during the hospital encounter of 07/19/23    MRI Lumbar Spine Without Contrast  FINDINGS:  Alignment: Within normal limits.  Vertebrae: Lumbar vertebral body heights are maintained.  Postsurgical changes of the posterior elements at L5-S1.  There are minor multilevel endplate degenerative changes with tiny edematous superior L3 Schmorl's nodes.  Additional tiny superior L2 edematous Schmorl's node.  No evidence of acute fracture.  Marrow signal is otherwise within normal limits.  Discs: Disc desiccation throughout the lumbar spine with L5-S1 disc height loss.  Cord: Within normal limits.  Conus terminates at L1.    Degenerative findings:    T12-L1: No significant posterior disc bulge.  Right anterolateral disc osteophyte complex.  No spinal canal stenosis or neural foraminal stenosis.    L1-L2: Minor broad-based posterior disc bulge.  No significant spinal canal stenosis or neural foraminal stenosis.    L2-L3: Minor broad-based posterior disc bulge and mild bilateral facet arthropathy.  No significant spinal canal stenosis or neural foraminal stenosis.    L3-L4: Minor broad-based disc bulge and mild bilateral facet arthropathy.  No significant spinal canal stenosis or neural foraminal stenosis.    L4-L5: Minor broad-based disc bulge is asymmetric to the left.  Bilateral facet arthropathy with ligamentum flavum hypertrophy narrows the right rater than left lateral recess and contributes to mild spinal canal stenosis.  There is mild right and minor left-sided neural foraminal stenosis.    L5-S1: Postsurgical changes of the spinous processes.  Minor broad-based disc bulge with small left subarticular zone disc protrusion.  Mild bilateral facet arthropathy.  Narrowing of the left lateral recess without significant spinal canal stenosis.  Mild-to-moderate left-sided neural foraminal stenosis.    Paraspinal  muscles & soft tissues: Within normal limits.    Impression  1. Postsurgical changes of the L5-S1 posterior elements/spinous processes.  Left L5-S1 subarticular zone disc protrusion narrows the left lateral recess and contributes to mild-to-moderate left-sided neural foraminal stenosis.  2. Mild L4-L5 spinal canal stenosis with mild right and minor left-sided neural foraminal stenosis.      Lab Results   Component Value Date    WBC 7.85 06/28/2023    HCT 51.3 06/28/2023     INDEPENDENT INTERPRETATION OF TEST:  Relevant imaging results reviewed and interpreted by me, discussed with the patient and / or family today.  Assessment:     1. DDD (degenerative disc disease), lumbar    2. History of back surgery    3. Lumbar radiculopathy      Plan:     DDD (degenerative disc disease), lumbar  -     Procedure Order to Pain Management; Future; Expected date: 08/24/2023    History of back surgery  -     Procedure Order to Pain Management; Future; Expected date: 08/24/2023    Lumbar radiculopathy  -     Procedure Order to Pain Management; Future; Expected date: 08/24/2023        Discussed MRI findings of lumbar spine, including pathology and treatment options.  Patient made aware of multi-level degenerative changes, worse at L4/5 and L5/S1.   Will recommend procedure with PM at this time.  - orders placed for TFESI at L4/5.  Follow up after injection if symptoms do not improve. Consider EMG and additional imaging studies at next visit.    Cheyenne Izquierdo PA-C  [facility]

## 2023-08-24 NOTE — TELEPHONE ENCOUNTER
Please call pt.  He passed his stress test.  Normal heart strength.    PAD noted on vasc studies.  I would like him to return for f/u visit to discuss his PAD and go over all test results.    See me in next 6 weeks.    Thanks    Dr Moncada    Called patient to advise per Dr. Moncada     Spoke to patient verbalized understanding 6 week follow up scheduled

## 2023-08-24 NOTE — PATIENT INSTRUCTIONS
- patient with postnasal drip  - will start Zyrtec 10mg qd, Rx sent to pharmacy  - will start Flonase nasal spray, apply 2 sprays to each nostril daily. Rx sent to pharmacy   Follow up after injections.    If no improvement, will recommend EMG of lower extremities and add'l imaging as necessary.

## 2023-08-25 ENCOUNTER — TELEPHONE (OUTPATIENT)
Dept: PAIN MEDICINE | Facility: CLINIC | Age: 68
End: 2023-08-25
Payer: MEDICARE

## 2023-08-25 NOTE — TELEPHONE ENCOUNTER
Call to schedule pt procedure with Dr Read, pt states he will call back on 8/28/23 to schedule procedure.  .Arian Roca [facility]

## 2023-08-25 NOTE — TELEPHONE ENCOUNTER
----- Message from Bill Read MD sent at 8/24/2023  2:08 PM CDT -----  Pain Provider: Jacek  Patient Name: Chapo Jacobo  MRN: [id number]  Case: LUMBAR TFESI  Level: L4/5  Laterality: bilateral    Follow up with Neurosurgery 3 weeks after procedure

## 2023-09-12 NOTE — PRE-PROCEDURE INSTRUCTIONS
Spoke with patient regarding procedure scheduled on 9.18     Arrival time 0945     Has patient been sick with fever or on antibiotics within the last 7 days? No     Does the patient have any open wounds, sores or rashes? No     Does the patient have any recent fractures? no     Has patient received a vaccination within the last 7 days? No     Received the COVID vaccination? yes     Has the patient stopped all medications as directed? na     Does patient have a pacemaker, defibrillator, or implantable stimulator? no     Does the patient have a ride to and from procedure and someone reliable to remain with patient? sister     Is the patient diabetic? yes     Does the patient have sleep apnea? Or use O2 at home? ce      Is the patient receiving sedation? yes     Is the patient instructed to remain NPO beginning at midnight the night before their procedure? yes     Procedure location confirmed with patient? Yes     Covid- Denies signs/symptoms. Instructed to notify PAT/MD if any changes.

## 2023-09-18 ENCOUNTER — HOSPITAL ENCOUNTER (OUTPATIENT)
Facility: HOSPITAL | Age: 68
Discharge: HOME | End: 2023-09-18
Attending: ANESTHESIOLOGY | Admitting: ANESTHESIOLOGY
Payer: MEDICARE

## 2023-09-18 VITALS
RESPIRATION RATE: 16 BRPM | TEMPERATURE: 98 F | SYSTOLIC BLOOD PRESSURE: 143 MMHG | HEART RATE: 93 BPM | OXYGEN SATURATION: 95 % | WEIGHT: 227.19 LBS | BODY MASS INDEX: 32.52 KG/M2 | DIASTOLIC BLOOD PRESSURE: 67 MMHG | HEIGHT: 70 IN

## 2023-09-18 DIAGNOSIS — M54.16 LUMBAR RADICULOPATHY: Primary | ICD-10-CM

## 2023-09-18 LAB — POCT GLUCOSE: 256 MG/DL (ref 70–110)

## 2023-09-18 PROCEDURE — 64483 PR EPIDURAL INJ, ANES/STEROID, TRANSFORAMINAL, LUMB/SACR, SNGL LEVL: ICD-10-PCS | Mod: 50,,, | Performed by: ANESTHESIOLOGY

## 2023-09-18 PROCEDURE — 64483 NJX AA&/STRD TFRM EPI L/S 1: CPT | Mod: 50 | Performed by: ANESTHESIOLOGY

## 2023-09-18 PROCEDURE — 25500020 PHARM REV CODE 255: Performed by: ANESTHESIOLOGY

## 2023-09-18 PROCEDURE — 63600175 PHARM REV CODE 636 W HCPCS: Performed by: ANESTHESIOLOGY

## 2023-09-18 PROCEDURE — 25000003 PHARM REV CODE 250: Performed by: ANESTHESIOLOGY

## 2023-09-18 PROCEDURE — 64483 NJX AA&/STRD TFRM EPI L/S 1: CPT | Mod: 50,,, | Performed by: ANESTHESIOLOGY

## 2023-09-18 RX ORDER — LIDOCAINE HYDROCHLORIDE 10 MG/ML
INJECTION, SOLUTION EPIDURAL; INFILTRATION; INTRACAUDAL; PERINEURAL
Status: DISCONTINUED | OUTPATIENT
Start: 2023-09-18 | End: 2023-09-18 | Stop reason: HOSPADM

## 2023-09-18 RX ORDER — ONDANSETRON 2 MG/ML
4 INJECTION INTRAMUSCULAR; INTRAVENOUS ONCE AS NEEDED
Status: DISCONTINUED | OUTPATIENT
Start: 2023-09-18 | End: 2023-09-18 | Stop reason: HOSPADM

## 2023-09-18 RX ORDER — MIDAZOLAM HYDROCHLORIDE 1 MG/ML
INJECTION, SOLUTION INTRAMUSCULAR; INTRAVENOUS
Status: DISCONTINUED | OUTPATIENT
Start: 2023-09-18 | End: 2023-09-18 | Stop reason: HOSPADM

## 2023-09-18 RX ORDER — FENTANYL CITRATE 50 UG/ML
INJECTION, SOLUTION INTRAMUSCULAR; INTRAVENOUS
Status: DISCONTINUED | OUTPATIENT
Start: 2023-09-18 | End: 2023-09-18 | Stop reason: HOSPADM

## 2023-09-18 RX ORDER — BETAMETHASONE SODIUM PHOSPHATE AND BETAMETHASONE ACETATE 3; 3 MG/ML; MG/ML
INJECTION, SUSPENSION INTRA-ARTICULAR; INTRALESIONAL; INTRAMUSCULAR; SOFT TISSUE
Status: DISCONTINUED | OUTPATIENT
Start: 2023-09-18 | End: 2023-09-18 | Stop reason: HOSPADM

## 2023-09-18 NOTE — DISCHARGE SUMMARY
Discharge Note  Short Stay      SUMMARY     Admit Date: 9/18/2023    Attending Physician: Bill Read MD        Discharge Physician: Bill Read MD        Discharge Date: 9/18/2023 11:07 AM    Procedure(s) (LRB):  Bilateral L4/5 TF JULIUS (Bilateral)    Final Diagnosis: Lumbar radiculopathy [M54.16]    Disposition: Home or self care    Patient Instructions:   Current Discharge Medication List        CONTINUE these medications which have NOT CHANGED    Details   dextroamphetamine-amphetamine 30 mg Tab Take 1 tablet by mouth as needed.       glimepiride (AMARYL) 4 MG tablet Take 1 tablet by mouth as needed.       isosorbide mononitrate (IMDUR) 30 MG 24 hr tablet Take 1 tablet (30 mg total) by mouth once daily.  Qty: 30 tablet, Refills: 11    Associated Diagnoses: AP (angina pectoris)      levocetirizine (XYZAL) 5 MG tablet Take 5 mg by mouth every evening.      losartan (COZAAR) 50 MG tablet Take 50 mg by mouth once daily.      metformin (GLUCOPHAGE) 1000 MG tablet Take 1 tablet by mouth Daily.      tacrolimus (PROGRAF) 1 MG Cap Take 2 capsules (2 mg total) by mouth every 12 (twelve) hours.  Qty: 360 capsule, Refills: 3    Comments: Txp Date:12/29/2000 (Liver) Disch Date:1/9/2001 ICD10:Z94.4 Txp Location:[facility]  Associated Diagnoses: Immunosuppression; Liver transplanted      alprazolam (XANAX) 1 MG tablet Take 1 mg by mouth 2 (two) times daily. Patient takes 1 tablet twice daily  Refills: 1      amitriptyline (ELAVIL) 25 MG tablet Take 1 tablet (25 mg total) by mouth every evening.  Qty: 30 tablet, Refills: 11      cyclobenzaprine (FLEXERIL) 10 MG tablet Take 0.5 tablets (5 mg total) by mouth 3 (three) times daily as needed for Muscle spasms.  Qty: 15 tablet, Refills: 0      FLUoxetine 20 MG capsule Take 4 capsules by mouth Daily.       hydrocortisone 2.5 % cream Refills: 0      nitroGLYCERIN (NITROSTAT) 0.4 MG SL tablet Place 1 tablet (0.4 mg total) under the tongue every 5 (five) minutes as needed for Chest  pain.  Qty: 30 tablet, Refills: 12    Associated Diagnoses: Atypical chest pain      tamsulosin (FLOMAX) 0.4 mg Cap Take 1 capsule (0.4 mg total) by mouth once daily.  Qty: 30 capsule, Refills: 0      valacyclovir (VALTREX) 500 MG tablet Take 500 mg by mouth once daily.                  Discharge Diagnosis: Lumbar radiculopathy [M54.16]  Condition on Discharge: Stable with no complications to procedure   Diet on Discharge: Same as before.  Activity: as per instruction sheet.  Discharge to: Home with a responsible adult.  Follow up: 2-4 weeks       Please call the office at [phone] if you experience any weakness or loss of sensation, fever > 101.5, pain uncontrolled with oral medications, persistent nausea/vomiting/or diarrhea, redness or drainage from the incisions, or any other worrisome concerns. If physician on call was not reached or could not communicate with our office for any reason please go to the nearest emergency department

## 2023-09-18 NOTE — OP NOTE
Transforaminal Lumbar Epidural Steroid Injection    INFORMED CONSENT: The procedure, risks, benefits and options were discussed with patient. There are no contraindications to the procedure. The patient expressed understanding and agreed to proceed. The personnel performing the procedure was discussed.    Date of procedure 09/18/2023    Time-out taken to identify patient and procedure side prior to starting the procedure.                     PROCEDURE:    1)  Bilateral  L4/L5 TRANSFORAMINAL EPIDURAL STEROID INJECTION      Pre Procedure diagnosis:    Lumbar radiculopathy [M54.16]  1. Lumbar radiculopathy        Post-Procedure diagnosis:   same    Surgeon: Bill Read MD    Assistants: None    Medication: 2ml Betamethasone PF 6mg/ml and 2ml Lidocaine PF 1%    Local: 3ml Lidocaine PF 1%    Sedation: Conscious sedation provided by M.D    SEDATION MEDICATIONS: local/IV sedation: Versed 2 mg and fentanyl 50 mcg IV.  Conscious sedation ordered by MD.  Patient reevaluated and sedation administered by MD and monitored by RN.  Total sedation time was less than 10 min.    Total sedation time was <10 min    Complications: None    Specimens: None        TECHNIQUE: The patient was brought to the procedure room. IV access was obtained prior to the procedure. The patient was positioned prone on the fluoroscopy table. Continuous hemodynamic monitoring was initiated including blood pressure, EKG, and pulse oximetry. . The skin was prepped with chlorhexidine and draped in a sterile fashion.   Local Xylocaine was injected by raising a wheel and going down to the periosteum using a 27-gauge hypodermic needle.      The bilateral L4/L5 transforaminal spaces were identified with fluoroscopy in the  AP, oblique, and lateral views.  A 22 gauge spinal quinke needle was then advanced into the area of the trans foraminal spaces at the above levels with confirmation of proper needle position using AP, oblique, and lateral fluoroscopic  views. Once the needle tip was in the area of the transforaminal space, and there was no blood, CSF or paraesthesias,  2 mL of Omnipaque 300mg/ml was injected at each level for a total of 4 mL.  Fluoroscopic imaging in the AP and lateral views revealed a clear outline of the spinal nerve with proximal spread of agent through the neural foramen into the epidural space. A total combination of 1 mL of Lidocaine PF 1% and 1ml of Betamethasone PF 6mg/ml was injected into each level for a total of 4mL of injected medications with displacement of the contrast dye confirming that the medication went into the area of the transforaminal spaces at each level. A sterile dressing was applied. Patient tolerated procedure well.        The patient was monitored for approximately 30 minutes after the procedure.  Patient was given post procedure and discharge instructions to follow at home.  The patient was discharged in a stable condition

## 2023-09-18 NOTE — H&P
HPI  Patient presenting for Procedure(s) (LRB):  Bilateral L4/5 TF JULIUS (Bilateral)     Patient on Anti-coagulation No    No health changes since previous encounter    Past Medical History:   Diagnosis Date    Anxiety     Appendicitis     Depression     Diabetes mellitus     Encounter for blood transfusion     Gallbladder & bile duct stone with obstruction     Herpes     Sleep apnea      Past Surgical History:   Procedure Laterality Date    ABDOMINAL SURGERY      APPENDECTOMY      bilateral rotator cuff surgery      BRAIN SURGERY      CERVICAL FUSION      CHOLECYSTECTOMY      COLONOSCOPY N/A 7/10/2017    Procedure: COLONOSCOPY;  Surgeon: Viviana Reyes MD;  Location: [facility];  Service: Endoscopy;  Laterality: N/A;    LIVER TRANSPLANT      LUMBAR DISC SURGERY      TONSILLECTOMY       Review of patient's allergies indicates:   Allergen Reactions    Darvocet a500 [propoxyphene n-acetaminophen]     Paroxetine hcl Other (See Comments)     Other reaction(s): Unknown  Other reaction(s): Unknown  paranoid    Penicillins Other (See Comments)     Other reaction(s): Anaphylaxis  Other reaction(s): Anaphylaxis  As child    Codeine Itching     Other reaction(s): Unknown  Other reaction(s): Unknown        No current facility-administered medications on file prior to encounter.     Current Outpatient Medications on File Prior to Encounter   Medication Sig Dispense Refill    alprazolam (XANAX) 1 MG tablet Take 1 mg by mouth 2 (two) times daily. Patient takes 1 tablet twice daily  1    amitriptyline (ELAVIL) 25 MG tablet Take 1 tablet (25 mg total) by mouth every evening. 30 tablet 11    cyclobenzaprine (FLEXERIL) 10 MG tablet Take 0.5 tablets (5 mg total) by mouth 3 (three) times daily as needed for Muscle spasms. 15 tablet 0    dextroamphetamine-amphetamine 30 mg Tab Take 1 tablet by mouth as needed.       FLUoxetine 20 MG capsule Take 4 capsules by mouth Daily.       glimepiride (AMARYL) 4 MG tablet Take 1 tablet by mouth as  needed.       hydrocortisone 2.5 % cream   0    isosorbide mononitrate (IMDUR) 30 MG 24 hr tablet Take 1 tablet (30 mg total) by mouth once daily. 30 tablet 11    levocetirizine (XYZAL) 5 MG tablet Take 5 mg by mouth every evening.      losartan (COZAAR) 50 MG tablet Take 50 mg by mouth once daily.      metformin (GLUCOPHAGE) 1000 MG tablet Take 1 tablet by mouth Daily.      nitroGLYCERIN (NITROSTAT) 0.4 MG SL tablet Place 1 tablet (0.4 mg total) under the tongue every 5 (five) minutes as needed for Chest pain. 30 tablet 12    tacrolimus (PROGRAF) 1 MG Cap Take 2 capsules (2 mg total) by mouth every 12 (twelve) hours. 360 capsule 3    tamsulosin (FLOMAX) 0.4 mg Cap Take 1 capsule (0.4 mg total) by mouth once daily. 30 capsule 0    valacyclovir (VALTREX) 500 MG tablet Take 500 mg by mouth once daily.           PMHx, PSHx, Allergies, Medications reviewed in epic    ROS negative except pain complaints in HPI    OBJECTIVE:    There were no vitals taken for this visit.    PHYSICAL EXAMINATION:    GENERAL: Well appearing, in no acute distress, alert and oriented x3.  PSYCH:  Mood and affect appropriate.  SKIN: Skin color, texture, turgor normal, no rashes or lesions which will impact the procedure.  CV: RRR with palpation of the radial artery.  PULM: No evidence of respiratory difficulty, symmetric chest rise. Clear to auscultation.  NEURO: Cranial nerves grossly intact.    Plan:    Proceed with procedure as planned Procedure(s) (LRB):  Bilateral L4/5 TF JULIUS (Bilateral)    Bill Read MD  09/18/2023

## 2023-09-20 NOTE — TELEPHONE ENCOUNTER
----- Message from Karlene Vinson sent at 2/6/2023 10:02 AM CST -----  Regarding: Appt Request  Pt is Requesting a Call back Regarding Scheduling appt for a Follow Up,   Please Call to discuss further .      [phone].     Called pt to f/u on her 3 month smoking cessation quit status. Pt stated she is still smoking, but has cut back and is still in program with [facility].  Informed her of benefit period, phone follow ups, and contact information. Will complete smart form and will continue to follow up on quit #2 episode. After I informed pt where I was calling from she inquired again if I was calling with [facility]. Informed pt I was calling from [facility] with case management and our reason for 3, 6,and 12 month follow ups.

## 2023-10-10 ENCOUNTER — TELEPHONE (OUTPATIENT)
Dept: TRANSPLANT | Facility: CLINIC | Age: 68
End: 2023-10-10
Payer: MEDICARE

## 2023-10-10 NOTE — TELEPHONE ENCOUNTER
Writer has left VM for patient as he is overdue for his liver transplant labs having missed his 9/28/23 apt.  Missed lab letter also mailed out.    Remind me card placed to reach back out to patient 11/6/2023

## 2023-10-17 ENCOUNTER — TELEPHONE (OUTPATIENT)
Dept: TRANSPLANT | Facility: CLINIC | Age: 68
End: 2023-10-17
Payer: MEDICARE

## 2023-10-17 DIAGNOSIS — Z94.4 LIVER TRANSPLANTED: Primary | ICD-10-CM

## 2023-10-17 NOTE — TELEPHONE ENCOUNTER
----- Message from Shanice Le RN sent at 10/17/2023  8:32 AM CDT -----  Regarding: FW: Consult/Advisory  Contact: Chapo Jacobo  He is calling due to the missed lab letter I sent. He is due for labs, no real rush but need him to do his routine labs. I failed to geovanna off INR I think on his card, please include that test too. I just added the order:)      ----- Message -----  From: Yogesh Moncada  Sent: 10/17/2023   8:29 AM CDT  To: [facility]  Subject: Consult/Advisory                                 Returning a Missed Call    Caller: Chapo Jacobo       Returning call to: Shanice Le       Caller can be reached @:  [phone] (home) [phone] (work)     Nature of the call: Patient returning call to Shanice, coordinator regarding letter received. Requesting a call back.

## 2023-10-18 ENCOUNTER — TELEPHONE (OUTPATIENT)
Dept: TRANSPLANT | Facility: CLINIC | Age: 68
End: 2023-10-18
Payer: MEDICARE

## 2023-10-20 ENCOUNTER — TELEPHONE (OUTPATIENT)
Dept: TRANSPLANT | Facility: CLINIC | Age: 68
End: 2023-10-20
Payer: MEDICARE

## 2023-10-25 ENCOUNTER — LAB VISIT (OUTPATIENT)
Dept: LAB | Facility: HOSPITAL | Age: 68
End: 2023-10-25
Attending: INTERNAL MEDICINE
Payer: MEDICARE

## 2023-10-25 DIAGNOSIS — Z94.4 LIVER TRANSPLANTED: ICD-10-CM

## 2023-10-25 DIAGNOSIS — Z94.4 LIVER REPLACED BY TRANSPLANT: ICD-10-CM

## 2023-10-25 LAB
BASOPHILS # BLD AUTO: 0.11 K/UL (ref 0–0.2)
BASOPHILS NFR BLD: 1.4 % (ref 0–1.9)
DIFFERENTIAL METHOD: ABNORMAL
EOSINOPHIL # BLD AUTO: 0.1 K/UL (ref 0–0.5)
EOSINOPHIL NFR BLD: 1.7 % (ref 0–8)
ERYTHROCYTE [DISTWIDTH] IN BLOOD BY AUTOMATED COUNT: 12.5 % (ref 11.5–14.5)
HCT VFR BLD AUTO: 48.1 % (ref 40–54)
HGB BLD-MCNC: 16.9 G/DL (ref 14–18)
IMM GRANULOCYTES # BLD AUTO: 0.07 K/UL (ref 0–0.04)
IMM GRANULOCYTES NFR BLD AUTO: 0.9 % (ref 0–0.5)
INR PPP: 1 (ref 0.8–1.2)
LYMPHOCYTES # BLD AUTO: 2 K/UL (ref 1–4.8)
LYMPHOCYTES NFR BLD: 26 % (ref 18–48)
MCH RBC QN AUTO: 31.9 PG (ref 27–31)
MCHC RBC AUTO-ENTMCNC: 35.1 G/DL (ref 32–36)
MCV RBC AUTO: 91 FL (ref 82–98)
MONOCYTES # BLD AUTO: 0.7 K/UL (ref 0.3–1)
MONOCYTES NFR BLD: 8.4 % (ref 4–15)
NEUTROPHILS # BLD AUTO: 4.8 K/UL (ref 1.8–7.7)
NEUTROPHILS NFR BLD: 61.6 % (ref 38–73)
NRBC BLD-RTO: 0 /100 WBC
PLATELET # BLD AUTO: 211 K/UL (ref 150–450)
PMV BLD AUTO: 12.8 FL (ref 9.2–12.9)
PROTHROMBIN TIME: 10.9 SEC (ref 9–12.5)
RBC # BLD AUTO: 5.29 M/UL (ref 4.6–6.2)
WBC # BLD AUTO: 7.74 K/UL (ref 3.9–12.7)

## 2023-10-25 PROCEDURE — 80053 COMPREHEN METABOLIC PANEL: CPT | Performed by: INTERNAL MEDICINE

## 2023-10-25 PROCEDURE — 85025 COMPLETE CBC W/AUTO DIFF WBC: CPT | Performed by: INTERNAL MEDICINE

## 2023-10-25 PROCEDURE — 80197 ASSAY OF TACROLIMUS: CPT | Performed by: INTERNAL MEDICINE

## 2023-10-25 PROCEDURE — 36415 COLL VENOUS BLD VENIPUNCTURE: CPT | Mod: PO | Performed by: INTERNAL MEDICINE

## 2023-10-25 PROCEDURE — 85610 PROTHROMBIN TIME: CPT | Performed by: INTERNAL MEDICINE

## 2023-10-26 LAB
ALBUMIN SERPL BCP-MCNC: 3.9 G/DL (ref 3.5–5.2)
ALP SERPL-CCNC: 76 U/L (ref 55–135)
ALT SERPL W/O P-5'-P-CCNC: 20 U/L (ref 10–44)
ANION GAP SERPL CALC-SCNC: 12 MMOL/L (ref 8–16)
AST SERPL-CCNC: 24 U/L (ref 10–40)
BILIRUB SERPL-MCNC: 0.7 MG/DL (ref 0.1–1)
BUN SERPL-MCNC: 10 MG/DL (ref 8–23)
CALCIUM SERPL-MCNC: 9.6 MG/DL (ref 8.7–10.5)
CHLORIDE SERPL-SCNC: 101 MMOL/L (ref 95–110)
CO2 SERPL-SCNC: 21 MMOL/L (ref 23–29)
CREAT SERPL-MCNC: 1.2 MG/DL (ref 0.5–1.4)
EST. GFR  (NO RACE VARIABLE): >60 ML/MIN/1.73 M^2
GLUCOSE SERPL-MCNC: 270 MG/DL (ref 70–110)
POTASSIUM SERPL-SCNC: 4.2 MMOL/L (ref 3.5–5.1)
PROT SERPL-MCNC: 7.7 G/DL (ref 6–8.4)
SODIUM SERPL-SCNC: 134 MMOL/L (ref 136–145)
TACROLIMUS BLD-MCNC: 7.5 NG/ML (ref 5–15)

## 2023-10-27 ENCOUNTER — TELEPHONE (OUTPATIENT)
Dept: TRANSPLANT | Facility: CLINIC | Age: 68
End: 2023-10-27
Payer: MEDICARE

## 2023-10-27 NOTE — LETTER
October 27, 2023    Chapo Jacobo  [address] [address] [facility] [address]          Dear Chapo Jacobo:  MRN: [id number]    This is a follow up to your recent labs, your lab results were stable.  There are no medicine changes.  Please have your labs drawn again on 1/8/2024.      If you cannot have your labs drawn on the scheduled date, it is your responsibility to call the transplant department to reschedule.  Please call [phone] and ask to speak to Racheal FLOWER -  for all scheduling requests.     Sincerely,    Shanice VILLATORON, RN      Your Liver Transplant Coordinator    [facility]  [address] [place] [id number] [phone]

## 2023-10-27 NOTE — TELEPHONE ENCOUNTER
Letter sent to patient stating: Your labs have been reviewed by your Transplant physician, no action required. Next labs due 1/8/2024          ----- Message from Viviana Plascencia MD sent at 10/27/2023  2:56 PM CDT -----  Reviewed, nothing to do; repeat per routine

## 2023-10-27 NOTE — LETTER
October 27, 2023    Chapo Jacobo  [address] [address] [facility] [address]          Dear Chapo Jacobo:  MRN: [id number]    This is a follow up to your recent labs, your lab results were stable.  There are no medicine changes.  Please have your labs drawn again on 1/8/2024.      If you cannot have your labs drawn on the scheduled date, it is your responsibility to call the transplant department to reschedule.  Please call [phone] and ask to speak to Racheal FLOWER -  for all scheduling requests.     Sincerely,  Shanice VILLATORON, RN      Your Liver Transplant Coordinator    [facility]  [address] [place] [id number] [phone]

## 2023-12-04 ENCOUNTER — TELEPHONE (OUTPATIENT)
Dept: TRANSPLANT | Facility: CLINIC | Age: 68
End: 2023-12-04
Payer: MEDICARE

## 2023-12-04 NOTE — TELEPHONE ENCOUNTER
Patient did not show for scheduled liver transplant ultrasound that was scheduled for today, card given to txp  to contact patient for rescheduling.

## 2024-01-03 ENCOUNTER — HOSPITAL ENCOUNTER (OUTPATIENT)
Dept: RADIOLOGY | Facility: HOSPITAL | Age: 69
Discharge: HOME | End: 2024-01-03
Attending: INTERNAL MEDICINE
Payer: MEDICARE

## 2024-01-03 ENCOUNTER — OFFICE VISIT (OUTPATIENT)
Dept: NEUROLOGY | Facility: CLINIC | Age: 69
End: 2024-01-03
Payer: MEDICARE

## 2024-01-03 VITALS
DIASTOLIC BLOOD PRESSURE: 80 MMHG | BODY MASS INDEX: 32.03 KG/M2 | SYSTOLIC BLOOD PRESSURE: 121 MMHG | HEIGHT: 70 IN | HEART RATE: 89 BPM | WEIGHT: 223.75 LBS | RESPIRATION RATE: 16 BRPM

## 2024-01-03 DIAGNOSIS — Z72.0 TOBACCO ABUSE: ICD-10-CM

## 2024-01-03 DIAGNOSIS — I45.10 RBBB: ICD-10-CM

## 2024-01-03 DIAGNOSIS — M79.2 NERVE PAIN: ICD-10-CM

## 2024-01-03 DIAGNOSIS — F43.21 GRIEVING: ICD-10-CM

## 2024-01-03 DIAGNOSIS — E11.49 TYPE 2 DIABETES MELLITUS WITH OTHER NEUROLOGIC COMPLICATION, WITHOUT LONG-TERM CURRENT USE OF INSULIN: ICD-10-CM

## 2024-01-03 DIAGNOSIS — R20.8 BURNING SENSATION OF FEET: ICD-10-CM

## 2024-01-03 DIAGNOSIS — M79.2 NEUROPATHIC PAIN: ICD-10-CM

## 2024-01-03 DIAGNOSIS — R68.89 MULTIPLE COMPLAINTS: Primary | ICD-10-CM

## 2024-01-03 DIAGNOSIS — R20.0 ANESTHESIA OF SKIN: ICD-10-CM

## 2024-01-03 DIAGNOSIS — R25.1 TREMORS OF NERVOUS SYSTEM: ICD-10-CM

## 2024-01-03 DIAGNOSIS — R26.81 UNSTEADY GAIT: ICD-10-CM

## 2024-01-03 DIAGNOSIS — M54.9 DORSALGIA, UNSPECIFIED: ICD-10-CM

## 2024-01-03 DIAGNOSIS — Z65.8 PSYCHOSOCIAL STRESSORS: ICD-10-CM

## 2024-01-03 DIAGNOSIS — F32.A DEPRESSION, UNSPECIFIED DEPRESSION TYPE: ICD-10-CM

## 2024-01-03 DIAGNOSIS — Z94.4 LIVER REPLACED BY TRANSPLANT: ICD-10-CM

## 2024-01-03 PROCEDURE — 99999 PR PBB SHADOW E&M-EST. PATIENT-LVL V: CPT | Mod: PBBFAC,,, | Performed by: NURSE PRACTITIONER

## 2024-01-03 PROCEDURE — 76705 ECHO EXAM OF ABDOMEN: CPT | Mod: 59,TC

## 2024-01-03 PROCEDURE — 93976 VASCULAR STUDY: CPT | Mod: 26,,, | Performed by: STUDENT IN AN ORGANIZED HEALTH CARE EDUCATION/TRAINING PROGRAM

## 2024-01-03 PROCEDURE — 99214 OFFICE O/P EST MOD 30 MIN: CPT | Mod: S$GLB,,, | Performed by: NURSE PRACTITIONER

## 2024-01-03 PROCEDURE — 76705 ECHO EXAM OF ABDOMEN: CPT | Mod: 26,59,, | Performed by: STUDENT IN AN ORGANIZED HEALTH CARE EDUCATION/TRAINING PROGRAM

## 2024-01-03 RX ORDER — AMITRIPTYLINE HYDROCHLORIDE 25 MG/1
25 TABLET, FILM COATED ORAL NIGHTLY
Qty: 30 TABLET | Refills: 11 | Status: SHIPPED | OUTPATIENT
Start: 2024-01-03 | End: 2025-01-02

## 2024-01-03 NOTE — PROGRESS NOTES
Subjective:       Patient ID: Chapo Jacobo is a 68 y.o. male.    Chief Complaint: Multiple complaints       Referred Moreno Aldana MD       HPI The patient is new to me. Patient is present for multiple complaints related to nerve pain, depression and grieving.  Patient reports longstanding complaint of burning sensation of bilateral feet, he reports it is worse at night. Patient denies any pain in upper extremity he reports having some numbness in bilateral hands but reports it is not bothersome at this time. Patient reports that he aware his he has neuropathy patient reports it is related to his uncontrolled DM. Patient states he don't check his glucose. Chronic DM Type II . No known history of thyroid disease.  Patient reports that he is exteremly depressed, he states that he has loss motivation, most days he don't want to get out of bed, Patient states that he feels he has PTSD. Patient taking Prozasc 80 mg po daily, and Xanax 1 mg po BID (takes in on AM dose) managed by PCP.  The patients reports significant family loss Mother and daughter in law, son OD on fentanyl.  Patient has difficulty with sleep, takes Amiebin 10 mg po reports it is not working.  No history of malignancies.  No history of toxic environmental exposure. S/p Liver Transplant. The patient has chronic Neck/LBP as well.   No history of autoimmune disease. Positive History for excessive alcohol and recreational drug abuse stopped 2005. Patient reports that he was a IV drug user.  History of chronic hepatitis C related to drug use. No known history of malabsorption or nutritional deficiencies. Failed GBP, Failed PGB.         INTERVAL HISTORY 01-: Patient present for follow up for Neuropathy management and psychosocial stressor. Patient did not start Amitriptyline/Elavil 25 mg QHS for neuropathic pain and insomnia. He still has ongoing complaints of pain and insomnia. Patient reports uncontrolled anxiety and depression. He states  he missed appt. With psychiatry. Patient reports he would like to talk with a specialist to help with JO ANN and MDD medication  management. Patient currently taking Prozac 80 mg po Daily and Xanax 2 mg po( managed by PCP).     Discussed work up results. MRI L-Spine WO: 07- Postsurgical changes of the L5-S1 posterior elements/spinous processes.  Left L5-S1 subarticular zone disc protrusion narrows the left lateral recess and contributes to mild-to-moderate left-sided neural foraminal stenosis. Mild L4-L5 spinal canal stenosis with mild right and minor left-sided neural foraminal stenosis.    MRI Brain WO: 07- MRI Brain NL age related changes 07- CMP NL except Glucose 209^, HC 9.5 NL, RF <13.0 NL, ÓSCAR neg, FA level 7.0 NL, Vitamin B 12 309 Nl  (optimal level <450) Labs NL. Patient verbalized understanding.            Review of Systems   Constitutional:  Positive for activity change.   HENT: Negative.     Eyes: Negative.    Respiratory: Negative.     Cardiovascular: Negative.    Gastrointestinal: Negative.    Endocrine: Negative.    Genitourinary:  Positive for urgency.   Musculoskeletal:  Positive for arthralgias, back pain and gait problem.   Skin: Negative.    Allergic/Immunologic: Negative.    Neurological:  Positive for numbness.        BURNING SENSATION BILATERAL FEET    Hematological: Negative.    Psychiatric/Behavioral:  Positive for decreased concentration, dysphoric mood and sleep disturbance. Negative for confusion and hallucinations. The patient is nervous/anxious. The patient is not hyperactive.         SAD                 Current Outpatient Medications:     alprazolam (XANAX) 1 MG tablet, Take 1 mg by mouth 2 (two) times daily. Patient takes 1 tablet twice daily, Disp: , Rfl: 1    cyclobenzaprine (FLEXERIL) 10 MG tablet, Take 0.5 tablets (5 mg total) by mouth 3 (three) times daily as needed for Muscle spasms., Disp: 15 tablet, Rfl: 0    dextroamphetamine-amphetamine 30 mg Tab, Take 1  tablet by mouth as needed. , Disp: , Rfl:     FLUoxetine 20 MG capsule, Take 4 capsules by mouth Daily. , Disp: , Rfl:     glimepiride (AMARYL) 4 MG tablet, Take 1 tablet by mouth as needed. , Disp: , Rfl:     hydrocortisone 2.5 % cream, , Disp: , Rfl: 0    isosorbide mononitrate (IMDUR) 30 MG 24 hr tablet, Take 1 tablet (30 mg total) by mouth once daily., Disp: 30 tablet, Rfl: 11    levocetirizine (XYZAL) 5 MG tablet, Take 5 mg by mouth every evening., Disp: , Rfl:     losartan (COZAAR) 50 MG tablet, Take 50 mg by mouth once daily., Disp: , Rfl:     metformin (GLUCOPHAGE) 1000 MG tablet, Take 1 tablet by mouth Daily., Disp: , Rfl:     tacrolimus (PROGRAF) 1 MG Cap, Take 2 capsules (2 mg total) by mouth every 12 (twelve) hours., Disp: 360 capsule, Rfl: 3    valacyclovir (VALTREX) 500 MG tablet, Take 500 mg by mouth once daily. , Disp: , Rfl:     amitriptyline (ELAVIL) 25 MG tablet, Take 1 tablet (25 mg total) by mouth every evening., Disp: 30 tablet, Rfl: 11    nitroGLYCERIN (NITROSTAT) 0.4 MG SL tablet, Place 1 tablet (0.4 mg total) under the tongue every 5 (five) minutes as needed for Chest pain., Disp: 30 tablet, Rfl: 12    tamsulosin (FLOMAX) 0.4 mg Cap, Take 1 capsule (0.4 mg total) by mouth once daily., Disp: 30 capsule, Rfl: 0  Past Medical History:   Diagnosis Date    Anxiety     Appendicitis     Depression     Diabetes mellitus     Encounter for blood transfusion     Gallbladder & bile duct stone with obstruction     Herpes     Sleep apnea      Past Surgical History:   Procedure Laterality Date    ABDOMINAL SURGERY      APPENDECTOMY      bilateral rotator cuff surgery      BRAIN SURGERY      CERVICAL FUSION      CHOLECYSTECTOMY      COLONOSCOPY N/A 7/10/2017    Procedure: COLONOSCOPY;  Surgeon: Viviana Reyes MD;  Location: [facility];  Service: Endoscopy;  Laterality: N/A;    LIVER TRANSPLANT      LUMBAR DISC SURGERY      SELECTIVE INJECTION OF ANESTHETIC AGENT AROUND LUMBAR SPINAL NERVE ROOT BY  TRANSFORAMINAL APPROACH Bilateral 9/18/2023    Procedure: Bilateral L4/5 TF JULIUS;  Surgeon: Bill Read MD;  Location: [facility];  Service: Pain Management;  Laterality: Bilateral;    TONSILLECTOMY       Social History     Socioeconomic History    Marital status: Single   Tobacco Use    Smoking status: Every Day     Current packs/day: 1.00     Average packs/day: 1 pack/day for 46.0 years (46.0 ttl pk-yrs)     Types: Cigarettes    Smokeless tobacco: Never   Substance and Sexual Activity    Alcohol use: Yes     Alcohol/week: 2.0 standard drinks of alcohol     Types: 2 Cans of beer per week    Drug use: No    Sexual activity: Yes     Partners: Female             Past/Current Medical/Surgical History, Past/Current Social History, Past/Current Family History and Past/Current Medications were reviewed in detail.        Objective:           VITAL SIGNS WERE REVIEWED      GENERAL APPEARANCE:     The patient looks comfortable, sad, depressed.     BMI 33.12 KG     No signs of respiratory distress.    Normal breathing pattern.    No dysmorphic features    Normal eye contact.     GENERAL MEDICAL EXAM:    HEENT:  Head is atraumatic normocephalic.     No tender temporal arteries. Fundoscopic (Ophthalmoscopic) exam showed no disc edema.      Neck and Axillae: No JVD. No visible lesions.    No carotid bruits. No thyromegaly. No lymphadenopathy.    Cardiopulmonary: No cyanosis. No tachypnea. Normal respiratory effort.     Gastrointestinal/Urogenital:  No jaundice. No stomas or lesions. No visible hernias. No catheters.     Abdomen is soft non-tender. No masses or organomegaly.    Skin, Hair and Nails: No pathognonomic skin rash. No neurofibromatosis. No visible lesions.No stigmata of autoimmune disease. No clubbing.    Skin is warm and moist. No palpable masses.    Limbs: No varicose veins. No visible swelling.    No palpable edema. Pulses are symmetric. Pedal pulses are palpable.      Muskoskeletal: + OA visible  deformities.No visible lesions.    No spine tenderness. + signs of longstanding neuropathy. No dislocations or fractures.            Neurologic Exam     Mental Status   Oriented to person, place, and time.   Registration: recalls 3 of 3 objects. Recall at 5 minutes: recalls 3 of 3 objects. Follows 3 step commands.   Attention: decreased. Concentration: decreased.   Speech: speech is normal and not slurred   Level of consciousness: alert  Knowledge: good and consistent with education. Able to perform simple calculations.   Able to name object. Able to read. Able to repeat. Able to write. Normal comprehension.     Cranial Nerves     CN II   Visual fields full to confrontation.   Visual acuity: normal with correction  Right visual field deficit: none  Left visual field deficit: none     CN III, IV, VI   Pupils are equal, round, and reactive to light.  Extraocular motions are normal.   Right pupil: Size: 2 mm. Shape: regular. Reactivity: brisk. Consensual response: intact. Accommodation: intact.   Left pupil: Size: 2 mm. Shape: regular. Reactivity: brisk. Consensual response: intact. Accommodation: intact.   CN III: no CN III palsy  CN VI: no CN VI palsy  Nystagmus: none   Diplopia: none  Ophthalmoparesis: none  Upgaze: normal  Downgaze: normal  Conjugate gaze: present  Vestibulo-ocular reflex: present    CN V   Facial sensation intact.   Right facial sensation deficit: none  Left facial sensation deficit: none    CN VII   Right facial weakness: none  Left facial weakness: none    CN VIII   CN VIII normal.   Hearing: impaired    CN IX, X   CN IX normal.   CN X normal.   Palate: symmetric    CN XI   CN XI normal.   Right sternocleidomastoid strength: normal  Left sternocleidomastoid strength: normal  Right trapezius strength: normal  Left trapezius strength: normal    CN XII   CN XII normal.   Tongue: not atrophic  Fasciculations: absent  Tongue deviation: none    Motor Exam   Muscle bulk: normal  Overall muscle tone:  normal  Right arm tone: normal  Left arm tone: normal  Right arm pronator drift: absent  Left arm pronator drift: absent  Right leg tone: normal  Left leg tone: normal    Strength   Strength 5/5 throughout.   Right neck flexion:   Left neck flexion:   Right neck extension:   Left neck extension:   Right deltoid:   Left deltoid:   Right biceps:   Left biceps:   Right triceps:   Left triceps:   Right wrist flexion:   Left wrist flexion:   Right wrist extension:   Left wrist extension:   Right interossei:   Left interossei:   Right iliopsoas:   Left iliopsoas:   Right quadriceps:   Left quadriceps:   Right hamstrin/5  Left hamstrin/5  Right glutei:   Left glutei:   Right anterior tibial:   Left anterior tibial:   Right posterior tibial:   Left posterior tibial:   Right peroneal:   Left peroneal:   Right gastroc:   Left gastroc:     Sensory Exam   Right arm light touch: normal  Left arm light touch: normal  Right leg light touch: decreased from toes  Left leg light touch: decreased from toes  Right arm vibration: normal  Left arm vibration: normal  Right leg vibration: decreased from toes  Left leg vibration: decreased from toes  Right arm proprioception: normal  Left arm proprioception: normal  Right leg proprioception: decreased from ankle  Left leg proprioception: decreased from ankle  Right arm pinprick: normal  Left arm pinprick: normal  Right leg pinprick: decreased from toes  Left leg pinprick: decreased from toes  Graphesthesia: normal  Stereognosis: normal    Gait, Coordination, and Reflexes     Gait  Gait: wide-based    Coordination   Finger to nose coordination: normal    Tremor   Resting tremor: absent  Intention tremor: absent  Action tremor: left arm    Reflexes   Right brachioradialis: 2+  Left brachioradialis: 2+  Right biceps: 2+  Left biceps: 2+  Right triceps: 2+  Left triceps: 2+  Right patellar:  2+  Left patellar: 2+  Right achilles: 0  Left achilles: 0  Right plantar: normal  Left plantar: normal  Right Bryant: absent  Left Bryant: absent  Right ankle clonus: absent  Left ankle clonus: absent  Right pendular knee jerk: absent  Left pendular knee jerk: absent    Postural ET R>L      Unsteady gait limping              Lab Results   Component Value Date    WBC 7.74 10/25/2023    HGB 16.9 10/25/2023    HCT 48.1 10/25/2023    MCV 91 10/25/2023     10/25/2023     Sodium   Date Value Ref Range Status   10/25/2023 134 (L) 136 - 145 mmol/L Final     Potassium   Date Value Ref Range Status   10/25/2023 4.2 3.5 - 5.1 mmol/L Final     Chloride   Date Value Ref Range Status   10/25/2023 101 95 - 110 mmol/L Final     CO2   Date Value Ref Range Status   10/25/2023 21 (L) 23 - 29 mmol/L Final     Glucose   Date Value Ref Range Status   10/25/2023 270 (H) 70 - 110 mg/dL Final     BUN   Date Value Ref Range Status   10/25/2023 10 8 - 23 mg/dL Final     Creatinine   Date Value Ref Range Status   10/25/2023 1.2 0.5 - 1.4 mg/dL Final     Calcium   Date Value Ref Range Status   10/25/2023 9.6 8.7 - 10.5 mg/dL Final     Total Protein   Date Value Ref Range Status   10/25/2023 7.7 6.0 - 8.4 g/dL Final     Albumin   Date Value Ref Range Status   10/25/2023 3.9 3.5 - 5.2 g/dL Final     Total Bilirubin   Date Value Ref Range Status   10/25/2023 0.7 0.1 - 1.0 mg/dL Final     Comment:     For infants and newborns, interpretation of results should be based  on gestational age, weight and in agreement with clinical  observations.    Premature Infant recommended reference ranges:  Up to 24 hours.............<8.0 mg/dL  Up to 48 hours............<12.0 mg/dL  3-5 days..................<15.0 mg/dL  6-29 days.................<15.0 mg/dL       Alkaline Phosphatase   Date Value Ref Range Status   10/25/2023 76 55 - 135 U/L Final     AST   Date Value Ref Range Status   10/25/2023 24 10 - 40 U/L Final     ALT   Date Value Ref Range  Status   10/25/2023 20 10 - 44 U/L Final     Anion Gap   Date Value Ref Range Status   10/25/2023 12 8 - 16 mmol/L Final     eGFR if    Date Value Ref Range Status   04/06/2022 >60 >60 mL/min/1.73 m^2 Final     eGFR if non    Date Value Ref Range Status   04/06/2022 >60 >60 mL/min/1.73 m^2 Final     Comment:     Calculation used to obtain the estimated glomerular filtration  rate (eGFR) is the CKD-EPI equation.        Lab Results   Component Value Date    PCMVNBHV24 309 07/03/2023     Lab Results   Component Value Date    TSH 2.487 09/28/2022 07-        CMP NL except Glucose 209^, HC 9.5 NL, RF <13.0 NL, ÓSCAR neg, FA level 7.0 NL, Vitamin B 12 309 Nl  (optimal level <450), Vitamin B 1 pending        Labs NL     CMP NL except Glucose 209^, managed by PCP    MRI L-Spine WO:    07-    Postsurgical changes of the L5-S1 posterior elements/spinous processes.  Left L5-S1 subarticular zone disc protrusion narrows the left lateral recess and contributes to mild-to-moderate left-sided neural foraminal stenosis.  2. Mild L4-L5 spinal canal stenosis with mild right and minor left-sided neural foraminal stenosis.      MRI Brain WO:    07-      MRI Brain NL age related changes     Reviewed the neuroimaging independently       Assessment:       1. Multiple complaints    2. Burning sensation of feet    3. Neuropathic pain    4. Nerve pain    5. Grieving    6. Depression, unspecified depression type    7. Psychosocial stressors    8. Anesthesia of skin    9. Tobacco abuse    10. Liver replaced by transplant    11. Type 2 diabetes mellitus with other neurologic complication, without long-term current use of insulin    12. Dorsalgia, unspecified    13. Unsteady gait    14. Tremors of nervous system    15. RBBB          Plan:               MULTIPLE COMPLAINTS: NEUROPATHIC PAIN MULTIFACTORIAL: HX DRUG ABUSE , HEP C/ DPN, T2DM/ Hx of LIVER TRANSPLANT/ CHRONIC BACK PAIN NUMBNESS  TINGLING/ BURNING SENSATION UNSTEADY GAIT MDD/ GRIEVING/ INSOMNIA           EVALUATE    BS control.    Hold off on neuropathic pain meds since she has no pain.    Discussed with the patient some of the neuropathy precautions like:    Always use oven mitts.    Test water with an unaffected hand or foot.    Use caution when trimming nails. File sharp areas    Wear shoes that fit well to avoid pressure points, blisters, and ulcers.    Inspect your hands and feet carefully (including the soles of your feet and between your toes) at least once a week.         In terms of management I counseled the patient that neuropathic pain meds target is 40% reduction of pain.      Will start Amitriptyline/Elavil 25 mg QHS for neuropathic pain and insomnia      Failed Gabapentin/GBP-Neurontin, Failed  Pregabalin/PGB-Lyrica     NEXT OPTIONS:      Duloxetine/Cymbalta which causes sedation, weight gain and sexual dysfunction and potentially interactions can cause serotonin syndrome. Will titrate slowly to 60 mg daily.      Neuropathy precautions like:     Always use oven mitts.     Test water with an unaffected hand or foot.     Use caution when trimming nails. File sharp areas     Wear shoes that fit well to avoid pressure points, blisters, and ulcers.     Inspect your hands and feet carefully (including the soles of your feet and between your toes) at least once a week.      MDD/JO ANN/GRIEVING FAMILY LOSS     Refer to Psychiatry     Continue Prozac 80 mg po Daily and Xanax 2 mg po  ( managed by PCP)       TREMORS OF NERVOUS SYSTEM BUE, MILD    Clinically monitor    Avoid stimulants like caffeine, nicotineetc.    Avoid hot drinks, drink from half empty glasses and wear heavy bracelet/watch.       MEDICAL/SURGICAL COMORBIDITIES     All relevant medical comorbidities noted and managed by primary care physician and medical care team.          MISCELLANEOUS MEDICAL PROBLEMS       HEALTHY LIFESTYLE AND PREVENTATIVE CARE    Encouraged the  patient to adhere to the age-appropriate health maintenance guidelines including screening tests and vaccinations.     Discussed the overall importance of healthy lifestyle, optimal weight, exercise, healthy diet, good sleep hygiene and avoiding drugs including smoking, alcohol and recreational drugs. The patient verbalized full understanding.       Advised the patient to follow COVID-19 prevention measures.       I spent 30 minutes more than 50 % spent face to face with the patient    Time spent in counseling and coordination of care including discussions etiology of diagnosis, pathogenesis of diagnosis, prognosis of diagnosis,, diagnostic results, impression and recommendations, diagnostic studies, management, risks and benefits of treatment, instructions of disease self-management, treatment instructions, follow up requirements, patient and family counseling/involvement in care compliance with treatment regimen. All of the patient's questions were answered during this discussion.       Radha Mcgarry, MSN NP      Collaborating Provider: Stacey Cohen MD, FAAN Neurologist/Epileptologist

## 2024-01-08 ENCOUNTER — TELEPHONE (OUTPATIENT)
Dept: TRANSPLANT | Facility: CLINIC | Age: 69
End: 2024-01-08
Payer: MEDICARE

## 2024-01-08 DIAGNOSIS — Z94.4 LIVER TRANSPLANTED: Primary | ICD-10-CM

## 2024-01-08 NOTE — TELEPHONE ENCOUNTER
Letter sent to patient stating : Your imaging has been reviewed by your physician, stable and no action required.          Viviana Plascencia MD  1/4/2024  4:42 PM CST     No concerning liver masses

## 2024-01-08 NOTE — LETTER
January 8, 2024    [place] Fagan[place]  [address] [facility] [address]          Dear Chapo Jacobo:  MRN: [id number]    This is a follow up to your recent labs, your liver transplant ultrasound showed no concern for liver masses, results were stable.      If you cannot have your labs drawn on the scheduled date, it is your responsibility to call the transplant department to reschedule.  Please call [phone] and ask to speak to Racheal FLOWER -  for all scheduling requests.     Sincerely,    Shanice VILLATORON, RN      Your Liver Transplant Coordinator    [facility]  [address] [place] [id number] [phone]

## 2024-01-18 ENCOUNTER — LAB VISIT (OUTPATIENT)
Dept: LAB | Facility: HOSPITAL | Age: 69
End: 2024-01-18
Attending: INTERNAL MEDICINE
Payer: MEDICARE

## 2024-01-18 DIAGNOSIS — Z94.4 LIVER REPLACED BY TRANSPLANT: ICD-10-CM

## 2024-01-18 DIAGNOSIS — K74.60 HEPATIC CIRRHOSIS, UNSPECIFIED HEPATIC CIRRHOSIS TYPE, UNSPECIFIED WHETHER ASCITES PRESENT: ICD-10-CM

## 2024-01-18 DIAGNOSIS — C22.0 HCC (HEPATOCELLULAR CARCINOMA): ICD-10-CM

## 2024-01-18 LAB
AFP SERPL-MCNC: 3.2 NG/ML (ref 0–8.4)
ALBUMIN SERPL BCP-MCNC: 4 G/DL (ref 3.5–5.2)
ALP SERPL-CCNC: 73 U/L (ref 55–135)
ALT SERPL W/O P-5'-P-CCNC: 17 U/L (ref 10–44)
ANION GAP SERPL CALC-SCNC: 8 MMOL/L (ref 8–16)
AST SERPL-CCNC: 23 U/L (ref 10–40)
BASOPHILS # BLD AUTO: 0.1 K/UL (ref 0–0.2)
BASOPHILS NFR BLD: 1.6 % (ref 0–1.9)
BILIRUB SERPL-MCNC: 0.6 MG/DL (ref 0.1–1)
BUN SERPL-MCNC: 11 MG/DL (ref 8–23)
CALCIUM SERPL-MCNC: 9.3 MG/DL (ref 8.7–10.5)
CHLORIDE SERPL-SCNC: 103 MMOL/L (ref 95–110)
CO2 SERPL-SCNC: 25 MMOL/L (ref 23–29)
CREAT SERPL-MCNC: 1 MG/DL (ref 0.5–1.4)
DIFFERENTIAL METHOD BLD: ABNORMAL
EOSINOPHIL # BLD AUTO: 0.2 K/UL (ref 0–0.5)
EOSINOPHIL NFR BLD: 2.9 % (ref 0–8)
ERYTHROCYTE [DISTWIDTH] IN BLOOD BY AUTOMATED COUNT: 13.2 % (ref 11.5–14.5)
EST. GFR  (NO RACE VARIABLE): >60 ML/MIN/1.73 M^2
GLUCOSE SERPL-MCNC: 254 MG/DL (ref 70–110)
HCT VFR BLD AUTO: 51.8 % (ref 40–54)
HGB BLD-MCNC: 17.4 G/DL (ref 14–18)
IMM GRANULOCYTES # BLD AUTO: 0.04 K/UL (ref 0–0.04)
IMM GRANULOCYTES NFR BLD AUTO: 0.6 % (ref 0–0.5)
INR PPP: 1.2 (ref 0.8–1.2)
LYMPHOCYTES # BLD AUTO: 1.5 K/UL (ref 1–4.8)
LYMPHOCYTES NFR BLD: 23.2 % (ref 18–48)
MCH RBC QN AUTO: 31.5 PG (ref 27–31)
MCHC RBC AUTO-ENTMCNC: 33.6 G/DL (ref 32–36)
MCV RBC AUTO: 94 FL (ref 82–98)
MONOCYTES # BLD AUTO: 0.5 K/UL (ref 0.3–1)
MONOCYTES NFR BLD: 8 % (ref 4–15)
NEUTROPHILS # BLD AUTO: 4 K/UL (ref 1.8–7.7)
NEUTROPHILS NFR BLD: 63.7 % (ref 38–73)
NRBC BLD-RTO: 0 /100 WBC
PLATELET # BLD AUTO: 203 K/UL (ref 150–450)
PMV BLD AUTO: 12.3 FL (ref 9.2–12.9)
POTASSIUM SERPL-SCNC: 4.5 MMOL/L (ref 3.5–5.1)
PROT SERPL-MCNC: 7.4 G/DL (ref 6–8.4)
PROTHROMBIN TIME: 12.6 SEC (ref 9–12.5)
RBC # BLD AUTO: 5.52 M/UL (ref 4.6–6.2)
SODIUM SERPL-SCNC: 136 MMOL/L (ref 136–145)
WBC # BLD AUTO: 6.25 K/UL (ref 3.9–12.7)

## 2024-01-18 PROCEDURE — 85025 COMPLETE CBC W/AUTO DIFF WBC: CPT | Performed by: INTERNAL MEDICINE

## 2024-01-18 PROCEDURE — 80197 ASSAY OF TACROLIMUS: CPT | Performed by: INTERNAL MEDICINE

## 2024-01-18 PROCEDURE — 80053 COMPREHEN METABOLIC PANEL: CPT | Performed by: INTERNAL MEDICINE

## 2024-01-18 PROCEDURE — 82105 ALPHA-FETOPROTEIN SERUM: CPT | Performed by: INTERNAL MEDICINE

## 2024-01-18 PROCEDURE — 85610 PROTHROMBIN TIME: CPT | Performed by: INTERNAL MEDICINE

## 2024-01-18 PROCEDURE — 36415 COLL VENOUS BLD VENIPUNCTURE: CPT | Mod: PO | Performed by: INTERNAL MEDICINE

## 2024-01-18 NOTE — PROGRESS NOTES
Subjective:    Patient ID:  Chapo Jacobo is a 68 y.o. male who presents for evaluation of Hypertension, Hyperlipidemia, Coronary Artery Disease, Peripheral Arterial Disease, and Chest Pain        HPI  Pt presents for eval.  Current med conditions include DM, HTN, RBBB, aortic aneurysm, PAD, CAD, DD, hyperlipidemia, obesity, COPD, liver transplant 20+  years ago due to HCV.  + Smoker.   Past hx pertinent for following:   He states a doctor Dr. Thomas Clifton at [facility] saw him in 2022 for ascending aortic aneurysm 4.3 cm.  Nuclear stress test March 2020: no ischemia, normal EF.  Echo March 2020 normal LV function.  CTA chest Sept 2022 (ER): 4.4 cm aortic aneurysm, no dissection, COPD.  Ecg 9/28/22 NSR, RBBB, possible old inferior infarct.  Now here.  Pt last seen July 2023.  Nuclear stress test Aug 2023 personally reviewed: attenuation defect, no ischemia, normal EF.  Echo Aug 2023 personally reviewed: normal EF, grade I DD.  B LE arterial vasc studies Aug 2023 personally reviewed: Right distal SFA demonstrates severe (76-99%) stenosis and has monophasic flow. POP demonstrates moderate (50-75%) stenosis and has monophasic flow; Left lower extremity, no hemodynamically significant stenosis  The right resting LUISA reduction is moderately to severely decreased. LUISA 0.66; The left resting LUISA reduction is normal.  Angina stable.  He is not sure he is taking the Imdur prescribed in past.  Uses sl ntg prn occasionally for CP.  No CHF sxs.  Still smoking.  BP is controlled.  ? If he is on lipid med or not -- [facility] and [facility]'t [facility] on [facility].  Limited on walking.  No clear claudication sxs.  No foot sores.  Weight stable.      Current Outpatient Medications:     JARDIANCE 10 mg tablet, 1 tablet Orally Once a day for 90 days, Disp: , Rfl:     mupirocin (BACTROBAN) 2 % ointment, APPLY TO SORE TWICE DAILY AS NEEDED, Disp: , Rfl:     triamcinolone acetonide 0.1% (KENALOG) 0.1 % Lotn, APPLY TO THE AFFECTED AREA(S)  TWICE DAILY, Disp: , Rfl:     alprazolam (XANAX) 1 MG tablet, Take 1 mg by mouth 2 (two) times daily. Patient takes 1 tablet twice daily, Disp: , Rfl: 1    amitriptyline (ELAVIL) 25 MG tablet, Take 1 tablet (25 mg total) by mouth every evening., Disp: 30 tablet, Rfl: 11    aspirin (ECOTRIN) 81 MG EC tablet, Take 1 tablet (81 mg total) by mouth once daily., Disp: 30 tablet, Rfl: 12    cyclobenzaprine (FLEXERIL) 10 MG tablet, Take 0.5 tablets (5 mg total) by mouth 3 (three) times daily as needed for Muscle spasms., Disp: 15 tablet, Rfl: 0    dextroamphetamine-amphetamine 30 mg Tab, Take 1 tablet by mouth as needed. , Disp: , Rfl:     FLUoxetine 20 MG capsule, Take 4 capsules by mouth Daily. , Disp: , Rfl:     glimepiride (AMARYL) 4 MG tablet, Take 1 tablet by mouth as needed. , Disp: , Rfl:     hydrocortisone 2.5 % cream, , Disp: , Rfl: 0    isosorbide mononitrate (IMDUR) 30 MG 24 hr tablet, Take 1 tablet (30 mg total) by mouth once daily., Disp: 90 tablet, Rfl: 3    levocetirizine (XYZAL) 5 MG tablet, Take 5 mg by mouth every evening., Disp: , Rfl:     losartan (COZAAR) 50 MG tablet, Take 50 mg by mouth once daily., Disp: , Rfl:     metformin (GLUCOPHAGE) 1000 MG tablet, Take 1 tablet by mouth Daily., Disp: , Rfl:     nitroGLYCERIN (NITROSTAT) 0.4 MG SL tablet, Place 1 tablet (0.4 mg total) under the tongue every 5 (five) minutes as needed for Chest pain., Disp: 30 tablet, Rfl: 12    tacrolimus (PROGRAF) 1 MG Cap, Take 2 capsules (2 mg total) by mouth every 12 (twelve) hours., Disp: 360 capsule, Rfl: 3    tamsulosin (FLOMAX) 0.4 mg Cap, Take 1 capsule (0.4 mg total) by mouth once daily., Disp: 30 capsule, Rfl: 0    valacyclovir (VALTREX) 500 MG tablet, Take 500 mg by mouth once daily. , Disp: , Rfl:       Review of Systems   Constitutional: Positive for malaise/fatigue.   HENT: Negative.     Eyes: Negative.    Cardiovascular:  Positive for chest pain and dyspnea on exertion.   Respiratory:  Positive for shortness of  breath and snoring.    Endocrine: Negative.    Hematologic/Lymphatic: Negative.    Skin: Negative.    Musculoskeletal:  Positive for arthritis and joint pain.   Gastrointestinal: Negative.    Genitourinary: Negative.    Neurological:  Positive for weakness.   Psychiatric/Behavioral:  Positive for depression.    Allergic/Immunologic: Negative.        /80 (BP Location: Right arm, Patient Position: Sitting, BP Method: Small (Manual))   Pulse 91   Wt 101.4 kg (223 lb 8.7 oz)   SpO2 96%   BMI 32.08 kg/m²     Wt Readings from Last 3 Encounters:   01/19/24 101.4 kg (223 lb 8.7 oz)   01/03/24 101.5 kg (223 lb 12.3 oz)   09/18/23 103 kg (227 lb 2.9 oz)     Temp Readings from Last 3 Encounters:   09/18/23 97.8 °F (36.6 °C) (Temporal)   09/28/22 97.9 °F (36.6 °C) (Oral)   04/17/20 98.7 °F (37.1 °C) (Oral)     BP Readings from Last 3 Encounters:   01/19/24 118/80   01/03/24 121/80   09/18/23 (!) 143/67     Pulse Readings from Last 3 Encounters:   01/19/24 91   01/03/24 89   09/18/23 93          Objective:    Physical Exam  Vitals and nursing note reviewed.   Constitutional:       Appearance: He is well-developed. He is obese.   HENT:      Head: Normocephalic.   Neck:      Thyroid: No thyromegaly.      Vascular: Normal carotid pulses. No carotid bruit, hepatojugular reflux or JVD.   Cardiovascular:      Rate and Rhythm: Normal rate and regular rhythm.      Pulses:           Radial pulses are 2+ on the right side and 2+ on the left side.        Dorsalis pedis pulses are 0 on the right side and 0 on the left side.        Posterior tibial pulses are 0 on the right side and 0 on the left side.      Heart sounds: S1 normal and S2 normal. Heart sounds not distant. No midsystolic click and no opening snap. No murmur heard.     No friction rub. No S3 or S4 sounds.   Pulmonary:      Effort: Pulmonary effort is normal.      Breath sounds: Normal breath sounds. No wheezing or rales.   Abdominal:      General: Bowel sounds are  normal. There is no distension or abdominal bruit.      Palpations: Abdomen is soft. There is no mass.      Tenderness: There is no abdominal tenderness.   Musculoskeletal:      Cervical back: Normal range of motion and neck supple.   Skin:     General: Skin is warm.   Neurological:      Mental Status: He is alert and oriented to person, place, and time.   Psychiatric:         Behavior: Behavior normal.         I have reviewed all pertinent labs and cardiac studies.      Chemistry        Component Value Date/Time     01/18/2024 1100    K 4.5 01/18/2024 1100     01/18/2024 1100    CO2 25 01/18/2024 1100    BUN 11 01/18/2024 1100    CREATININE 1.0 01/18/2024 1100     (H) 01/18/2024 1100        Component Value Date/Time    CALCIUM 9.3 01/18/2024 1100    ALKPHOS 73 01/18/2024 1100    AST 23 01/18/2024 1100    ALT 17 01/18/2024 1100    BILITOT 0.6 01/18/2024 1100    ESTGFRAFRICA >60 04/06/2022 0840    EGFRNONAA >60 04/06/2022 0840        Lab Results   Component Value Date    WBC 6.25 01/18/2024    HGB 17.4 01/18/2024    HCT 51.8 01/18/2024    MCV 94 01/18/2024     01/18/2024     Lab Results   Component Value Date    TSH 2.487 09/28/2022             Lab Results   Component Value Date    HGBA1C 5.7 11/01/2016     Lab Results   Component Value Date    CHOL 188 11/01/2016    CHOL 118 (L) 12/27/2007    CHOL 89 (L) 09/21/2006     Lab Results   Component Value Date    HDL 29 (L) 11/01/2016    HDL 24 (L) 12/27/2007    HDL 21.0 (L) 09/21/2006     Lab Results   Component Value Date    LDLCALC 109.8 11/01/2016    LDLCALC 57.8 (L) 12/27/2007    LDLCALC 29.6 (L) 09/21/2006     Lab Results   Component Value Date    TRIG 246 (H) 11/01/2016    TRIG 181 (H) 12/27/2007    TRIG 192 (H) 09/21/2006     Lab Results   Component Value Date    CHOLHDL 15.4 (L) 11/01/2016    CHOLHDL 20.3 12/27/2007    CHOLHDL 23.6 09/21/2006           Results for orders placed during the hospital encounter of  08/18/23    Echo    Interpretation Summary    Left Ventricle: The left ventricle is normal in size. Normal wall thickness. Normal wall motion. There is normal systolic function with a visually estimated ejection fraction of 55 - 70%. Grade II diastolic dysfunction.    Right Ventricle: Normal right ventricular cavity size. Wall thickness is normal. Right ventricle wall motion  is normal. Systolic function is normal.    Mitral Valve: There is no stenosis.    Tricuspid Valve: There is mild regurgitation.    IVC/SVC: Intermediate venous pressure at 8 mmHg.        Results for orders placed during the hospital encounter of 08/18/23    Nuclear Stress - Cardiology Interpreted    Interpretation Summary    Normal myocardial perfusion scan. There is no evidence of myocardial ischemia or infarction.    There is a  moderate intensity fixed perfusion abnormality in the apical and inferoapical wall of the left ventricle secondary to diaphragm attenuation.    The gated perfusion images showed an ejection fraction of 57% at rest. The gated perfusion images showed an ejection fraction of 66% post stress.    The ECG portion of the study is negative for ischemia.    The patient reported no chest pain during the stress test.    During stress, occasional PACs are noted.        Assessment:       1. PAD (peripheral artery disease)    2. AP (angina pectoris)    3. Aneurysm of ascending aorta without rupture    4. Abnormal ECG    5. At risk for sleep apnea    6. Atypical chest pain    7. Chronic fatigue    8. Class 1 obesity due to excess calories with serious comorbidity and body mass index (BMI) of 33.0 to 33.9 in adult    9. IRIZARRY (dyspnea on exertion)    10. Essential hypertension    11. Tobacco abuse    12. RBBB    13. Coronary artery disease of native artery of native heart with stable angina pectoris    14. Diastolic dysfunction         Plan:           Discussed his PAD in detail, severe PAD R LE.  Pt however does not seem to have  claudication sxs or ulcers R LE.  Discussed indications for runoff and intervention.  Risks/benefits of abd aortogram w runoff and PTA/stenting discussed.  For now in absence of any lifestyle limiting sxs, will tx medically and observation for now.  Add 81 mg asa qd.  Chest pain/angina:  no clear ischemia on Aug 2023 nuclear stress test.  Sublingual nitroglycerin 0.4 mg for concerning chest pain episodes or breakthrough angina.  Patient advised of indications, side effects of nitroglycerin and when to report to ER.   Add Imdur 30 mg qd.  IRIZARRY: multifactorial -- CAD, obesity, deconditioning, etc and likely has LETICIA, and has COPD.  Aortic aneurysm: Stable on Sept 2022 CTA.  Will reevaluate with f/u CT scan in future.  Will need eventual referral to CV surgery again.  Reviewed all tests and above medical conditions with patient in detail and formulated treatment plan.  Continue optimal medical treatment for cardiovascular conditions.  Cardiac low salt diet advised.  Daily exercise encouraged, with the goal 30 +  minutes aerobic exercise as tolerated.  Maintaining healthy weight and weight loss goals (if needed) were discussed in clinic.  HTN: Need for BP control and HTN goals (if needed) were discussed and tx plan formulated.  Goal < 130/80.  Continue current HTN meds.  Importance of optimal lipid control were discussed in detail as well as possible pharmacologic and lifestyle changes that may be needed.  Not sure he is on statin or not.  He will review meds and call us.  Tobacco abuse: smoking cessation advised.  Abnl ecg: Stable. Monitor.  DM: optimal HGAIC control advised.  Continue current DM meds and f/u w PCP for mgt.  Liver transplant:  f/u with GI/Hepatology service as advised.         F/u in 4 months.      I have reviewed all pertinent labs and cardiac studies independently. Plans and recommendations have been formulated under my direct supervision. All questions answered and patient voiced understanding.

## 2024-01-19 ENCOUNTER — LAB VISIT (OUTPATIENT)
Dept: LAB | Facility: HOSPITAL | Age: 69
End: 2024-01-19
Attending: SPECIALIST
Payer: MEDICARE

## 2024-01-19 ENCOUNTER — OFFICE VISIT (OUTPATIENT)
Dept: CARDIOLOGY | Facility: CLINIC | Age: 69
End: 2024-01-19
Payer: MEDICARE

## 2024-01-19 VITALS
SYSTOLIC BLOOD PRESSURE: 118 MMHG | HEART RATE: 91 BPM | DIASTOLIC BLOOD PRESSURE: 80 MMHG | WEIGHT: 223.56 LBS | BODY MASS INDEX: 32.08 KG/M2 | OXYGEN SATURATION: 96 %

## 2024-01-19 DIAGNOSIS — E78.5 HYPERLIPEMIA: ICD-10-CM

## 2024-01-19 DIAGNOSIS — I25.118 CORONARY ARTERY DISEASE OF NATIVE ARTERY OF NATIVE HEART WITH STABLE ANGINA PECTORIS: ICD-10-CM

## 2024-01-19 DIAGNOSIS — R07.89 ATYPICAL CHEST PAIN: ICD-10-CM

## 2024-01-19 DIAGNOSIS — I20.9 AP (ANGINA PECTORIS): ICD-10-CM

## 2024-01-19 DIAGNOSIS — E66.09 CLASS 1 OBESITY DUE TO EXCESS CALORIES WITH SERIOUS COMORBIDITY AND BODY MASS INDEX (BMI) OF 33.0 TO 33.9 IN ADULT: ICD-10-CM

## 2024-01-19 DIAGNOSIS — I51.89 DIASTOLIC DYSFUNCTION: ICD-10-CM

## 2024-01-19 DIAGNOSIS — F45.8 ANXIETY HYPERVENTILATION: ICD-10-CM

## 2024-01-19 DIAGNOSIS — I10 ESSENTIAL HYPERTENSION: ICD-10-CM

## 2024-01-19 DIAGNOSIS — F41.9 ANXIETY HYPERVENTILATION: ICD-10-CM

## 2024-01-19 DIAGNOSIS — Z78.9 AMERICAN DIABETES ASSOCIATION (ADA) 1100 CALORIE DIET: Primary | ICD-10-CM

## 2024-01-19 DIAGNOSIS — Z94.4 LIVER REPLACED BY TRANSPLANT: ICD-10-CM

## 2024-01-19 DIAGNOSIS — N40.1 ENLARGED PROSTATE WITH URINARY OBSTRUCTION: ICD-10-CM

## 2024-01-19 DIAGNOSIS — R53.82 CHRONIC FATIGUE: ICD-10-CM

## 2024-01-19 DIAGNOSIS — Z91.89 AT RISK FOR SLEEP APNEA: ICD-10-CM

## 2024-01-19 DIAGNOSIS — Z72.0 TOBACCO ABUSE: ICD-10-CM

## 2024-01-19 DIAGNOSIS — R94.31 ABNORMAL ECG: ICD-10-CM

## 2024-01-19 DIAGNOSIS — F03.90 SENILE DEMENTIA, UNCOMPLICATED: ICD-10-CM

## 2024-01-19 DIAGNOSIS — F33.9 RECURRENT MAJOR DEPRESSION: ICD-10-CM

## 2024-01-19 DIAGNOSIS — I73.9 PAD (PERIPHERAL ARTERY DISEASE): Primary | ICD-10-CM

## 2024-01-19 DIAGNOSIS — N13.8 ENLARGED PROSTATE WITH URINARY OBSTRUCTION: ICD-10-CM

## 2024-01-19 DIAGNOSIS — F51.04 PSYCHOPHYSIOLOGICAL INSOMNIA: ICD-10-CM

## 2024-01-19 DIAGNOSIS — I71.21 ANEURYSM OF ASCENDING AORTA WITHOUT RUPTURE: ICD-10-CM

## 2024-01-19 DIAGNOSIS — R06.09 DOE (DYSPNEA ON EXERTION): ICD-10-CM

## 2024-01-19 DIAGNOSIS — E11.69 DIABETES MELLITUS ASSOCIATED WITH HORMONAL ETIOLOGY: ICD-10-CM

## 2024-01-19 DIAGNOSIS — I45.10 RBBB: ICD-10-CM

## 2024-01-19 LAB
ALBUMIN SERPL BCP-MCNC: 4.1 G/DL (ref 3.5–5.2)
ALBUMIN SERPL BCP-MCNC: 4.1 G/DL (ref 3.5–5.2)
ALP SERPL-CCNC: 74 U/L (ref 55–135)
ALT SERPL W/O P-5'-P-CCNC: 18 U/L (ref 10–44)
ANION GAP SERPL CALC-SCNC: 10 MMOL/L (ref 8–16)
ANION GAP SERPL CALC-SCNC: 10 MMOL/L (ref 8–16)
AST SERPL-CCNC: 23 U/L (ref 10–40)
BASOPHILS # BLD AUTO: 0.1 K/UL (ref 0–0.2)
BASOPHILS NFR BLD: 1.7 % (ref 0–1.9)
BILIRUB SERPL-MCNC: 0.6 MG/DL (ref 0.1–1)
BUN SERPL-MCNC: 11 MG/DL (ref 8–23)
BUN SERPL-MCNC: 11 MG/DL (ref 8–23)
CALCIUM SERPL-MCNC: 9.4 MG/DL (ref 8.7–10.5)
CALCIUM SERPL-MCNC: 9.4 MG/DL (ref 8.7–10.5)
CHLORIDE SERPL-SCNC: 102 MMOL/L (ref 95–110)
CHLORIDE SERPL-SCNC: 102 MMOL/L (ref 95–110)
CHOLEST SERPL-MCNC: 153 MG/DL (ref 120–199)
CHOLEST/HDLC SERPL: 4.6 {RATIO} (ref 2–5)
CO2 SERPL-SCNC: 22 MMOL/L (ref 23–29)
CO2 SERPL-SCNC: 22 MMOL/L (ref 23–29)
CREAT SERPL-MCNC: 1 MG/DL (ref 0.5–1.4)
CREAT SERPL-MCNC: 1 MG/DL (ref 0.5–1.4)
DIFFERENTIAL METHOD BLD: ABNORMAL
EOSINOPHIL # BLD AUTO: 0.2 K/UL (ref 0–0.5)
EOSINOPHIL NFR BLD: 3 % (ref 0–8)
ERYTHROCYTE [DISTWIDTH] IN BLOOD BY AUTOMATED COUNT: 13.7 % (ref 11.5–14.5)
EST. GFR  (NO RACE VARIABLE): >60 ML/MIN/1.73 M^2
EST. GFR  (NO RACE VARIABLE): >60 ML/MIN/1.73 M^2
ESTIMATED AVG GLUCOSE: 255 MG/DL (ref 68–131)
GLUCOSE SERPL-MCNC: 255 MG/DL (ref 70–110)
GLUCOSE SERPL-MCNC: 255 MG/DL (ref 70–110)
HBA1C MFR BLD: 10.5 % (ref 4–5.6)
HCT VFR BLD AUTO: 54.7 % (ref 40–54)
HDLC SERPL-MCNC: 33 MG/DL (ref 40–75)
HDLC SERPL: 21.6 % (ref 20–50)
HGB BLD-MCNC: 17.5 G/DL (ref 14–18)
IMM GRANULOCYTES # BLD AUTO: 0.04 K/UL (ref 0–0.04)
IMM GRANULOCYTES NFR BLD AUTO: 0.7 % (ref 0–0.5)
LDLC SERPL CALC-MCNC: 57.2 MG/DL (ref 63–159)
LYMPHOCYTES # BLD AUTO: 1.4 K/UL (ref 1–4.8)
LYMPHOCYTES NFR BLD: 23.8 % (ref 18–48)
MCH RBC QN AUTO: 31.4 PG (ref 27–31)
MCHC RBC AUTO-ENTMCNC: 32 G/DL (ref 32–36)
MCV RBC AUTO: 98 FL (ref 82–98)
MONOCYTES # BLD AUTO: 0.6 K/UL (ref 0.3–1)
MONOCYTES NFR BLD: 9.6 % (ref 4–15)
NEUTROPHILS # BLD AUTO: 3.7 K/UL (ref 1.8–7.7)
NEUTROPHILS NFR BLD: 61.2 % (ref 38–73)
NONHDLC SERPL-MCNC: 120 MG/DL
NRBC BLD-RTO: 0 /100 WBC
PHOSPHATE SERPL-MCNC: 2.8 MG/DL (ref 2.7–4.5)
PLATELET # BLD AUTO: 209 K/UL (ref 150–450)
PMV BLD AUTO: 12.2 FL (ref 9.2–12.9)
POTASSIUM SERPL-SCNC: 4.4 MMOL/L (ref 3.5–5.1)
POTASSIUM SERPL-SCNC: 4.4 MMOL/L (ref 3.5–5.1)
PROT SERPL-MCNC: 7.5 G/DL (ref 6–8.4)
RBC # BLD AUTO: 5.58 M/UL (ref 4.6–6.2)
SODIUM SERPL-SCNC: 134 MMOL/L (ref 136–145)
SODIUM SERPL-SCNC: 134 MMOL/L (ref 136–145)
TACROLIMUS BLD-MCNC: 5 NG/ML (ref 5–15)
TRIGL SERPL-MCNC: 314 MG/DL (ref 30–150)
TSH SERPL DL<=0.005 MIU/L-ACNC: 2.88 UIU/ML (ref 0.4–4)
WBC # BLD AUTO: 6.02 K/UL (ref 3.9–12.7)

## 2024-01-19 PROCEDURE — 84443 ASSAY THYROID STIM HORMONE: CPT

## 2024-01-19 PROCEDURE — 83036 HEMOGLOBIN GLYCOSYLATED A1C: CPT

## 2024-01-19 PROCEDURE — 99999 PR PBB SHADOW E&M-EST. PATIENT-LVL III: CPT | Mod: PBBFAC,,, | Performed by: INTERNAL MEDICINE

## 2024-01-19 PROCEDURE — 84100 ASSAY OF PHOSPHORUS: CPT

## 2024-01-19 PROCEDURE — 99215 OFFICE O/P EST HI 40 MIN: CPT | Mod: S$GLB,,, | Performed by: INTERNAL MEDICINE

## 2024-01-19 PROCEDURE — 80053 COMPREHEN METABOLIC PANEL: CPT | Mod: 91

## 2024-01-19 PROCEDURE — 80061 LIPID PANEL: CPT

## 2024-01-19 PROCEDURE — 36415 COLL VENOUS BLD VENIPUNCTURE: CPT | Mod: PO | Performed by: SPECIALIST

## 2024-01-19 PROCEDURE — 85025 COMPLETE CBC W/AUTO DIFF WBC: CPT | Mod: 91

## 2024-01-19 RX ORDER — TRIAMCINOLONE ACETONIDE 1 MG/ML
LOTION TOPICAL
COMMUNITY
Start: 2024-01-03

## 2024-01-19 RX ORDER — EMPAGLIFLOZIN 10 MG/1
TABLET, FILM COATED ORAL
COMMUNITY
Start: 2024-01-17 | End: 2024-04-16

## 2024-01-19 RX ORDER — MUPIROCIN 20 MG/G
OINTMENT TOPICAL
COMMUNITY
Start: 2024-01-17

## 2024-01-19 RX ORDER — ISOSORBIDE MONONITRATE 30 MG/1
30 TABLET, EXTENDED RELEASE ORAL DAILY
Qty: 90 TABLET | Refills: 3 | Status: SHIPPED | OUTPATIENT
Start: 2024-01-19 | End: 2025-01-18

## 2024-01-19 RX ORDER — ASPIRIN 81 MG/1
81 TABLET ORAL DAILY
Qty: 30 TABLET | Refills: 12 | Status: SHIPPED | OUTPATIENT
Start: 2024-01-19 | End: 2025-01-18

## 2024-01-22 ENCOUNTER — TELEPHONE (OUTPATIENT)
Dept: TRANSPLANT | Facility: CLINIC | Age: 69
End: 2024-01-22
Payer: MEDICARE

## 2024-01-22 ENCOUNTER — TELEPHONE (OUTPATIENT)
Dept: CARDIOLOGY | Facility: CLINIC | Age: 69
End: 2024-01-22
Payer: MEDICARE

## 2024-01-22 NOTE — TELEPHONE ENCOUNTER
Scott Moncada MD [place], [place]  Caller: Unspecified (3 days ago,  4:57 PM)  Ok please call him back and stay on fish oil for now.    Dr Moncada    Called patient to advise per Dr. Moncada no answer left vm with instructions to keep taking

## 2024-01-22 NOTE — LETTER
January 22, 2024    Chapo Jacobo  [address] [facility] [address]          Dear Chaporin Jacobo:  MRN: [id number]    This is a follow up to your recent labs, your lab results were stable.  There are no medicine changes.  Please have your labs drawn again on 4/8/2024, while seeing Dr Plascencia same day.      If you cannot have your labs drawn on the scheduled date, it is your responsibility to call the transplant department to reschedule.  Please call [phone] and ask to speak to Racheal FLOWER -  for all scheduling requests.     Sincerely,  Shanice VILLATORON, RN        Your Liver Transplant Coordinator    [facility]  [address] [place], [place] [id number] [phone]       
Regional

## 2024-01-22 NOTE — TELEPHONE ENCOUNTER
Letter sent to patient stating: Your labs have been reviewed by your Transplant physician, no action required. Next labs due 4/8/2024        ----- Message from Viviana Plascencia MD sent at 1/20/2024  8:32 PM CST -----  Reviewed, nothing to do; repeat per routine

## 2024-02-09 NOTE — TELEPHONE ENCOUNTER
----- Message from Chloe Harry MA sent at 1/11/2018 10:02 AM CST -----  Contact: self       ----- Message -----  From: Chyna Bowman  Sent: 1/11/2018   9:42 AM  To: Eric ORTIZ Staff    Patient would like to know test results. Please call back at [phone].        Thanks,  Chyna Bowman     None

## 2024-02-29 ENCOUNTER — OFFICE VISIT (OUTPATIENT)
Dept: PODIATRY | Facility: CLINIC | Age: 69
End: 2024-02-29
Payer: MEDICARE

## 2024-02-29 DIAGNOSIS — E11.9 ENCOUNTER FOR COMPREHENSIVE DIABETIC FOOT EXAMINATION, TYPE 2 DIABETES MELLITUS: Primary | ICD-10-CM

## 2024-02-29 DIAGNOSIS — E11.65 UNCONTROLLED TYPE 2 DIABETES MELLITUS WITH HYPERGLYCEMIA: ICD-10-CM

## 2024-02-29 DIAGNOSIS — D84.9 IMMUNOSUPPRESSION: ICD-10-CM

## 2024-02-29 DIAGNOSIS — E11.49 TYPE 2 DIABETES MELLITUS WITH OTHER NEUROLOGIC COMPLICATION, WITHOUT LONG-TERM CURRENT USE OF INSULIN: ICD-10-CM

## 2024-02-29 DIAGNOSIS — Z94.4 LIVER REPLACED BY TRANSPLANT: ICD-10-CM

## 2024-02-29 DIAGNOSIS — L60.3 ONYCHODYSTROPHY: ICD-10-CM

## 2024-02-29 PROCEDURE — 99213 OFFICE O/P EST LOW 20 MIN: CPT | Mod: 25,S$GLB,, | Performed by: PODIATRIST

## 2024-02-29 PROCEDURE — 99999 PR PBB SHADOW E&M-EST. PATIENT-LVL III: CPT | Mod: PBBFAC,,, | Performed by: PODIATRIST

## 2024-02-29 PROCEDURE — 11721 DEBRIDE NAIL 6 OR MORE: CPT | Mod: Q9,S$GLB,, | Performed by: PODIATRIST

## 2024-02-29 NOTE — PROGRESS NOTES
Subjective:       Patient ID: Chapo Jacobo is a 68 y.o. male.    Chief Complaint: Diabetic Foot Exam (Patient is a diabetic and was last sen on 02.19.24 by Sj Escudero. He denies pain at present. )      HPI: Chapo Jacobo presents to the office today, under referral by , Sj Escudero MD, for his annual diabetic foot assessment and risk evaluation.  Patient is a DMII.  Patient complains of elongated, thickened toenails to the right foot left foot causing increased pain and discomfort with ambulation.  Denies any recent falls or injuries.  States tolerating medication well.  Does admit that A1c has been uncontrolled. This patient last saw his/her internal/family medicine physician on 02/19/2024.     Hemoglobin A1C   Date Value Ref Range Status   01/18/2024 10.5 (H) 4.0 - 5.6 % Final     Comment:     ADA Screening Guidelines:  5.7-6.4%  Consistent with prediabetes  >or=6.5%  Consistent with diabetes    High levels of fetal hemoglobin interfere with the HbA1C  assay. Heterozygous hemoglobin variants (HbS, HgC, etc)do  not significantly interfere with this assay.   However, presence of multiple variants may affect accuracy.     11/01/2016 5.7 4.5 - 6.2 % Final     Comment:     According to ADA guidelines, hemoglobin A1C <7.0% represents  optimal control in non-pregnant diabetic patients.  Different  metrics may apply to specific populations.   Standards of Medical Care in Diabetes - 2016.  For the purpose of screening for the presence of diabetes:  <5.7%     Consistent with the absence of diabetes  5.7-6.4%  Consistent with increasing risk for diabetes   (prediabetes)  >or=6.5%  Consistent with diabetes  Currently no consensus exists for use of hemoglobin A1C  for diagnosis of diabetes for children.     02/16/2006 6.3 (H) 4.5 - 6.2 % Final   .    Review of patient's allergies indicates:   Allergen Reactions    Darvocet a500 [propoxyphene n-acetaminophen]     Paroxetine hcl Other (See Comments)      Other reaction(s): Unknown  Other reaction(s): Unknown  paranoid    Penicillins Other (See Comments)     Other reaction(s): Anaphylaxis  Other reaction(s): Anaphylaxis  As child    Codeine Itching     Other reaction(s): Unknown  Other reaction(s): Unknown       Past Medical History:   Diagnosis Date    Anxiety     Appendicitis     Coronary artery disease of native artery of native heart with stable angina pectoris 01/19/2024    Depression     Diabetes mellitus     Diastolic dysfunction 01/19/2024    Encounter for blood transfusion     Gallbladder & bile duct stone with obstruction     Herpes     PAD (peripheral artery disease) 07/07/2023    Sleep apnea        Family History   Problem Relation Age of Onset    Arthritis Mother     Melanoma Father     Cancer Father         melanoma    Fibromyalgia Sister     Cancer Maternal Grandfather         unknown type       Social History     Socioeconomic History    Marital status: Single   Tobacco Use    Smoking status: Every Day     Current packs/day: 1.00     Average packs/day: 1 pack/day for 46.0 years (46.0 ttl pk-yrs)     Types: Cigarettes    Smokeless tobacco: Never   Substance and Sexual Activity    Alcohol use: Yes     Alcohol/week: 2.0 standard drinks of alcohol     Types: 2 Cans of beer per week    Drug use: No    Sexual activity: Yes     Partners: Female       Past Surgical History:   Procedure Laterality Date    ABDOMINAL SURGERY      APPENDECTOMY      bilateral rotator cuff surgery      BRAIN SURGERY      CERVICAL FUSION      CHOLECYSTECTOMY      COLONOSCOPY N/A 7/10/2017    Procedure: COLONOSCOPY;  Surgeon: Viviana Reyes MD;  Location: [facility];  Service: Endoscopy;  Laterality: N/A;    LIVER TRANSPLANT      LUMBAR DISC SURGERY      SELECTIVE INJECTION OF ANESTHETIC AGENT AROUND LUMBAR SPINAL NERVE ROOT BY TRANSFORAMINAL APPROACH Bilateral 9/18/2023    Procedure: Bilateral L4/5 TF JULIUS;  Surgeon: Bill Read MD;  [facility];  Service: Pain  Management;  Laterality: Bilateral;    TONSILLECTOMY         Review of Systems        Objective:   There were no vitals taken for this visit.    Physical Exam  LOWER EXTREMITY PHYSICAL EXAMINATION    ORTHOPEDIC:  No pain on palpation of the foot or ankle.   Range of motion within normal limits of the ankle joint, rearfoot, and forefoot.  Manual muscle strength testing is 5/5 with dorsiflexion, plantar flexion, abduction and abduction of the lower extremity.  No pain with or without resistance.  The patient is a full to ambulate without pain or discomfort.  The patient's gait is non antalgic.  The patient does not utilize any assistive device for ambulation.    VASCULAR:  The right dorsalis pedis pulse 2/4 and the right posterior tibial pulse 1/4.  The left dorsalis pedis pulse 2/4 and posterior tibial pulse on the left is 1/4.  Capillary refill is intact.  Pedal hair growth decreased.     NEUROLOGY:  Protective sensation is intact with [place] monofilament. Proprioception is intact. Intact sensation to light touch.  Vibratory sensation is diminished to the 1st metatarsal phalangeal joint.      DERMATOLOGY:  Skin is supple, moist, intact.  There is no signs of callusing, ulcerations, other lesions identified to the dorsal or plantar aspect of the right or left foot.  The R1, 2, 5 and left L1,2, 5 are thickened, discolored dystrophic.  There is subungual debris.  Nail plates have area of dark discoloration.  The remaining nails 3-4 on the right foot and the left foot are elongated but of normal color, thickness, and texture.   There is no signs of ingrowing into the medial or lateral borders.  There is no evidence of wounds or skin breakdown.  No edema or erythema.  No obvious lacerations or fissuring.  Interdigital spaces are clean, dry, intact.  No rashes or scars appreciated.    Assessment:     1. Encounter for comprehensive diabetic foot examination, type 2 diabetes mellitus    2. Type 2 diabetes mellitus  with other neurologic complication, without long-term current use of insulin    3. Uncontrolled type 2 diabetes mellitus with hyperglycemia    4. Immunosuppression    5. Liver replaced by transplant    6. Onychodystrophy        Plan:     Encounter for comprehensive diabetic foot examination, type 2 diabetes mellitus    Type 2 diabetes mellitus with other neurologic complication, without long-term current use of insulin    Uncontrolled type 2 diabetes mellitus with hyperglycemia    Immunosuppression    Liver replaced by transplant    Onychodystrophy      I counseled the patient on his/her Diabetic Mellitus regarding today's clinical examination and objection findings. We did also discuss recent medication changes, pertinent labs and imaging evaluations and other medical consultation notes and progress notes. Greater than 50% of this visit was spent on counseling and coordination of care. Greater than 20 minutes of this appt. was spent on education about the diabetic foot, in relation to PVD and/or neuropathy, and the prevention of limb loss.     Shoe gear is inspected and wear and proper fit/type. Patient is reminded of the importance of good nutrition and blood sugar control to help prevent podiatric complications of diabetes. Patient instructed on proper foot hygeine. We discussed wearing proper shoe gear, daily foot inspections, never walking without protective shoe gear, never putting sharp instruments to feet.  Patient  will continue to monitor the areas daily, inspect feet, wear protective shoe gear when ambulatory, moisturizer to maintain skin integrity.     Patient's DMI/DMII is managed by Internal/Family Medicine Physician and/or Endocrinology Advanced Practice Provider.    Dystrophic nail plates, as outlined above (R#1,2,5  ; L#1,2,5 ), are sharply debrided with double action nail nipper, and/or with the assistance of a mechanical rotary jyoti, with removal of all offending nail and nail border(s), for  reduction of pains. Nails are reduced in terms of length, width and girth with removal of subungual debris to facilitate pain free weight bearing and ambulation. The elongated nails as outlined in the objective portion of this note, were trimmed to appropriate length, with a double action nail nipper, for alleviation/reduction of pains as well. Follow up in approx. 3-4 months.

## 2024-04-04 ENCOUNTER — TELEPHONE (OUTPATIENT)
Dept: TRANSPLANT | Facility: CLINIC | Age: 69
End: 2024-04-04
Payer: MEDICARE

## 2024-04-05 ENCOUNTER — TELEPHONE (OUTPATIENT)
Dept: TRANSPLANT | Facility: CLINIC | Age: 69
End: 2024-04-05
Payer: MEDICARE

## 2024-04-09 ENCOUNTER — LAB VISIT (OUTPATIENT)
Dept: LAB | Facility: HOSPITAL | Age: 69
End: 2024-04-09
Attending: INTERNAL MEDICINE
Payer: MEDICARE

## 2024-04-09 DIAGNOSIS — K74.60 HEPATIC CIRRHOSIS, UNSPECIFIED HEPATIC CIRRHOSIS TYPE, UNSPECIFIED WHETHER ASCITES PRESENT: ICD-10-CM

## 2024-04-09 DIAGNOSIS — Z94.4 LIVER REPLACED BY TRANSPLANT: ICD-10-CM

## 2024-04-09 LAB
ALBUMIN SERPL BCP-MCNC: 4.1 G/DL (ref 3.5–5.2)
ALP SERPL-CCNC: 79 U/L (ref 55–135)
ALT SERPL W/O P-5'-P-CCNC: 15 U/L (ref 10–44)
ANION GAP SERPL CALC-SCNC: 9 MMOL/L (ref 8–16)
AST SERPL-CCNC: 17 U/L (ref 10–40)
BASOPHILS # BLD AUTO: 0.1 K/UL (ref 0–0.2)
BASOPHILS NFR BLD: 1.2 % (ref 0–1.9)
BILIRUB SERPL-MCNC: 0.8 MG/DL (ref 0.1–1)
BUN SERPL-MCNC: 13 MG/DL (ref 8–23)
CALCIUM SERPL-MCNC: 9.8 MG/DL (ref 8.7–10.5)
CHLORIDE SERPL-SCNC: 102 MMOL/L (ref 95–110)
CO2 SERPL-SCNC: 28 MMOL/L (ref 23–29)
CREAT SERPL-MCNC: 1 MG/DL (ref 0.5–1.4)
DIFFERENTIAL METHOD BLD: ABNORMAL
EOSINOPHIL # BLD AUTO: 0.2 K/UL (ref 0–0.5)
EOSINOPHIL NFR BLD: 2.9 % (ref 0–8)
ERYTHROCYTE [DISTWIDTH] IN BLOOD BY AUTOMATED COUNT: 13.3 % (ref 11.5–14.5)
EST. GFR  (NO RACE VARIABLE): >60 ML/MIN/1.73 M^2
GLUCOSE SERPL-MCNC: 147 MG/DL (ref 70–110)
HCT VFR BLD AUTO: 54.8 % (ref 40–54)
HGB BLD-MCNC: 18.4 G/DL (ref 14–18)
IMM GRANULOCYTES # BLD AUTO: 0.04 K/UL (ref 0–0.04)
IMM GRANULOCYTES NFR BLD AUTO: 0.5 % (ref 0–0.5)
INR PPP: 1.1 (ref 0.8–1.2)
LYMPHOCYTES # BLD AUTO: 1.8 K/UL (ref 1–4.8)
LYMPHOCYTES NFR BLD: 22.1 % (ref 18–48)
MCH RBC QN AUTO: 31.7 PG (ref 27–31)
MCHC RBC AUTO-ENTMCNC: 33.6 G/DL (ref 32–36)
MCV RBC AUTO: 94 FL (ref 82–98)
MONOCYTES # BLD AUTO: 0.8 K/UL (ref 0.3–1)
MONOCYTES NFR BLD: 9.1 % (ref 4–15)
NEUTROPHILS # BLD AUTO: 5.4 K/UL (ref 1.8–7.7)
NEUTROPHILS NFR BLD: 64.2 % (ref 38–73)
NRBC BLD-RTO: 0 /100 WBC
PLATELET # BLD AUTO: 206 K/UL (ref 150–450)
PMV BLD AUTO: 11 FL (ref 9.2–12.9)
POTASSIUM SERPL-SCNC: 4.1 MMOL/L (ref 3.5–5.1)
PROT SERPL-MCNC: 7.6 G/DL (ref 6–8.4)
PROTHROMBIN TIME: 11.6 SEC (ref 9–12.5)
RBC # BLD AUTO: 5.81 M/UL (ref 4.6–6.2)
SODIUM SERPL-SCNC: 139 MMOL/L (ref 136–145)
WBC # BLD AUTO: 8.33 K/UL (ref 3.9–12.7)

## 2024-04-09 PROCEDURE — 85025 COMPLETE CBC W/AUTO DIFF WBC: CPT | Performed by: INTERNAL MEDICINE

## 2024-04-09 PROCEDURE — 80197 ASSAY OF TACROLIMUS: CPT | Performed by: INTERNAL MEDICINE

## 2024-04-09 PROCEDURE — 36415 COLL VENOUS BLD VENIPUNCTURE: CPT | Mod: PO | Performed by: INTERNAL MEDICINE

## 2024-04-09 PROCEDURE — 80053 COMPREHEN METABOLIC PANEL: CPT | Performed by: INTERNAL MEDICINE

## 2024-04-09 PROCEDURE — 85610 PROTHROMBIN TIME: CPT | Performed by: INTERNAL MEDICINE

## 2024-04-10 ENCOUNTER — TELEPHONE (OUTPATIENT)
Dept: TRANSPLANT | Facility: CLINIC | Age: 69
End: 2024-04-10
Payer: MEDICARE

## 2024-04-10 LAB — TACROLIMUS BLD-MCNC: 6.6 NG/ML (ref 5–15)

## 2024-04-30 DIAGNOSIS — Z94.4 LIVER TRANSPLANTED: Primary | ICD-10-CM

## 2024-05-31 ENCOUNTER — TELEPHONE (OUTPATIENT)
Dept: TRANSPLANT | Facility: HOSPITAL | Age: 69
End: 2024-05-31
Payer: MEDICARE

## 2024-05-31 NOTE — TELEPHONE ENCOUNTER
BERTRAM called pt to follow-up on reported concerns, no answer, BERTRAM left vm. BERTRAM also printed out list of state resources for assistance for elderly and mailed them to pt. Bertram sent referral to [place] [facility]. BERTRAM attached letter with resources and advised pt ask PCP to make another referral to Psychiatry.      ----- Message from Jennifer Patrick RN sent at 5/31/2024  9:47 AM CDT -----  Any recommendations?  ----- Message -----  From: Racheal Unger MA  Sent: 5/31/2024   9:46 AM CDT  To: [facility]    He is a [facility]ice patient.    He called me to ask to send a message due to he is with financial / memory and coping issues.  States he is overwhelmed with everyday life and needs to find out what he can do to get some type of assistance with an in home assistant.  States he needs help to get back in touch with life.  He states due to so many deaths in his family around the same time years ago, he has never really got his life together.    Told him I would send a message.  This maybe able to wait for Shanice on Monday.      Thank you.

## 2024-06-04 ENCOUNTER — TELEPHONE (OUTPATIENT)
Dept: TRANSPLANT | Facility: CLINIC | Age: 69
End: 2024-06-04
Payer: MEDICARE

## 2024-06-06 ENCOUNTER — OFFICE VISIT (OUTPATIENT)
Dept: URGENT CARE | Facility: CLINIC | Age: 69
End: 2024-06-06
Payer: MEDICARE

## 2024-06-06 VITALS
DIASTOLIC BLOOD PRESSURE: 67 MMHG | RESPIRATION RATE: 20 BRPM | TEMPERATURE: 98 F | WEIGHT: 227.31 LBS | SYSTOLIC BLOOD PRESSURE: 156 MMHG | HEIGHT: 70 IN | HEART RATE: 85 BPM | BODY MASS INDEX: 32.54 KG/M2 | OXYGEN SATURATION: 96 %

## 2024-06-06 DIAGNOSIS — L08.9 SKIN INFECTION: Primary | ICD-10-CM

## 2024-06-06 PROCEDURE — 99213 OFFICE O/P EST LOW 20 MIN: CPT | Mod: S$GLB,,, | Performed by: PHYSICIAN ASSISTANT

## 2024-06-06 RX ORDER — MUPIROCIN 20 MG/G
OINTMENT TOPICAL DAILY
Qty: 30 G | Refills: 0 | Status: SHIPPED | OUTPATIENT
Start: 2024-06-06 | End: 2024-06-16

## 2024-06-06 RX ORDER — PIOGLITAZONEHYDROCHLORIDE 15 MG/1
15 TABLET ORAL
COMMUNITY
Start: 2024-05-14

## 2024-06-06 RX ORDER — SULFAMETHOXAZOLE AND TRIMETHOPRIM 800; 160 MG/1; MG/1
1 TABLET ORAL 2 TIMES DAILY
Qty: 10 TABLET | Refills: 0 | Status: SHIPPED | OUTPATIENT
Start: 2024-06-06 | End: 2024-06-11

## 2024-06-06 RX ORDER — GABAPENTIN 100 MG/1
100 CAPSULE ORAL 3 TIMES DAILY
COMMUNITY
Start: 2024-05-31

## 2024-06-06 RX ORDER — EMPAGLIFLOZIN 25 MG/1
25 TABLET, FILM COATED ORAL
COMMUNITY
Start: 2024-05-01

## 2024-06-06 RX ORDER — OMEGA-3-ACID ETHYL ESTERS 1 G/1
1 CAPSULE, LIQUID FILLED ORAL 3 TIMES DAILY
COMMUNITY
Start: 2024-04-18

## 2024-06-06 RX ORDER — ROSUVASTATIN CALCIUM 5 MG/1
5 TABLET, COATED ORAL
COMMUNITY
Start: 2024-05-31

## 2024-06-06 NOTE — PROGRESS NOTES
"Subjective:      Patient ID: Chapo Jacobo is a 68 y.o. male.    Vitals:  height is 5' 10" (1.778 m) and weight is 103.1 kg (227 lb 4.7 oz). His tympanic temperature is 97.8 °F (36.6 °C). His blood pressure is 156/67 (abnormal) and his pulse is 85. His respiration is 20 and oxygen saturation is 96%.     Chief Complaint: Abrasion    Patient presents after a fall several moths ago. He has He has a couple sores on his Right knee that is not healing. He does admit to picking at it.  But they are are painful and mildly red at this point per patient.  Slight bloody drainage recently when he took off the scab.  Denies fever.    Knee Injury  This is a new problem. The current episode started more than 1 month ago. The problem occurs constantly. The problem has been unchanged. Pertinent negatives include no abdominal pain, anorexia, arthralgias, change in bowel habit, chest pain, chills, congestion, coughing, diaphoresis, fatigue, fever, headaches, joint swelling, myalgias, nausea, neck pain, numbness, rash, sore throat, swollen glands, urinary symptoms, vertigo, visual change, vomiting or weakness. Nothing aggravates the symptoms. He has tried nothing for the symptoms. The treatment provided no relief.     Constitution: Negative for chills, sweating, fatigue and fever.   HENT:  Negative for congestion and sore throat.    Neck: Negative for neck pain.   Cardiovascular:  Negative for chest pain.   Respiratory:  Negative for cough.    Gastrointestinal:  Negative for abdominal pain, nausea and vomiting.   Musculoskeletal:  Negative for joint pain, joint swelling and muscle ache.   Skin:  Negative for rash and erythema.   Neurological:  Negative for history of vertigo, headaches and numbness.      Objective:     Physical Exam   Constitutional: He is oriented to person, place, and time. He appears well-developed.   HENT:   Head: Normocephalic and atraumatic. Head is without abrasion, without contusion and without laceration. "   Ears:   Right Ear: External ear normal.   Left Ear: External ear normal.   Nose: Nose normal.   Mouth/Throat: Oropharynx is clear and moist and mucous membranes are normal.   Eyes: Conjunctivae, EOM and lids are normal. Pupils are equal, round, and reactive to light.   Neck: Trachea normal and phonation normal. Neck supple.   Cardiovascular: Normal rate, regular rhythm and normal heart sounds.   Pulmonary/Chest: Effort normal and breath sounds normal. No stridor. No respiratory distress.   Musculoskeletal: Normal range of motion.         General: Normal range of motion.   Neurological: He is alert and oriented to person, place, and time.   Skin: Skin is warm, dry, intact and no rash. Capillary refill takes less than 2 seconds. No abrasion, No burn, No bruising, No erythema and No ecchymosis        Psychiatric: His speech is normal and behavior is normal. Judgment and thought content normal.   Nursing note and vitals reviewed.      Assessment:     1. Skin infection        Plan:       Skin infection  -     mupirocin (BACTROBAN) 2 % ointment; Apply topically once daily. for 10 days  Dispense: 30 g; Refill: 0  -     sulfamethoxazole-trimethoprim 800-160mg (BACTRIM DS) 800-160 mg Tab; Take 1 tablet by mouth 2 (two) times daily. for 5 days  Dispense: 10 tablet; Refill: 0        Bactrim PO BID for 5 days.  Topical Bactroban 1-2 times per day.  Keep area covered to keep from picking at the lesions.  RTC as needed.  Follow up with [facility].

## 2024-06-07 DIAGNOSIS — D84.9 IMMUNOSUPPRESSION: ICD-10-CM

## 2024-06-07 DIAGNOSIS — Z94.4 LIVER TRANSPLANTED: Primary | ICD-10-CM

## 2024-06-07 DIAGNOSIS — K74.60 HEPATIC CIRRHOSIS, UNSPECIFIED HEPATIC CIRRHOSIS TYPE, UNSPECIFIED WHETHER ASCITES PRESENT: ICD-10-CM

## 2024-06-10 ENCOUNTER — LAB VISIT (OUTPATIENT)
Dept: LAB | Facility: HOSPITAL | Age: 69
End: 2024-06-10
Attending: INTERNAL MEDICINE
Payer: MEDICARE

## 2024-06-10 DIAGNOSIS — D84.9 IMMUNOSUPPRESSION: ICD-10-CM

## 2024-06-10 DIAGNOSIS — Z94.4 LIVER TRANSPLANTED: ICD-10-CM

## 2024-06-10 DIAGNOSIS — K74.60 HEPATIC CIRRHOSIS, UNSPECIFIED HEPATIC CIRRHOSIS TYPE, UNSPECIFIED WHETHER ASCITES PRESENT: ICD-10-CM

## 2024-06-10 LAB
AFP SERPL-MCNC: 2.1 NG/ML (ref 0–8.4)
ALBUMIN SERPL BCP-MCNC: 4 G/DL (ref 3.5–5.2)
ALP SERPL-CCNC: 66 U/L (ref 55–135)
ALT SERPL W/O P-5'-P-CCNC: 15 U/L (ref 10–44)
ANION GAP SERPL CALC-SCNC: 10 MMOL/L (ref 8–16)
AST SERPL-CCNC: 16 U/L (ref 10–40)
BASOPHILS # BLD AUTO: 0.08 K/UL (ref 0–0.2)
BASOPHILS NFR BLD: 1.2 % (ref 0–1.9)
BILIRUB SERPL-MCNC: 0.7 MG/DL (ref 0.1–1)
BUN SERPL-MCNC: 19 MG/DL (ref 8–23)
CALCIUM SERPL-MCNC: 9.4 MG/DL (ref 8.7–10.5)
CHLORIDE SERPL-SCNC: 104 MMOL/L (ref 95–110)
CO2 SERPL-SCNC: 24 MMOL/L (ref 23–29)
CREAT SERPL-MCNC: 1.1 MG/DL (ref 0.5–1.4)
DIFFERENTIAL METHOD BLD: ABNORMAL
EOSINOPHIL # BLD AUTO: 0.2 K/UL (ref 0–0.5)
EOSINOPHIL NFR BLD: 3.1 % (ref 0–8)
ERYTHROCYTE [DISTWIDTH] IN BLOOD BY AUTOMATED COUNT: 14.1 % (ref 11.5–14.5)
EST. GFR  (NO RACE VARIABLE): >60 ML/MIN/1.73 M^2
GLUCOSE SERPL-MCNC: 172 MG/DL (ref 70–110)
HCT VFR BLD AUTO: 49 % (ref 40–54)
HGB BLD-MCNC: 16.8 G/DL (ref 14–18)
IMM GRANULOCYTES # BLD AUTO: 0.03 K/UL (ref 0–0.04)
IMM GRANULOCYTES NFR BLD AUTO: 0.4 % (ref 0–0.5)
INR PPP: 1.1 (ref 0.8–1.2)
LYMPHOCYTES # BLD AUTO: 1.8 K/UL (ref 1–4.8)
LYMPHOCYTES NFR BLD: 26.1 % (ref 18–48)
MCH RBC QN AUTO: 32.1 PG (ref 27–31)
MCHC RBC AUTO-ENTMCNC: 34.3 G/DL (ref 32–36)
MCV RBC AUTO: 94 FL (ref 82–98)
MONOCYTES # BLD AUTO: 0.7 K/UL (ref 0.3–1)
MONOCYTES NFR BLD: 9.8 % (ref 4–15)
NEUTROPHILS # BLD AUTO: 4 K/UL (ref 1.8–7.7)
NEUTROPHILS NFR BLD: 59.4 % (ref 38–73)
NRBC BLD-RTO: 0 /100 WBC
PLATELET # BLD AUTO: 215 K/UL (ref 150–450)
PMV BLD AUTO: 12.2 FL (ref 9.2–12.9)
POTASSIUM SERPL-SCNC: 4.3 MMOL/L (ref 3.5–5.1)
PROT SERPL-MCNC: 7.2 G/DL (ref 6–8.4)
PROTHROMBIN TIME: 12.1 SEC (ref 9–12.5)
RBC # BLD AUTO: 5.24 M/UL (ref 4.6–6.2)
SODIUM SERPL-SCNC: 138 MMOL/L (ref 136–145)
WBC # BLD AUTO: 6.81 K/UL (ref 3.9–12.7)

## 2024-06-10 PROCEDURE — 36415 COLL VENOUS BLD VENIPUNCTURE: CPT | Mod: PO | Performed by: INTERNAL MEDICINE

## 2024-06-10 PROCEDURE — 85025 COMPLETE CBC W/AUTO DIFF WBC: CPT | Performed by: INTERNAL MEDICINE

## 2024-06-10 PROCEDURE — 85610 PROTHROMBIN TIME: CPT | Performed by: INTERNAL MEDICINE

## 2024-06-10 PROCEDURE — 80197 ASSAY OF TACROLIMUS: CPT | Performed by: INTERNAL MEDICINE

## 2024-06-10 PROCEDURE — 82105 ALPHA-FETOPROTEIN SERUM: CPT | Performed by: INTERNAL MEDICINE

## 2024-06-10 PROCEDURE — 80053 COMPREHEN METABOLIC PANEL: CPT | Performed by: INTERNAL MEDICINE

## 2024-06-11 LAB — TACROLIMUS BLD-MCNC: 7.5 NG/ML (ref 5–15)

## 2024-06-12 ENCOUNTER — TELEPHONE (OUTPATIENT)
Dept: PODIATRY | Facility: CLINIC | Age: 69
End: 2024-06-12
Payer: MEDICARE

## 2024-06-12 NOTE — TELEPHONE ENCOUNTER
Spoke with patient about rescheduling appt. Call ended pleasantly     ----- Message from Roxana Lake sent at 6/12/2024  1:24 PM CDT -----  .Type:  Sooner Apoointment Request    Caller is requesting a sooner appointment.  Caller declined first available appointment listed below.  Caller will not accept being placed on the waitlist and is requesting a message be sent to doctor.  Name of Caller:.Chapo Jacobo   When is the first available appointment?06/13/2024  Symptoms:nail care  Would the patient rather a call back or a response via [facility]? Call back  Best Call Back Number:.[phone]    Additional Information:Tomorrow is great but he need an afternoon appointment after the ultrasound. He is requesting a call back at [phone]. x.EL

## 2024-06-14 DIAGNOSIS — Z94.4 LIVER TRANSPLANTED: ICD-10-CM

## 2024-06-14 DIAGNOSIS — D84.9 IMMUNOSUPPRESSION: ICD-10-CM

## 2024-06-14 NOTE — LETTER
June 14, 2024    Chaporin FaganOdalis  [address] [facility] [address]          Dear Chapo Jacobo:  MRN: [id number]    This is a follow up to your recent labs.  Please decrease your tacrolimus to where you take 2mg orally in the AM but only 1mg orally in the PM (twelve hours apart).  Please have your labs drawn again on 7/8/24.      If you cannot have your labs drawn on the scheduled date, it is your responsibility to call the transplant department to reschedule.  Please call [phone] and ask to speak to Racheal FLOWER -  for all scheduling requests.     Sincerely,    Shanice VILLATORON, RN      Your Liver Transplant Coordinator    [facility]  [address] [place] [id number] [phone]

## 2024-06-14 NOTE — TELEPHONE ENCOUNTER
Writer left patient voicemail with below tac dose decrease and request for labs 24.    Letter also being sent out with this information as well.      ---- Message from Viviana Plascencia MD sent at 2024  4:54 PM CDT -----  Decrease tacro to 2mg in the morning and 1mg in the eveving, repeat labs in 2 weeks    MELD 3.0: 8 at 6/10/2024  9:47 AM  MELD-Na: 8 at 6/10/2024  9:47 AM  Calculated from:  Serum Creatinine: 1.1 mg/dL at 6/10/2024  9:47 AM  Serum Sodium: 138 mmol/L (Using max of 137 mmol/L) at 6/10/2024  9:47 AM  Total Bilirubin: 0.7 mg/dL (Using min of 1 mg/dL) at 6/10/2024  9:47 AM  Serum Albumin: 4.0 g/dL (Using max of 3.5 g/dL) at 6/10/2024  9:47 AM  INR(ratio): 1.1 at 6/10/2024  9:47 AM  Age at listin years  Sex: Male at 6/10/2024  9:47 AM

## 2024-06-15 RX ORDER — TACROLIMUS 1 MG/1
CAPSULE ORAL
Qty: 270 CAPSULE | Refills: 3 | Status: SHIPPED | OUTPATIENT
Start: 2024-06-15 | End: 2025-06-16

## 2024-06-17 ENCOUNTER — TELEPHONE (OUTPATIENT)
Dept: PSYCHIATRY | Facility: CLINIC | Age: 69
End: 2024-06-17
Payer: MEDICARE

## 2024-06-18 ENCOUNTER — TELEPHONE (OUTPATIENT)
Dept: PSYCHIATRY | Facility: CLINIC | Age: 69
End: 2024-06-18
Payer: MEDICARE

## 2024-06-20 ENCOUNTER — TELEPHONE (OUTPATIENT)
Dept: TRANSPLANT | Facility: CLINIC | Age: 69
End: 2024-06-20
Payer: MEDICARE

## 2024-06-20 NOTE — TELEPHONE ENCOUNTER
Patient has missed multiple apts given to him for liver ultrasound, patient has continued to no show for apts given. Letter sent out.

## 2024-06-20 NOTE — LETTER
June 20, 2024    Chapo Jacobo  [address] [facility] [address]          Dear Chaporin FaganOdalis:  MRN: [id number]    Your Liver transplant ultrasound was due to be drawn on 6/10/24.  We have rescheduled it for you but still no show.   Please call us at [phone] as soon as possible to let us know when you plan to have this ultrasound done, thanks!    Sincerely,    Shanice VILLATORON, RN      Your Liver Transplant Coordinator    [facility]  [address] [place] [id number] [phone]

## 2024-06-21 ENCOUNTER — TELEPHONE (OUTPATIENT)
Dept: PSYCHIATRY | Facility: CLINIC | Age: 69
End: 2024-06-21
Payer: MEDICARE

## 2024-06-21 ENCOUNTER — TELEPHONE (OUTPATIENT)
Dept: TRANSPLANT | Facility: CLINIC | Age: 69
End: 2024-06-21
Payer: MEDICARE

## 2024-07-08 ENCOUNTER — HOSPITAL ENCOUNTER (OUTPATIENT)
Dept: RADIOLOGY | Facility: HOSPITAL | Age: 69
Discharge: HOME | End: 2024-07-08
Attending: INTERNAL MEDICINE
Payer: MEDICARE

## 2024-07-08 ENCOUNTER — OFFICE VISIT (OUTPATIENT)
Dept: PODIATRY | Facility: CLINIC | Age: 69
End: 2024-07-08
Payer: MEDICARE

## 2024-07-08 DIAGNOSIS — D84.9 IMMUNOSUPPRESSION: ICD-10-CM

## 2024-07-08 DIAGNOSIS — E11.49 TYPE 2 DIABETES MELLITUS WITH OTHER NEUROLOGIC COMPLICATION, WITHOUT LONG-TERM CURRENT USE OF INSULIN: Primary | ICD-10-CM

## 2024-07-08 DIAGNOSIS — Z94.4 LIVER REPLACED BY TRANSPLANT: ICD-10-CM

## 2024-07-08 DIAGNOSIS — L60.3 ONYCHODYSTROPHY: ICD-10-CM

## 2024-07-08 DIAGNOSIS — Z94.4 LIVER TRANSPLANTED: ICD-10-CM

## 2024-07-08 DIAGNOSIS — E11.65 UNCONTROLLED TYPE 2 DIABETES MELLITUS WITH HYPERGLYCEMIA: ICD-10-CM

## 2024-07-08 PROCEDURE — 99499 UNLISTED E&M SERVICE: CPT | Mod: S$GLB,,, | Performed by: PODIATRIST

## 2024-07-08 PROCEDURE — 11721 DEBRIDE NAIL 6 OR MORE: CPT | Mod: Q9,S$GLB,, | Performed by: PODIATRIST

## 2024-07-08 PROCEDURE — 93976 VASCULAR STUDY: CPT | Mod: TC

## 2024-07-08 PROCEDURE — 76705 ECHO EXAM OF ABDOMEN: CPT | Mod: TC

## 2024-07-08 PROCEDURE — 99999 PR PBB SHADOW E&M-EST. PATIENT-LVL III: CPT | Mod: PBBFAC,,, | Performed by: PODIATRIST

## 2024-07-08 NOTE — PROGRESS NOTES
Subjective:       Patient ID: Chapo Jacobo is a 68 y.o. male.    Chief Complaint: Routine Foot Care (Patient is a diabetic and was last seen on 6.3.24 by Gaetano East. He denies pain at present.)    HPI: Patient presents to the office today with the chief complaint of elongated, thickened and dystrophic nail plates to the B/L foot.  This patient is a Diabetic Type II, complicated with Peripheral Neuropathy and history of liver transplant on long-term immunosuppressive medication.. Patient does follow with Primary Care and/or Endocrinology for management of Diabetes Mellitus. This patient's PMD is Sj Escudero MD. This patient last saw his/her primary care provider on 06/03/2024 with Dr. Gaetano East    Hemoglobin A1C   Date Value Ref Range Status   01/18/2024 10.5 (H) 4.0 - 5.6 % Final     Comment:     ADA Screening Guidelines:  5.7-6.4%  Consistent with prediabetes  >or=6.5%  Consistent with diabetes    High levels of fetal hemoglobin interfere with the HbA1C  assay. Heterozygous hemoglobin variants (HbS, HgC, etc)do  not significantly interfere with this assay.   However, presence of multiple variants may affect accuracy.     11/01/2016 5.7 4.5 - 6.2 % Final     Comment:     According to ADA guidelines, hemoglobin A1C <7.0% represents  optimal control in non-pregnant diabetic patients.  Different  metrics may apply to specific populations.   Standards of Medical Care in Diabetes - 2016.  For the purpose of screening for the presence of diabetes:  <5.7%     Consistent with the absence of diabetes  5.7-6.4%  Consistent with increasing risk for diabetes   (prediabetes)  >or=6.5%  Consistent with diabetes  Currently no consensus exists for use of hemoglobin A1C  for diagnosis of diabetes for children.     02/16/2006 6.3 (H) 4.5 - 6.2 % Final   .     Review of patient's allergies indicates:   Allergen Reactions    Darvocet a500 [propoxyphene n-acetaminophen]     Paroxetine hcl Other (See Comments)      Other reaction(s): Unknown  Other reaction(s): Unknown  paranoid    Penicillins Other (See Comments)     Other reaction(s): Anaphylaxis  Other reaction(s): Anaphylaxis  As child    Codeine Itching     Other reaction(s): Unknown  Other reaction(s): Unknown       Past Medical History:   Diagnosis Date    Anxiety     Appendicitis     Coronary artery disease of native artery of native heart with stable angina pectoris 01/19/2024    Depression     Diabetes mellitus     Diastolic dysfunction 01/19/2024    Encounter for blood transfusion     Gallbladder & bile duct stone with obstruction     Herpes     PAD (peripheral artery disease) 07/07/2023    Sleep apnea        Family History   Problem Relation Name Age of Onset    Arthritis Mother      Melanoma Father      Cancer Father          melanoma    Fibromyalgia Sister      Cancer Maternal Grandfather          unknown type       Social History     Socioeconomic History    Marital status: Single   Tobacco Use    Smoking status: Every Day     Current packs/day: 1.00     Average packs/day: 1 pack/day for 46.0 years (46.0 ttl pk-yrs)     Types: Cigarettes    Smokeless tobacco: Never   Substance and Sexual Activity    Alcohol use: Yes     Alcohol/week: 2.0 standard drinks of alcohol     Types: 2 Cans of beer per week    Drug use: No    Sexual activity: Yes     Partners: Female       Past Surgical History:   Procedure Laterality Date    ABDOMINAL SURGERY      APPENDECTOMY      bilateral rotator cuff surgery      BRAIN SURGERY      CERVICAL FUSION      CHOLECYSTECTOMY      COLONOSCOPY N/A 7/10/2017    Procedure: COLONOSCOPY;  Surgeon: Viviana Reyes MD;  Location: [facility];  Service: Endoscopy;  Laterality: N/A;    LIVER TRANSPLANT      LUMBAR DISC SURGERY      SELECTIVE INJECTION OF ANESTHETIC AGENT AROUND LUMBAR SPINAL NERVE ROOT BY TRANSFORAMINAL APPROACH Bilateral 9/18/2023    Procedure: Bilateral L4/5 TF JULIUS;  Surgeon: Bill Read MD;  Location: [facility];   Service: Pain Management;  Laterality: Bilateral;    TONSILLECTOMY         Review of Systems       Objective:   There were no vitals taken for this visit.    Physical Exam  LOWER EXTREMITY PHYSICAL EXAMINATION    VASCULAR:  The right dorsalis pedis pulse 2/4 and the right posterior tibial pulse 2/4.  The left dorsalis pedis pulse 2/4 and posterior tibial pulse on the left is 2/4.  Capillary refill is intact.  Pedal hair growth intact    NEUROLOGY: Protective sensation is not intact to the left and right plantar surfaces of the foot and digits, as the patient has no sensation/detection at greater than 4 distinct points of contact with 5.07 [place] Chucho monofilament. Sensation to light touch is intact on the left and right foot. Proprioception is intact, bilateral. Sensation to pin prick is reduced to absent. Vibratory sensation is diminished.    DERMATOLOGY:  Skin is supple, moist, intact.  There is no signs of callusing, ulcerations, other lesions identified to the dorsal or plantar aspect of the right or left foot.  The R1, 2, 5 and left L1,2, 5 are thickened, discolored dystrophic.  There is subungual debris.  Nail plates have area of dark discoloration.  The remaining nails 3-4 on the right foot and the left foot are elongated but of normal color, thickness, and texture.   There is no signs of ingrowing into the medial or lateral borders.  There is no evidence of wounds or skin breakdown.  No edema or erythema.  No obvious lacerations or fissuring.  Interdigital spaces are clean, dry, intact.  No rashes or scars appreciated.    ORTHOPEDIC: Manual Muscle Testing is 5/5 in all planes on the left and right, without pains, with and without resistance. Gait pattern is non-antalgic.    Assessment:     1. Type 2 diabetes mellitus with other neurologic complication, without long-term current use of insulin    2. Uncontrolled type 2 diabetes mellitus with hyperglycemia    3. Immunosuppression    4. Liver replaced by  transplant    5. Onychodystrophy        Plan:     Type 2 diabetes mellitus with other neurologic complication, without long-term current use of insulin    Uncontrolled type 2 diabetes mellitus with hyperglycemia    Immunosuppression    Liver replaced by transplant    Onychodystrophy        Thorough discussion is had with the patient this afternoon, concerning the diagnosis, its etiology, and the treatment algorithm at present.  Greater than 50% of this visit spent on counseling and coordination of care. Greater than 15 minutes of a 20 minute appointment spent on education about the diabetic foot, neuropathy, and prevention of limb loss.  Shoe inspection. Diabetic Foot Education. Patient reminded of the importance of good nutrition and blood sugar control to help prevent podiatric complications of diabetes. Patient instructed on proper foot hygeine. We discussed wearing proper and supportive shoe gear, daily foot inspections, never walking barefooted or sock footed, never putting sharp instruments to feet which can cause major complications associated with infection, ulcers, lacerations.      Dystrophic nail plates, as outlined above (R#1,2,5  ; L#1,2,5 ), are sharply debrided with double action nail nipper, and/or with the assistance of a mechanical rotary jyoti, with removal of all offending nail and nail border(s), for reduction of pains. Nails are reduced in terms of length, width and girth with removal of subungual debris to facilitate pain free weight bearing and ambulation. The elongated nails as outlined in the objective portion of this note, were trimmed to appropriate length, with a double action nail nipper, for alleviation/reduction of pains as well. Follow up in approx. 3-4 months.    Continue management for long-term immunosuppressive therapy per transplant team.    Defer diabetic pain medication to transplant team      Future Appointments   Date Time Provider Department Center   9/18/2024  4:40 PM  Scott Moncada MD [facility]   11/18/2024  1:15 PM Whit Redd DPM [facility]

## 2024-07-12 ENCOUNTER — TELEPHONE (OUTPATIENT)
Dept: TRANSPLANT | Facility: CLINIC | Age: 69
End: 2024-07-12
Payer: MEDICARE

## 2024-07-12 NOTE — TELEPHONE ENCOUNTER
Stable lab letter sent, repeat labs 10/7/24----- Message from Viviana Plascencia MD sent at 7/12/2024  6:01 AM CDT -----  Reviewed, nothing to do; repeat per routine

## 2024-07-12 NOTE — LETTER
July 12, 2024    Chapo Odalis  [address] [facility] [address]          Dear Chapo Jacobo:  MRN: [id number]    This is a follow up to your recent labs, your lab results  and your liver ultrasound were stable.  There are no medicine changes.  Please have your labs drawn again on 10/7/24.      If you cannot have your labs drawn on the scheduled date, it is your responsibility to call the transplant department to reschedule.  Please call [phone] and ask to speak to Racehal FLOWER -  for all scheduling requests.     Sincerely,        Your Liver Transplant Coordinator    [facility]  [address] [place] [id number] [phone]

## 2024-07-12 NOTE — LETTER
July 12, 2024    Chapo Jacobo  [address] [facility] [address]          Dear Chaporin Jacobo:  MRN: [id number]    This is a follow up to your recent labs, your lab results were stable.  There are no medicine changes.  Please have your labs drawn again on 10/7/24.      If you cannot have your labs drawn on the scheduled date, it is your responsibility to call the transplant department to reschedule.  Please call [phone] and ask to speak to Racheal FLOWER -  for all scheduling requests.     Sincerely,        Your Liver Transplant Coordinator    [facility]  [address] [place] [id number] [phone]

## 2024-07-12 NOTE — TELEPHONE ENCOUNTER
----- Message from Viviana Plascencia MD sent at 7/8/2024  6:25 PM CDT -----  Reviewed, nothing to do; repeat per routine

## 2024-09-06 DIAGNOSIS — I10 ESSENTIAL HYPERTENSION: ICD-10-CM

## 2024-09-06 DIAGNOSIS — R94.31 ABNORMAL ECG: Primary | ICD-10-CM

## 2024-09-18 ENCOUNTER — TELEPHONE (OUTPATIENT)
Dept: CARDIOLOGY | Facility: CLINIC | Age: 69
End: 2024-09-18
Payer: MEDICARE

## 2024-09-18 NOTE — TELEPHONE ENCOUNTER
Contacted  PT and scheduled apt, PT stated verbal understanding        ----- Message from Roxana Lake sent at 9/18/2024  1:21 PM CDT -----  .Type:  Sooner Apoointment Request    Caller is requesting a sooner appointment.  Caller declined first available appointment listed below.  Caller will not accept being placed on the waitlist and is requesting a message be sent to doctor.  Name of Caller:.Chapo Jacobo   When is the first available appointment?01/22/2024  Symptoms:4 m/CONF  Would the patient rather a call back or a response via [facility]? Call back  Best Call Back Number:.[phone]   Additional Information: He would like a sooner appointment and afternoon

## 2024-09-24 NOTE — PROGRESS NOTES
Subjective:   Patient ID:  Chapo Jacobo is a 68 y.o. male who presents for evaluation of No chief complaint on file.      HPI    Chapo Jacobo is a 68 year old male who presents to clinic for follow up.   His current medical conditions include CAD, aortic aneurysm, DD, IRIZARRY, PAD, s/p liver transplant, LETICIA, anxiety, DM Type II. He returns today and states he is doing ok.     He continues to endorse issues with fatigue.   Reports compliance with medications.     Denies any regular physical exercise.   Plan to start exercising soon.     Denies any recent NTG use.   Still smoking 1 ppd.     Denies chest pain or anginal equivalents. No shortness of breath, IRIZARRY or palpitations. Denies orthopnea, PND or abdominal bloating. Reports regular walking without any issues lately. NO leg swelling or claudications. No recent falls, syncope or near syncopal events. Reports compliance with medications and dietary restrictions. NO CNS complaints to suggest TIA or CVA today. No signs of abnormal bleeding on ASA  daily.       Past Medical History:   Diagnosis Date    Anxiety     Appendicitis     Coronary artery disease of native artery of native heart with stable angina pectoris 01/19/2024    Depression     Diabetes mellitus     Diastolic dysfunction 01/19/2024    Encounter for blood transfusion     Gallbladder & bile duct stone with obstruction     Herpes     PAD (peripheral artery disease) 07/07/2023    Sleep apnea        Past Surgical History:   Procedure Laterality Date    ABDOMINAL SURGERY      APPENDECTOMY      bilateral rotator cuff surgery      BRAIN SURGERY      CERVICAL FUSION      CHOLECYSTECTOMY      COLONOSCOPY N/A 7/10/2017    Procedure: COLONOSCOPY;  Surgeon: Viviana Reyes MD;  Location: [facility];  Service: Endoscopy;  Laterality: N/A;    LIVER TRANSPLANT      LUMBAR DISC SURGERY      SELECTIVE INJECTION OF ANESTHETIC AGENT AROUND LUMBAR SPINAL NERVE ROOT BY TRANSFORAMINAL APPROACH Bilateral 9/18/2023     Procedure: Bilateral L4/5 TF JULIUS;  Surgeon: Bill Read MD;  Location: [facility];  Service: Pain Management;  Laterality: Bilateral;    TONSILLECTOMY         Social History     Tobacco Use    Smoking status: Every Day     Current packs/day: 1.00     Average packs/day: 1 pack/day for 46.0 years (46.0 ttl pk-yrs)     Types: Cigarettes    Smokeless tobacco: Never   Substance Use Topics    Alcohol use: Yes     Alcohol/week: 2.0 standard drinks of alcohol     Types: 2 Cans of beer per week    Drug use: No       Family History   Problem Relation Name Age of Onset    Arthritis Mother      Melanoma Father      Cancer Father          melanoma    Fibromyalgia Sister      Cancer Maternal Grandfather          unknown type       Wt Readings from Last 3 Encounters:   09/26/24 103 kg (227 lb 1.2 oz)   06/06/24 103.1 kg (227 lb 4.7 oz)   01/19/24 101.4 kg (223 lb 8.7 oz)     Temp Readings from Last 3 Encounters:   06/06/24 97.8 °F (36.6 °C) (Tympanic)   09/18/23 97.8 °F (36.6 °C) (Temporal)   09/28/22 97.9 °F (36.6 °C) (Oral)     BP Readings from Last 3 Encounters:   09/26/24 126/74   06/06/24 (!) 156/67   01/19/24 118/80     Pulse Readings from Last 3 Encounters:   09/26/24 84   06/06/24 85   01/19/24 91       Current Outpatient Medications on File Prior to Visit   Medication Sig Dispense Refill    alprazolam (XANAX) 1 MG tablet Take 1 mg by mouth 2 (two) times daily. Patient takes 1 tablet twice daily  1    aspirin (ECOTRIN) 81 MG EC tablet Take 1 tablet (81 mg total) by mouth once daily. 30 tablet 12    FLUoxetine 20 MG capsule Take 4 capsules by mouth Daily.       gabapentin (NEURONTIN) 100 MG capsule Take 100 mg by mouth 3 (three) times daily.      glimepiride (AMARYL) 4 MG tablet Take 1 tablet by mouth as needed.       hydrocortisone 2.5 % cream   0    JARDIANCE 25 mg tablet Take 25 mg by mouth.      levocetirizine (XYZAL) 5 MG tablet Take 5 mg by mouth every evening.      losartan (COZAAR) 50 MG tablet Take 50 mg  by mouth once daily.      metformin (GLUCOPHAGE) 1000 MG tablet Take 1 tablet by mouth Daily.      nitroGLYCERIN (NITROSTAT) 0.4 MG SL tablet Place 1 tablet (0.4 mg total) under the tongue every 5 (five) minutes as needed for Chest pain. 30 tablet 12    omega-3 acid ethyl esters (LOVAZA) 1 gram capsule Take 1 capsule by mouth 3 (three) times daily.      pioglitazone (ACTOS) 15 MG tablet Take 15 mg by mouth.      rosuvastatin (CRESTOR) 5 MG tablet Take 5 mg by mouth.      tacrolimus (PROGRAF) 1 MG Cap Take 2 capsules (2 mg total) by mouth every morning AND 1 capsule (1 mg total) every evening. 270 capsule 3    tamsulosin (FLOMAX) 0.4 mg Cap Take 1 capsule (0.4 mg total) by mouth once daily. 30 capsule 0    triamcinolone acetonide 0.1% (KENALOG) 0.1 % Lotn       valacyclovir (VALTREX) 500 MG tablet Take 500 mg by mouth once daily.       amitriptyline (ELAVIL) 25 MG tablet Take 1 tablet (25 mg total) by mouth every evening. (Patient not taking: Reported on 9/26/2024) 30 tablet 11    cyclobenzaprine (FLEXERIL) 10 MG tablet Take 0.5 tablets (5 mg total) by mouth 3 (three) times daily as needed for Muscle spasms. (Patient not taking: Reported on 9/26/2024) 15 tablet 0    dextroamphetamine-amphetamine 30 mg Tab Take 1 tablet by mouth as needed. (Patient not taking: Reported on 9/26/2024)      isosorbide mononitrate (IMDUR) 30 MG 24 hr tablet Take 1 tablet (30 mg total) by mouth once daily. (Patient not taking: Reported on 9/26/2024) 90 tablet 3    mupirocin (BACTROBAN) 2 % ointment APPLY TO SORE TWICE DAILY AS NEEDED (Patient not taking: Reported on 9/26/2024)       No current facility-administered medications on file prior to visit.       No cardiac monitor results found for the past 12 months    Results for orders placed during the hospital encounter of 08/18/23    Echo    Interpretation Summary    Left Ventricle: The left ventricle is normal in size. Normal wall thickness. Normal wall motion. There is normal systolic  function with a visually estimated ejection fraction of 55 - 70%. Grade II diastolic dysfunction.    Right Ventricle: Normal right ventricular cavity size. Wall thickness is normal. Right ventricle wall motion  is normal. Systolic function is normal.    Mitral Valve: There is no stenosis.    Tricuspid Valve: There is mild regurgitation.    IVC/SVC: Intermediate venous pressure at 8 mmHg.    Results for orders placed during the hospital encounter of 08/18/23    Nuclear Stress - Cardiology Interpreted    Interpretation Summary    Normal myocardial perfusion scan. There is no evidence of myocardial ischemia or infarction.    There is a  moderate intensity fixed perfusion abnormality in the apical and inferoapical wall of the left ventricle secondary to diaphragm attenuation.    The gated perfusion images showed an ejection fraction of 57% at rest. The gated perfusion images showed an ejection fraction of 66% post stress.    The ECG portion of the study is negative for ischemia.    The patient reported no chest pain during the stress test.    During stress, occasional PACs are noted.        Results for orders placed or performed during the hospital encounter of 09/26/24   SCHEDULED EKG 12-LEAD (to Muse)    Collection Time: 09/26/24 12:49 PM   Result Value Ref Range    QRS Duration 120 ms    [facility] QTC Calculation 462 ms    Narrative    Test Reason : R94.31,I10,    Vent. Rate : 090 BPM     Atrial Rate : 090 BPM     P-R Int : 170 ms          QRS Dur : 120 ms      QT Int : 378 ms       P-R-T Axes : 072 074 039 degrees     QTc Int : 462 ms    Normal sinus rhythm  Low voltage QRS  Right bundle branch block  Abnormal ECG  When compared with ECG of 18-AUG-2023 12:31,  No significant change was found    Referred By: BISHOP HANCOCK           Confirmed By:          Review of Systems   Constitutional: Positive for malaise/fatigue.   HENT:  Negative for hearing loss and hoarse voice.    Eyes:  Negative for blurred vision and visual  "disturbance.   Cardiovascular:  Negative for chest pain, claudication, dyspnea on exertion, irregular heartbeat, leg swelling, near-syncope, orthopnea, palpitations, paroxysmal nocturnal dyspnea and syncope.   Respiratory:  Negative for cough, hemoptysis, shortness of breath, sleep disturbances due to breathing, snoring and wheezing.    Endocrine: Negative for cold intolerance and heat intolerance.   Hematologic/Lymphatic: Does not bruise/bleed easily.   Skin:  Negative for color change, dry skin and nail changes.   Musculoskeletal:  Positive for arthritis, back pain and joint pain. Negative for myalgias.   Gastrointestinal:  Negative for bloating, abdominal pain, constipation, nausea and vomiting.   Genitourinary:  Negative for dysuria, flank pain, hematuria and hesitancy.   Neurological:  Negative for headaches, light-headedness, loss of balance, numbness, paresthesias and weakness.   Psychiatric/Behavioral:  Negative for altered mental status.    Allergic/Immunologic: Negative for environmental allergies.         Objective:/74 (BP Location: Left arm, Patient Position: Sitting, BP Method: Large (Manual))   Pulse 84   Ht 5' 10" (1.778 m)   Wt 103 kg (227 lb 1.2 oz)   SpO2 95%   BMI 32.58 kg/m²      Physical Exam  Vitals and nursing note reviewed.   Constitutional:       General: He is not in acute distress.     Appearance: Normal appearance. He is well-developed. He is obese. He is not ill-appearing.   HENT:      Head: Normocephalic and atraumatic.      Nose: Nose normal.      Mouth/Throat:      Mouth: Mucous membranes are moist.   Eyes:      Pupils: Pupils are equal, round, and reactive to light.   Neck:      Thyroid: No thyromegaly.      Vascular: No JVD.      Trachea: No tracheal deviation.   Cardiovascular:      Rate and Rhythm: Normal rate and regular rhythm.      Chest Wall: PMI is not displaced.      Pulses: Intact distal pulses.           Radial pulses are 2+ on the right side and 2+ on the left " side.        Dorsalis pedis pulses are 2+ on the right side and 2+ on the left side.      Heart sounds: S1 normal and S2 normal. Heart sounds not distant. No murmur heard.  Pulmonary:      Effort: Pulmonary effort is normal. No respiratory distress.      Breath sounds: Normal breath sounds. No wheezing.   Abdominal:      General: Bowel sounds are normal. There is no distension.      Palpations: Abdomen is soft.      Tenderness: There is no abdominal tenderness.   Musculoskeletal:         General: No swelling. Normal range of motion.      Cervical back: Full passive range of motion without pain, normal range of motion and neck supple.      Right lower leg: No edema.      Left lower leg: No edema.      Right ankle: No swelling.      Left ankle: No swelling.   Skin:     General: Skin is warm and dry.      Capillary Refill: Capillary refill takes less than 2 seconds.      Nails: There is no clubbing.   Neurological:      General: No focal deficit present.      Mental Status: He is alert and oriented to person, place, and time.      Motor: No weakness.   Psychiatric:         Speech: Speech normal.         Behavior: Behavior normal.         Thought Content: Thought content normal.         Judgment: Judgment normal.         Lab Results   Component Value Date    CHOL 153 01/18/2024    CHOL 188 11/01/2016    CHOL 118 (L) 12/27/2007     Lab Results   Component Value Date    HDL 33 (L) 01/18/2024    HDL 29 (L) 11/01/2016    HDL 24 (L) 12/27/2007     Lab Results   Component Value Date    LDLCALC 57.2 (L) 01/18/2024    LDLCALC 109.8 11/01/2016    LDLCALC 57.8 (L) 12/27/2007     Lab Results   Component Value Date    TRIG 314 (H) 01/18/2024    TRIG 246 (H) 11/01/2016    TRIG 181 (H) 12/27/2007     Lab Results   Component Value Date    CHOLHDL 21.6 01/18/2024    CHOLHDL 15.4 (L) 11/01/2016    CHOLHDL 20.3 12/27/2007       Chemistry        Component Value Date/Time     07/08/2024 1031    K 4.1 07/08/2024 1031      07/08/2024 1031    CO2 25 07/08/2024 1031    BUN 14 07/08/2024 1031    CREATININE 1.0 07/08/2024 1031     (H) 07/08/2024 1031        Component Value Date/Time    CALCIUM 9.1 07/08/2024 1031    ALKPHOS 70 07/08/2024 1031    AST 17 07/08/2024 1031    ALT 16 07/08/2024 1031    BILITOT 0.4 07/08/2024 1031    ESTGFRAFRICA >60 04/06/2022 0840    EGFRNONAA >60 04/06/2022 0840          Lab Results   Component Value Date    TSH 2.879 01/18/2024     Lab Results   Component Value Date    INR 1.1 07/08/2024    INR 1.1 06/10/2024    INR 1.1 04/09/2024     Lab Results   Component Value Date    WBC 6.31 07/08/2024    HGB 16.9 07/08/2024    HCT 50.1 07/08/2024    MCV 93 07/08/2024     07/08/2024     BNP  @LABRCNTIP(BNP,BNPTRIAGEBLO)@  CrCl cannot be calculated (Patient's most recent lab result is older than the maximum 7 days allowed.).     Assessment:      1. Essential hypertension    2. IRIZARRY (dyspnea on exertion)    3. Coronary artery disease of native artery of native heart with stable angina pectoris    4. Diastolic dysfunction    5. Aneurysm of ascending aorta without rupture    6. Class 1 obesity due to excess calories with serious comorbidity and body mass index (BMI) of 33.0 to 33.9 in adult    7. Type 2 diabetes mellitus with diabetic peripheral angiopathy without gangrene, without long-term current use of insulin    8. Tobacco abuse        Plan:     Encourage smoking cessation  Continue current medical therapy  Profile BP at home  Dash diet 2 gm sodium restriction  Encourage regular physical activity  RTC with Dr Moncada in January as scheduled    SANDRA Andersen  [facility]

## 2024-09-25 DIAGNOSIS — I20.9 AP (ANGINA PECTORIS): ICD-10-CM

## 2024-09-25 DIAGNOSIS — I10 ESSENTIAL HYPERTENSION: ICD-10-CM

## 2024-09-25 DIAGNOSIS — R94.31 ABNORMAL ECG: Primary | ICD-10-CM

## 2024-09-26 ENCOUNTER — OFFICE VISIT (OUTPATIENT)
Dept: CARDIOLOGY | Facility: CLINIC | Age: 69
End: 2024-09-26
Payer: MEDICARE

## 2024-09-26 ENCOUNTER — HOSPITAL ENCOUNTER (OUTPATIENT)
Dept: CARDIOLOGY | Facility: HOSPITAL | Age: 69
Discharge: HOME | End: 2024-09-26
Attending: INTERNAL MEDICINE
Payer: MEDICARE

## 2024-09-26 VITALS
SYSTOLIC BLOOD PRESSURE: 126 MMHG | DIASTOLIC BLOOD PRESSURE: 74 MMHG | HEIGHT: 70 IN | HEART RATE: 84 BPM | WEIGHT: 227.06 LBS | OXYGEN SATURATION: 95 % | BODY MASS INDEX: 32.51 KG/M2

## 2024-09-26 DIAGNOSIS — R06.09 DOE (DYSPNEA ON EXERTION): ICD-10-CM

## 2024-09-26 DIAGNOSIS — R94.31 ABNORMAL ECG: ICD-10-CM

## 2024-09-26 DIAGNOSIS — I71.21 ANEURYSM OF ASCENDING AORTA WITHOUT RUPTURE: ICD-10-CM

## 2024-09-26 DIAGNOSIS — I51.89 DIASTOLIC DYSFUNCTION: ICD-10-CM

## 2024-09-26 DIAGNOSIS — I10 ESSENTIAL HYPERTENSION: ICD-10-CM

## 2024-09-26 DIAGNOSIS — Z72.0 TOBACCO ABUSE: ICD-10-CM

## 2024-09-26 DIAGNOSIS — E66.09 CLASS 1 OBESITY DUE TO EXCESS CALORIES WITH SERIOUS COMORBIDITY AND BODY MASS INDEX (BMI) OF 33.0 TO 33.9 IN ADULT: ICD-10-CM

## 2024-09-26 DIAGNOSIS — I25.118 CORONARY ARTERY DISEASE OF NATIVE ARTERY OF NATIVE HEART WITH STABLE ANGINA PECTORIS: ICD-10-CM

## 2024-09-26 DIAGNOSIS — I10 ESSENTIAL HYPERTENSION: Primary | ICD-10-CM

## 2024-09-26 DIAGNOSIS — E11.51 TYPE 2 DIABETES MELLITUS WITH DIABETIC PERIPHERAL ANGIOPATHY WITHOUT GANGRENE, WITHOUT LONG-TERM CURRENT USE OF INSULIN: ICD-10-CM

## 2024-09-26 PROCEDURE — 99999 PR PBB SHADOW E&M-EST. PATIENT-LVL IV: CPT | Mod: PBBFAC,,, | Performed by: NURSE PRACTITIONER

## 2024-09-26 PROCEDURE — 93010 ELECTROCARDIOGRAM REPORT: CPT | Mod: ,,, | Performed by: INTERNAL MEDICINE

## 2024-09-26 PROCEDURE — 93005 ELECTROCARDIOGRAM TRACING: CPT

## 2024-09-27 LAB
OHS QRS DURATION: 120 MS
OHS QTC CALCULATION: 462 MS

## 2024-10-09 ENCOUNTER — LAB VISIT (OUTPATIENT)
Dept: LAB | Facility: HOSPITAL | Age: 69
End: 2024-10-09
Attending: INTERNAL MEDICINE
Payer: MEDICARE

## 2024-10-09 DIAGNOSIS — Z94.4 LIVER TRANSPLANTED: ICD-10-CM

## 2024-10-09 LAB
ALBUMIN SERPL BCP-MCNC: 3.9 G/DL (ref 3.5–5.2)
ALP SERPL-CCNC: 74 U/L (ref 55–135)
ALT SERPL W/O P-5'-P-CCNC: 15 U/L (ref 10–44)
ANION GAP SERPL CALC-SCNC: 11 MMOL/L (ref 8–16)
AST SERPL-CCNC: 18 U/L (ref 10–40)
BASOPHILS # BLD AUTO: 0.11 K/UL (ref 0–0.2)
BASOPHILS NFR BLD: 1.5 % (ref 0–1.9)
BILIRUB SERPL-MCNC: 0.5 MG/DL (ref 0.1–1)
BUN SERPL-MCNC: 17 MG/DL (ref 8–23)
CALCIUM SERPL-MCNC: 9.1 MG/DL (ref 8.7–10.5)
CHLORIDE SERPL-SCNC: 101 MMOL/L (ref 95–110)
CO2 SERPL-SCNC: 25 MMOL/L (ref 23–29)
CREAT SERPL-MCNC: 1.1 MG/DL (ref 0.5–1.4)
DIFFERENTIAL METHOD BLD: NORMAL
EOSINOPHIL # BLD AUTO: 0.2 K/UL (ref 0–0.5)
EOSINOPHIL NFR BLD: 2.5 % (ref 0–8)
ERYTHROCYTE [DISTWIDTH] IN BLOOD BY AUTOMATED COUNT: 13.3 % (ref 11.5–14.5)
EST. GFR  (NO RACE VARIABLE): >60 ML/MIN/1.73 M^2
GLUCOSE SERPL-MCNC: 176 MG/DL (ref 70–110)
HCT VFR BLD AUTO: 51.7 % (ref 40–54)
HGB BLD-MCNC: 17.6 G/DL (ref 14–18)
IMM GRANULOCYTES # BLD AUTO: 0.03 K/UL (ref 0–0.04)
IMM GRANULOCYTES NFR BLD AUTO: 0.4 % (ref 0–0.5)
INR PPP: 1 (ref 0.8–1.2)
LYMPHOCYTES # BLD AUTO: 1.9 K/UL (ref 1–4.8)
LYMPHOCYTES NFR BLD: 25.8 % (ref 18–48)
MCH RBC QN AUTO: 31 PG (ref 27–31)
MCHC RBC AUTO-ENTMCNC: 34 G/DL (ref 32–36)
MCV RBC AUTO: 91 FL (ref 82–98)
MONOCYTES # BLD AUTO: 0.6 K/UL (ref 0.3–1)
MONOCYTES NFR BLD: 8.3 % (ref 4–15)
NEUTROPHILS # BLD AUTO: 4.4 K/UL (ref 1.8–7.7)
NEUTROPHILS NFR BLD: 61.5 % (ref 38–73)
NRBC BLD-RTO: 0 /100 WBC
PLATELET # BLD AUTO: 204 K/UL (ref 150–450)
PMV BLD AUTO: 11.9 FL (ref 9.2–12.9)
POTASSIUM SERPL-SCNC: 3.9 MMOL/L (ref 3.5–5.1)
PROT SERPL-MCNC: 7.5 G/DL (ref 6–8.4)
PROTHROMBIN TIME: 11.4 SEC (ref 9–12.5)
RBC # BLD AUTO: 5.67 M/UL (ref 4.6–6.2)
SODIUM SERPL-SCNC: 137 MMOL/L (ref 136–145)
WBC # BLD AUTO: 7.2 K/UL (ref 3.9–12.7)

## 2024-10-09 PROCEDURE — 80053 COMPREHEN METABOLIC PANEL: CPT | Performed by: INTERNAL MEDICINE

## 2024-10-09 PROCEDURE — 36415 COLL VENOUS BLD VENIPUNCTURE: CPT | Mod: PO | Performed by: INTERNAL MEDICINE

## 2024-10-09 PROCEDURE — 80197 ASSAY OF TACROLIMUS: CPT | Performed by: INTERNAL MEDICINE

## 2024-10-09 PROCEDURE — 85610 PROTHROMBIN TIME: CPT | Performed by: INTERNAL MEDICINE

## 2024-10-09 PROCEDURE — 85025 COMPLETE CBC W/AUTO DIFF WBC: CPT | Performed by: INTERNAL MEDICINE

## 2024-10-10 LAB — TACROLIMUS BLD-MCNC: 6.3 NG/ML (ref 5–15)

## 2024-10-14 ENCOUNTER — TELEPHONE (OUTPATIENT)
Dept: TRANSPLANT | Facility: CLINIC | Age: 69
End: 2024-10-14
Payer: MEDICARE

## 2024-10-14 DIAGNOSIS — Z94.4 LIVER TRANSPLANTED: ICD-10-CM

## 2024-10-14 DIAGNOSIS — D84.9 IMMUNOSUPPRESSION: Primary | ICD-10-CM

## 2024-10-14 DIAGNOSIS — K74.60 HEPATIC CIRRHOSIS, UNSPECIFIED HEPATIC CIRRHOSIS TYPE, UNSPECIFIED WHETHER ASCITES PRESENT: ICD-10-CM

## 2024-10-14 NOTE — TELEPHONE ENCOUNTER
Letter sent to patient stating: Your labs have been reviewed by your Transplant physician, no action required. Next labs and ultrasound due January 2025 along with a visit to establish with DR Hope.      ----- Message from Viviana Plascencia MD sent at 10/13/2024  9:33 PM CDT -----  Reviewed, nothing to do; repeat per routine

## 2024-11-11 ENCOUNTER — TELEPHONE (OUTPATIENT)
Dept: TRANSPLANT | Facility: CLINIC | Age: 69
End: 2024-11-11
Payer: MEDICARE

## 2024-12-04 ENCOUNTER — OFFICE VISIT (OUTPATIENT)
Dept: PODIATRY | Facility: CLINIC | Age: 69
End: 2024-12-04
Payer: MEDICARE

## 2024-12-04 DIAGNOSIS — Z94.4 LIVER REPLACED BY TRANSPLANT: ICD-10-CM

## 2024-12-04 DIAGNOSIS — E11.49 TYPE 2 DIABETES MELLITUS WITH OTHER NEUROLOGIC COMPLICATION, WITHOUT LONG-TERM CURRENT USE OF INSULIN: ICD-10-CM

## 2024-12-04 DIAGNOSIS — E11.65 UNCONTROLLED TYPE 2 DIABETES MELLITUS WITH HYPERGLYCEMIA: Primary | ICD-10-CM

## 2024-12-04 DIAGNOSIS — D84.9 IMMUNOSUPPRESSION: ICD-10-CM

## 2024-12-04 DIAGNOSIS — L60.3 ONYCHODYSTROPHY: ICD-10-CM

## 2024-12-04 PROCEDURE — 99999 PR PBB SHADOW E&M-EST. PATIENT-LVL III: CPT | Mod: PBBFAC,,, | Performed by: PODIATRIST

## 2024-12-04 NOTE — PROGRESS NOTES
Subjective:       Patient ID: Chapo Jacobo is a 69 y.o. male.    Chief Complaint: Diabetic Foot Exam (DFE: C/o bart pain, pt is diabetic, pt states feet are just numb and cold.)    HPI: Patient presents to the office today with the chief complaint of elongated, thickened and dystrophic nail plates to the B/L foot.  This patient is a Diabetic Type II, complicated with Peripheral Neuropathy and history of liver transplant on long-term immunosuppressive medication.  Currently following with transplant team and Dr. Hope with upcoming appointment in January of 2025. Patient does follow with Primary Care and/or Endocrinology for management of Diabetes Mellitus. This patient's PMD is Sj Escudero MD. This patient last saw his/her primary care provider on 06/03/2024 with Dr. Gaetano East.    Hemoglobin A1C   Date Value Ref Range Status   01/18/2024 10.5 (H) 4.0 - 5.6 % Final     Comment:     ADA Screening Guidelines:  5.7-6.4%  Consistent with prediabetes  >or=6.5%  Consistent with diabetes    High levels of fetal hemoglobin interfere with the HbA1C  assay. Heterozygous hemoglobin variants (HbS, HgC, etc)do  not significantly interfere with this assay.   However, presence of multiple variants may affect accuracy.     11/01/2016 5.7 4.5 - 6.2 % Final     Comment:     According to ADA guidelines, hemoglobin A1C <7.0% represents  optimal control in non-pregnant diabetic patients.  Different  metrics may apply to specific populations.   Standards of Medical Care in Diabetes - 2016.  For the purpose of screening for the presence of diabetes:  <5.7%     Consistent with the absence of diabetes  5.7-6.4%  Consistent with increasing risk for diabetes   (prediabetes)  >or=6.5%  Consistent with diabetes  Currently no consensus exists for use of hemoglobin A1C  for diagnosis of diabetes for children.     02/16/2006 6.3 (H) 4.5 - 6.2 % Final   .     Review of patient's allergies indicates:   Allergen Reactions     Darvocet a500 [propoxyphene n-acetaminophen]     Paroxetine hcl Other (See Comments)     Other reaction(s): Unknown  Other reaction(s): Unknown  paranoid    Penicillins Other (See Comments)     Other reaction(s): Anaphylaxis  Other reaction(s): Anaphylaxis  As child    Codeine Itching     Other reaction(s): Unknown  Other reaction(s): Unknown       Past Medical History:   Diagnosis Date    Anxiety     Appendicitis     Coronary artery disease of native artery of native heart with stable angina pectoris 01/19/2024    Depression     Diabetes mellitus     Diastolic dysfunction 01/19/2024    Encounter for blood transfusion     Gallbladder & bile duct stone with obstruction     Herpes     PAD (peripheral artery disease) 07/07/2023    Sleep apnea        Family History   Problem Relation Name Age of Onset    Arthritis Mother      Melanoma Father      Cancer Father          melanoma    Fibromyalgia Sister      Cancer Maternal Grandfather          unknown type       Social History     Socioeconomic History    Marital status: Single   Tobacco Use    Smoking status: Every Day     Current packs/day: 1.00     Average packs/day: 1 pack/day for 46.0 years (46.0 ttl pk-yrs)     Types: Cigarettes    Smokeless tobacco: Never   Substance and Sexual Activity    Alcohol use: Yes     Alcohol/week: 2.0 standard drinks of alcohol     Types: 2 Cans of beer per week    Drug use: No    Sexual activity: Yes     Partners: Female       Past Surgical History:   Procedure Laterality Date    ABDOMINAL SURGERY      APPENDECTOMY      bilateral rotator cuff surgery      BRAIN SURGERY      CERVICAL FUSION      CHOLECYSTECTOMY      COLONOSCOPY N/A 7/10/2017    Procedure: COLONOSCOPY;  Surgeon: Viviana Reyes MD;  Location: [facility];  Service: Endoscopy;  Laterality: N/A;    LIVER TRANSPLANT      LUMBAR DISC SURGERY      SELECTIVE INJECTION OF ANESTHETIC AGENT AROUND LUMBAR SPINAL NERVE ROOT BY TRANSFORAMINAL APPROACH Bilateral 9/18/2023    Procedure:  Bilateral L4/5 TF JULIUS;  Surgeon: Bill Read MD;  Location: [facility];  Service: Pain Management;  Laterality: Bilateral;    TONSILLECTOMY         Review of Systems       Objective:   There were no vitals taken for this visit.    Physical Exam  LOWER EXTREMITY PHYSICAL EXAMINATION    VASCULAR:  The right dorsalis pedis pulse 2/4 and the right posterior tibial pulse 2/4.  The left dorsalis pedis pulse 2/4 and posterior tibial pulse on the left is 2/4.  Capillary refill is intact.  Pedal hair growth intact    NEUROLOGY: Protective sensation is not intact to the left and right plantar surfaces of the foot and digits, as the patient has no sensation/detection at greater than 4 distinct points of contact with 5.07 [place] monofilament. Sensation to light touch is intact on the left and right foot. Proprioception is intact, bilateral. Sensation to pin prick is reduced to absent. Vibratory sensation is diminished.    DERMATOLOGY:  Skin is supple, moist, intact.  There is no signs of callusing, ulcerations, other lesions identified to the dorsal or plantar aspect of the right or left foot.  The R1, 2, 5 and left L1,2, 5 are thickened, discolored dystrophic.  There is subungual debris.  Nail plates have area of dark discoloration.  The remaining nails 3-4 on the right foot and the left foot are elongated but of normal color, thickness, and texture.   There is no signs of ingrowing into the medial or lateral borders.  There is no evidence of wounds or skin breakdown.  No edema or erythema.  No obvious lacerations or fissuring.  Interdigital spaces are clean, dry, intact.  No rashes or scars appreciated.    ORTHOPEDIC: Manual Muscle Testing is 5/5 in all planes on the left and right, without pains, with and without resistance. Gait pattern is non-antalgic.    Assessment:     1. Uncontrolled type 2 diabetes mellitus with hyperglycemia    2. Type 2 diabetes mellitus with other neurologic complication, without  long-term current use of insulin    3. Immunosuppression    4. Liver replaced by transplant    5. Onychodystrophy        Plan:     Uncontrolled type 2 diabetes mellitus with hyperglycemia    Type 2 diabetes mellitus with other neurologic complication, without long-term current use of insulin    Immunosuppression    Liver replaced by transplant    Onychodystrophy        Thorough discussion is had with the patient this afternoon, concerning the diagnosis, its etiology, and the treatment algorithm at present.  Greater than 50% of this visit spent on counseling and coordination of care. Greater than 15 minutes of a 20 minute appointment spent on education about the diabetic foot, neuropathy, and prevention of limb loss.  Shoe inspection. Diabetic Foot Education. Patient reminded of the importance of good nutrition and blood sugar control to help prevent podiatric complications of diabetes. Patient instructed on proper foot hygeine. We discussed wearing proper and supportive shoe gear, daily foot inspections, never walking barefooted or sock footed, never putting sharp instruments to feet which can cause major complications associated with infection, ulcers, lacerations.      Dystrophic nail plates, as outlined above (R#1,2,5  ; L#1,2,5 ), are sharply debrided with double action nail nipper, and/or with the assistance of a mechanical rotary jyoti, with removal of all offending nail and nail border(s), for reduction of pains. Nails are reduced in terms of length, width and girth with removal of subungual debris to facilitate pain free weight bearing and ambulation. The elongated nails as outlined in the objective portion of this note, were trimmed to appropriate length, with a double action nail nipper, for alleviation/reduction of pains as well. Follow up in approx. 3-4 months.    Continue management for long-term immunosuppressive therapy per transplant team.    Defer diabetic pain medication to transplant  team      Future Appointments   Date Time Provider Department Center   1/24/2025  3:00 PM Scott Moncada MD ON CARDIO  Medical [facility]

## 2024-12-10 NOTE — LETTER
March 15, 2019    Chapo Jacobo  [address] [address] [facility] [address]          Dear Chapo Jacobo:  MRN: [id number]    This is a follow up to your recent labs, your lab results were stable.  There are no medicine changes.  Please have your labs drawn again on 6/17/19.      If you cannot have your labs drawn on the scheduled date, it is your responsibility to call the transplant department to reschedule.  To reschedule or make an appointment, please as to speak to or leave a message for my assistant, Rosy Huntley or Racheal, at [phone].  When leaving a message for Rosy Huntley Angela or myself, we ask that you leave a brief message regarding your request.    Sincerely,      Racheal Choudhary, RN, BSN, CCTC  Your Liver Transplant Coordinator    [facility] [facility]  [address] [place] [phone]       
Initial (On Arrival)

## 2025-01-08 ENCOUNTER — TELEPHONE (OUTPATIENT)
Dept: HEPATOLOGY | Facility: CLINIC | Age: 70
End: 2025-01-08
Payer: MEDICARE

## 2025-01-08 NOTE — TELEPHONE ENCOUNTER
Dr. Maravilla is currently assigned as the Hepatologist for this patient.  Reached out to Liver coordinator to decide which direction patient needs to go in.

## 2025-01-08 NOTE — TELEPHONE ENCOUNTER
----- Message from Saeana cristinacampbell sent at 1/8/2025  4:31 PM CST -----  Contact: [phone]  Type:  Sooner Apoointment Request    Caller is requesting a sooner appointment.  Caller declined first available appointment listed below.  Caller will not accept being placed on the waitlist and is requesting a message be sent to doctor.  Name of Caller:SEMAJ BURDEN [[id number]]  When is the first available appointment?next availability   Symptoms:NEW PATIENT... new you to look at his ultrasound results / and see how his liver is doing   Would the patient rather a call back or a response via [facility]? CALL BACK  Best Call Back Number: [phone]  Additional Information: MRN    [id number]

## 2025-01-17 ENCOUNTER — HOSPITAL ENCOUNTER (OUTPATIENT)
Dept: RADIOLOGY | Facility: HOSPITAL | Age: 70
Discharge: HOME | End: 2025-01-17
Attending: INTERNAL MEDICINE
Payer: MEDICARE

## 2025-01-17 DIAGNOSIS — Z94.4 LIVER TRANSPLANTED: ICD-10-CM

## 2025-01-17 PROCEDURE — 76705 ECHO EXAM OF ABDOMEN: CPT | Mod: 26,59,, | Performed by: RADIOLOGY

## 2025-01-17 PROCEDURE — 76705 ECHO EXAM OF ABDOMEN: CPT | Mod: TC

## 2025-01-17 PROCEDURE — 93976 VASCULAR STUDY: CPT | Mod: 26,,, | Performed by: RADIOLOGY

## 2025-01-24 ENCOUNTER — TELEPHONE (OUTPATIENT)
Dept: TRANSPLANT | Facility: CLINIC | Age: 70
End: 2025-01-24
Payer: MEDICARE

## 2025-01-24 DIAGNOSIS — Z94.4 LIVER TRANSPLANTED: Primary | ICD-10-CM

## 2025-01-24 NOTE — TELEPHONE ENCOUNTER
Pt advised  ----- Message from Claudia Hope MD sent at 1/20/2025  7:23 PM CST -----  Repeat in 3 months  ----- Message -----  From: Jennifer Patrick RN  Sent: 1/17/2025   4:31 PM CST  To: Claudia Hope MD    New to me from [place] but looks like he has cirrhosis post transplant sp this is the 6 month ultrasound.  ----- Message -----  From: Claudia Hope MD  Sent: 1/17/2025   4:13 PM CST  To: [facility] Post-Liver Transplant Clinical    Any idea why this was done?

## 2025-01-24 NOTE — TELEPHONE ENCOUNTER
Letter sent, labs stable and no medication changes are needed. Repeat labs due 4/21/25 per protocol.  ----- Message from Claudia Hope MD sent at 1/20/2025  8:45 PM CST -----  The Labs are stable - please let patient know.

## 2025-01-24 NOTE — LETTER
January 24, 2025    Chapo Jacobo  [id number] [facility] [address]          Dear Chaporin Jacobo:  MRN: [id number]    This is a follow up to your recent labs, your lab results were stable.  There are no medicine changes.  Please have your labs drawn again on 4/21/25.      If you cannot have your labs drawn on the scheduled date, it is your responsibility to call the transplant department to reschedule.  Please call [phone] and ask to speak to Racheal FLOWER -  for all scheduling requests.     Sincerely,        Your Liver Transplant Coordinator    [facility]  [address] [place] [id number] [phone]

## 2025-01-26 PROBLEM — K76.6 PORTAL HYPERTENSION: Status: ACTIVE | Noted: 2025-01-26

## 2025-01-26 PROBLEM — K74.60 CIRRHOSIS OF LIVER WITHOUT ASCITES: Status: ACTIVE | Noted: 2025-01-26

## 2025-01-26 PROBLEM — Z51.81 THERAPEUTIC DRUG MONITORING: Status: ACTIVE | Noted: 2025-01-26

## 2025-01-27 DIAGNOSIS — R07.89 ATYPICAL CHEST PAIN: ICD-10-CM

## 2025-01-27 RX ORDER — NITROGLYCERIN 0.4 MG/1
0.4 TABLET SUBLINGUAL EVERY 5 MIN PRN
Qty: 30 TABLET | Refills: 12 | Status: SHIPPED | OUTPATIENT
Start: 2025-01-27 | End: 2026-01-27

## 2025-01-27 NOTE — TELEPHONE ENCOUNTER
----- Message from Jacque sent at 1/27/2025  4:43 PM CST -----  Contact: :[phone]  Type:  Sooner Apoointment Request    Caller is requesting a sooner appointment.  Caller declined first available appointment listed below.  Caller will not accept being placed on the waitlist and is requesting a message be sent to doctor.  Name of Caller:SEMAJ BURDEN [[id number]]  When is the first available appointment?reschedule appointments  Symptoms:LVM to confirm//ra//no energy   Would the patient rather a call back or a response via [facility]? Call back  Best Call Back Number:[phone]  Additional Information: [id number]

## 2025-01-28 ENCOUNTER — TELEPHONE (OUTPATIENT)
Dept: GASTROENTEROLOGY | Facility: CLINIC | Age: 70
End: 2025-01-28
Payer: MEDICARE

## 2025-01-28 ENCOUNTER — TELEPHONE (OUTPATIENT)
Dept: TRANSPLANT | Facility: CLINIC | Age: 70
End: 2025-01-28
Payer: MEDICARE

## 2025-01-28 ENCOUNTER — TELEPHONE (OUTPATIENT)
Dept: CARDIOLOGY | Facility: CLINIC | Age: 70
End: 2025-01-28
Payer: MEDICARE

## 2025-01-28 NOTE — TELEPHONE ENCOUNTER
----- Message from Jacque sent at 1/27/2025  4:41 PM CST -----  Contact: [phone]  Type:  Sooner Apoointment Request    Caller is requesting a sooner appointment.  Caller declined first available appointment listed below.  Caller will not accept being placed on the waitlist and is requesting a message be sent to doctor.  Name of Caller:SEMAJ BURDEN [[id number]]  When is the first available appointment?reschedule appointment   Symptoms: Tx yearly f/u  Would the patient rather a call back or a response via [facility]? Call back  Best Call Back Number:[phone]  Additional Information: [id number]

## 2025-01-28 NOTE — TELEPHONE ENCOUNTER
Contacted pt in regards to message sent over to give him a call, Pt did not answer a vm was left to call back,

## 2025-01-28 NOTE — TELEPHONE ENCOUNTER
LVM to schedule with a mid level informed Pt there was availability for mid February     ----- Message from Scott Moncada MD sent at 1/28/2025  8:09 AM CST -----  With mid level is fine.  ----- Message -----  From: Ana Cristina Cruz LPN  Sent: 1/28/2025   7:24 AM CST  To: Scott Moncada MD    Please advise    PT was scheduled to see you last Friday but did not show, he called and wanted another appointment and we had no availability until May, PT stated he wanted a message sent to you because you wanted him to be seen sooner, so you want him scheduled with another MD or a mid level?      Thank you  Paola Cruz LPN

## 2025-02-05 ENCOUNTER — TELEPHONE (OUTPATIENT)
Dept: CARDIOLOGY | Facility: CLINIC | Age: 70
End: 2025-02-05
Payer: MEDICARE

## 2025-02-05 NOTE — TELEPHONE ENCOUNTER
Attempted to contact patient to schedule him an appointment lvm for him to call us back.    ----- Message from Skylar sent at 2/5/2025 11:29 AM CST -----  Regarding: Appointment  Contact: patient  Per phone call with patient, he stated that he would like to schedule an appointment to see the physician regarding a follow up.  Please return call at [phone].    Thanks,  SJ

## 2025-02-06 ENCOUNTER — TELEPHONE (OUTPATIENT)
Dept: CARDIOLOGY | Facility: CLINIC | Age: 70
End: 2025-02-06
Payer: MEDICARE

## 2025-02-06 NOTE — TELEPHONE ENCOUNTER
WILLIAM to call clinic to schedule an apt    ----- Message from Paola sent at 2/6/2025 11:58 AM CST -----  Contact: self  .Type:  Sooner Apoointment Request    Caller is requesting a sooner appointment.  Caller declined first available appointment listed below.  Caller will not accept being placed on the waitlist and is requesting a message be sent to doctor.  Name of Caller:.Chapo Jacobo  When is the first available appointment?05/29  Symptoms:no symptoms, follow up  Would the patient rather a call back or a response via [facility]? Call back  Best Call Back Number:.[phone]  Additional Information: Chapo is requesting a call back in regards to getting an appointment scheduled

## 2025-02-18 ENCOUNTER — OFFICE VISIT (OUTPATIENT)
Dept: PODIATRY | Facility: CLINIC | Age: 70
End: 2025-02-18
Payer: MEDICARE

## 2025-02-18 DIAGNOSIS — D84.9 IMMUNOSUPPRESSION: ICD-10-CM

## 2025-02-18 DIAGNOSIS — Z94.4 LIVER REPLACED BY TRANSPLANT: ICD-10-CM

## 2025-02-18 DIAGNOSIS — E11.65 UNCONTROLLED TYPE 2 DIABETES MELLITUS WITH HYPERGLYCEMIA: Primary | ICD-10-CM

## 2025-02-18 DIAGNOSIS — L60.3 ONYCHODYSTROPHY: ICD-10-CM

## 2025-02-18 DIAGNOSIS — E11.49 TYPE 2 DIABETES MELLITUS WITH OTHER NEUROLOGIC COMPLICATION, WITHOUT LONG-TERM CURRENT USE OF INSULIN: ICD-10-CM

## 2025-02-18 NOTE — PROGRESS NOTES
Subjective:       Patient ID: Chapo Jacobo is a 69 y.o. male.    Chief Complaint: Routine Foot Care (Patient is a diabetic and denies pain at present. He is wearing closed toe shoes with no socks. )    HPI: Patient presents to the office today with the chief complaint of elongated, thickened and dystrophic nail plates to the B/L foot.  This patient is a Diabetic Type II, complicated with Peripheral Neuropathy and history of liver transplant on long-term immunosuppressive medication.  Currently following with transplant team and Dr. Hope . Patient does follow with Primary Care and/or Endocrinology for management of Diabetes Mellitus. This patient's PMD is Sj Escudero MD. This patient last saw his/her primary care provider on 2/18/2025 with Dr. Gaetano East.    Hemoglobin A1C   Date Value Ref Range Status   01/18/2024 10.5 (H) 4.0 - 5.6 % Final     Comment:     ADA Screening Guidelines:  5.7-6.4%  Consistent with prediabetes  >or=6.5%  Consistent with diabetes    High levels of fetal hemoglobin interfere with the HbA1C  assay. Heterozygous hemoglobin variants (HbS, HgC, etc)do  not significantly interfere with this assay.   However, presence of multiple variants may affect accuracy.     11/01/2016 5.7 4.5 - 6.2 % Final     Comment:     According to ADA guidelines, hemoglobin A1C <7.0% represents  optimal control in non-pregnant diabetic patients.  Different  metrics may apply to specific populations.   Standards of Medical Care in Diabetes - 2016.  For the purpose of screening for the presence of diabetes:  <5.7%     Consistent with the absence of diabetes  5.7-6.4%  Consistent with increasing risk for diabetes   (prediabetes)  >or=6.5%  Consistent with diabetes  Currently no consensus exists for use of hemoglobin A1C  for diagnosis of diabetes for children.     02/16/2006 6.3 (H) 4.5 - 6.2 % Final   .     Review of patient's allergies indicates:   Allergen Reactions    Darvocet a500  [propoxyphene n-acetaminophen]     Paroxetine hcl Other (See Comments)     Other reaction(s): Unknown  Other reaction(s): Unknown  paranoid    Penicillins Other (See Comments)     Other reaction(s): Anaphylaxis  Other reaction(s): Anaphylaxis  As child    Codeine Itching     Other reaction(s): Unknown  Other reaction(s): Unknown       Past Medical History:   Diagnosis Date    Anxiety     Appendicitis     Coronary artery disease of native artery of native heart with stable angina pectoris 01/19/2024    Depression     Diabetes mellitus     Diastolic dysfunction 01/19/2024    Encounter for blood transfusion     Gallbladder & bile duct stone with obstruction     Herpes     PAD (peripheral artery disease) 07/07/2023    Sleep apnea        Family History   Problem Relation Name Age of Onset    Arthritis Mother      Melanoma Father      Cancer Father          melanoma    Fibromyalgia Sister      Cancer Maternal Grandfather          unknown type       Social History     Socioeconomic History    Marital status: Single   Tobacco Use    Smoking status: Every Day     Current packs/day: 1.00     Average packs/day: 1 pack/day for 46.0 years (46.0 ttl pk-yrs)     Types: Cigarettes    Smokeless tobacco: Never   Substance and Sexual Activity    Alcohol use: Yes     Alcohol/week: 2.0 standard drinks of alcohol     Types: 2 Cans of beer per week    Drug use: No    Sexual activity: Yes     Partners: Female       Past Surgical History:   Procedure Laterality Date    ABDOMINAL SURGERY      APPENDECTOMY      bilateral rotator cuff surgery      BRAIN SURGERY      CERVICAL FUSION      CHOLECYSTECTOMY      COLONOSCOPY N/A 7/10/2017    Procedure: COLONOSCOPY;  Surgeon: Viviana Reyes MD;  Location: [facility];  Service: Endoscopy;  Laterality: N/A;    LIVER TRANSPLANT      LUMBAR DISC SURGERY      SELECTIVE INJECTION OF ANESTHETIC AGENT AROUND LUMBAR SPINAL NERVE ROOT BY TRANSFORAMINAL APPROACH Bilateral 9/18/2023    Procedure: Bilateral L4/5 TF  JULIUS;  Surgeon: Bill Read MD;  Location: [facility];  Service: Pain Management;  Laterality: Bilateral;    TONSILLECTOMY         Review of Systems       Objective:   There were no vitals taken for this visit.    Physical Exam  LOWER EXTREMITY PHYSICAL EXAMINATION    VASCULAR:  The right dorsalis pedis pulse 2/4 and the right posterior tibial pulse 2/4.  The left dorsalis pedis pulse 2/4 and posterior tibial pulse on the left is 2/4.  Capillary refill is intact.  Pedal hair growth intact    NEUROLOGY: Protective sensation is not intact to the left and right plantar surfaces of the foot and digits, as the patient has no sensation/detection at greater than 4 distinct points of contact with 5.07 [place]o monofilament. Sensation to light touch is intact on the left and right foot. Proprioception is intact, bilateral. Sensation to pin prick is reduced to absent. Vibratory sensation is diminished.    DERMATOLOGY:  Skin is supple, moist, intact.  There is no signs of callusing, ulcerations, other lesions identified to the dorsal or plantar aspect of the right or left foot.  The R1, 2, 5 and left L1,2, 5 are thickened, discolored dystrophic.  There is subungual debris.  Nail plates have area of dark discoloration.  The remaining nails 3-4 on the right foot and the left foot are elongated but of normal color, thickness, and texture.   There is no signs of ingrowing into the medial or lateral borders.  There is no evidence of wounds or skin breakdown.  No edema or erythema.  No obvious lacerations or fissuring.  Interdigital spaces are clean, dry, intact.  No rashes or scars appreciated.    ORTHOPEDIC: Manual Muscle Testing is 5/5 in all planes on the left and right, without pains, with and without resistance. Gait pattern is non-antalgic.    Assessment:     1. Uncontrolled type 2 diabetes mellitus with hyperglycemia    2. Type 2 diabetes mellitus with other neurologic complication, without long-term current  use of insulin    3. Immunosuppression    4. Liver replaced by transplant    5. Onychodystrophy        Plan:     Uncontrolled type 2 diabetes mellitus with hyperglycemia    Type 2 diabetes mellitus with other neurologic complication, without long-term current use of insulin    Immunosuppression    Liver replaced by transplant    Onychodystrophy        Thorough discussion is had with the patient this afternoon, concerning the diagnosis, its etiology, and the treatment algorithm at present.  Greater than 50% of this visit spent on counseling and coordination of care. Greater than 15 minutes of a 20 minute appointment spent on education about the diabetic foot, neuropathy, and prevention of limb loss.  Shoe inspection. Diabetic Foot Education. Patient reminded of the importance of good nutrition and blood sugar control to help prevent podiatric complications of diabetes. Patient instructed on proper foot hygeine. We discussed wearing proper and supportive shoe gear, daily foot inspections, never walking barefooted or sock footed, never putting sharp instruments to feet which can cause major complications associated with infection, ulcers, lacerations.      Dystrophic nail plates, as outlined above (R#1,2,5  ; L#1,2,5 ), are sharply debrided with double action nail nipper, and/or with the assistance of a mechanical rotary jyoti, with removal of all offending nail and nail border(s), for reduction of pains. Nails are reduced in terms of length, width and girth with removal of subungual debris to facilitate pain free weight bearing and ambulation. The elongated nails as outlined in the objective portion of this note, were trimmed to appropriate length, with a double action nail nipper, for alleviation/reduction of pains as well. Follow up in approx. 3-4 months.    Continue management for long-term immunosuppressive therapy per transplant team.    Defer diabetic pain medication to transplant team      Future Appointments    Date Time Provider Department Center   2/25/2025  1:20 PM Oliver Loomis MD [facility]   3/20/2025  1:45 PM Hope Park MD [facility]   [facility]   4/21/2025  8:30 AM LABORATORY, [facility]

## 2025-02-25 ENCOUNTER — TELEPHONE (OUTPATIENT)
Dept: HEPATOLOGY | Facility: CLINIC | Age: 70
End: 2025-02-25
Payer: MEDICARE

## 2025-02-25 NOTE — TELEPHONE ENCOUNTER
----- Message from Asia sent at 2/25/2025 10:13 AM CST -----  Regarding: appointment access  Contact: Chapo  Type:  Sooner Apoointment RequestCaller is requesting a sooner appointment.  Caller declined first available appointment listed below.  Caller will not accept being placed on the waitlist and is requesting a message be sent to doctor.Name of Caller: Chapo When is the first available appointment?Symptoms:Would the patient rather a call back or a response via [facility]? [facility] Call Back Number:[phone] Information:  Chapo is not feeling well today and had to cancel todays appointment. He would like to be rescheduled.

## 2025-02-28 ENCOUNTER — OFFICE VISIT (OUTPATIENT)
Dept: CARDIOLOGY | Facility: CLINIC | Age: 70
End: 2025-02-28
Payer: MEDICARE

## 2025-02-28 ENCOUNTER — TELEPHONE (OUTPATIENT)
Dept: CARDIOLOGY | Facility: CLINIC | Age: 70
End: 2025-02-28

## 2025-02-28 VITALS
SYSTOLIC BLOOD PRESSURE: 130 MMHG | HEIGHT: 70 IN | HEART RATE: 100 BPM | OXYGEN SATURATION: 95 % | DIASTOLIC BLOOD PRESSURE: 70 MMHG | WEIGHT: 231.69 LBS | BODY MASS INDEX: 33.17 KG/M2

## 2025-02-28 DIAGNOSIS — I71.21 ANEURYSM OF ASCENDING AORTA WITHOUT RUPTURE: ICD-10-CM

## 2025-02-28 DIAGNOSIS — I25.118 CORONARY ARTERY DISEASE OF NATIVE ARTERY OF NATIVE HEART WITH STABLE ANGINA PECTORIS: ICD-10-CM

## 2025-02-28 DIAGNOSIS — I45.10 RBBB: ICD-10-CM

## 2025-02-28 DIAGNOSIS — E66.09 CLASS 1 OBESITY DUE TO EXCESS CALORIES WITH SERIOUS COMORBIDITY AND BODY MASS INDEX (BMI) OF 33.0 TO 33.9 IN ADULT: ICD-10-CM

## 2025-02-28 DIAGNOSIS — E11.51 TYPE 2 DIABETES MELLITUS WITH DIABETIC PERIPHERAL ANGIOPATHY WITHOUT GANGRENE, WITHOUT LONG-TERM CURRENT USE OF INSULIN: ICD-10-CM

## 2025-02-28 DIAGNOSIS — I10 ESSENTIAL HYPERTENSION: ICD-10-CM

## 2025-02-28 DIAGNOSIS — R94.31 ABNORMAL ECG: ICD-10-CM

## 2025-02-28 DIAGNOSIS — R06.09 DOE (DYSPNEA ON EXERTION): ICD-10-CM

## 2025-02-28 DIAGNOSIS — R53.82 CHRONIC FATIGUE: Primary | ICD-10-CM

## 2025-02-28 DIAGNOSIS — Z72.0 TOBACCO ABUSE: ICD-10-CM

## 2025-02-28 DIAGNOSIS — I20.89 OTHER FORMS OF ANGINA PECTORIS: ICD-10-CM

## 2025-02-28 DIAGNOSIS — E66.811 CLASS 1 OBESITY DUE TO EXCESS CALORIES WITH SERIOUS COMORBIDITY AND BODY MASS INDEX (BMI) OF 33.0 TO 33.9 IN ADULT: ICD-10-CM

## 2025-02-28 DIAGNOSIS — I20.9 AP (ANGINA PECTORIS): ICD-10-CM

## 2025-02-28 DIAGNOSIS — Z94.4 LIVER REPLACED BY TRANSPLANT: ICD-10-CM

## 2025-02-28 DIAGNOSIS — I73.9 PAD (PERIPHERAL ARTERY DISEASE): ICD-10-CM

## 2025-02-28 PROCEDURE — 99999 PR PBB SHADOW E&M-EST. PATIENT-LVL III: CPT | Mod: PBBFAC,,, | Performed by: INTERNAL MEDICINE

## 2025-02-28 RX ORDER — ISOSORBIDE MONONITRATE 30 MG/1
30 TABLET, EXTENDED RELEASE ORAL DAILY
Qty: 90 TABLET | Refills: 3 | Status: SHIPPED | OUTPATIENT
Start: 2025-02-28 | End: 2026-02-28

## 2025-02-28 NOTE — TELEPHONE ENCOUNTER
Pt needs Cardiac CTA/ will check [facility] and [facility] CT schedules

## 2025-02-28 NOTE — PROGRESS NOTES
Subjective:    Patient ID:  Chapo Jacobo is a 69 y.o. male who presents for evaluation of Hypertension, Hyperlipidemia, Coronary Artery Disease, and Peripheral Arterial Disease        HPI:  Pt presents for eval.  Current med conditions include DM, HTN, RBBB, aortic aneurysm, PAD, CAD, DD, hyperlipidemia, obesity, COPD, liver transplant 20+  years ago due to HCV.    + Smoker.   Past hx pertinent for following:   He states a doctor Dr. Thomas Clifton at [facility] saw him in 2022 for ascending aortic aneurysm 4.3 cm.  Nuclear stress test March 2020: no ischemia, normal EF.  Echo March 2020 normal LV function.  CTA chest Sept 2022 (ER): 4.4 cm aortic aneurysm, no dissection, COPD.  Ecg 9/28/22 NSR, RBBB, possible old inferior infarct.  Nuclear stress test Aug 2023 personally reviewed: attenuation defect, no ischemia, normal EF.  Echo Aug 2023 personally reviewed: normal EF, grade I DD.  B LE arterial vasc studies Aug 2023 personally reviewed: Right distal SFA demonstrates severe (76-99%) stenosis and has monophasic flow. POP demonstrates moderate (50-75%) stenosis and has monophasic flow; Left lower extremity, no hemodynamically significant stenosis  The right resting LUISA reduction is moderately to severely decreased. LUISA 0.66; The left resting LUISA reduction is normal.  Now here.  Fatigued, no energy -- worse.  Has poor sleep habits.  Did not want to use CPAP.  Uses sl ntg prn for angina/CP.  3 - 4 episodes last 3 - 4 months.  Ntg helps.  Has chronic stress/anxiety.  Chronic IRIZARRY.  Still smokes.  Weight loss needed.  Sensory issues in feet.            Current Outpatient Medications:     alprazolam (XANAX) 1 MG tablet, Take 1 mg by mouth 2 (two) times daily. Patient takes 1 tablet twice daily, Disp: , Rfl: 1    amitriptyline (ELAVIL) 25 MG tablet, Take 1 tablet (25 mg total) by mouth every evening., Disp: 30 tablet, Rfl: 11    aspirin (ECOTRIN) 81 MG EC tablet, Take 1 tablet (81 mg total) by mouth once daily., Disp: 30  tablet, Rfl: 12    cyclobenzaprine (FLEXERIL) 10 MG tablet, Take 0.5 tablets (5 mg total) by mouth 3 (three) times daily as needed for Muscle spasms., Disp: 15 tablet, Rfl: 0    dextroamphetamine-amphetamine 30 mg Tab, Take 1 tablet by mouth as needed., Disp: , Rfl:     FLUoxetine 20 MG capsule, Take 4 capsules by mouth Daily. , Disp: , Rfl:     gabapentin (NEURONTIN) 100 MG capsule, Take 100 mg by mouth 3 (three) times daily., Disp: , Rfl:     glimepiride (AMARYL) 4 MG tablet, Take 1 tablet by mouth as needed. , Disp: , Rfl:     hydrocortisone 2.5 % cream, , Disp: , Rfl: 0    isosorbide mononitrate (IMDUR) 30 MG 24 hr tablet, Take 1 tablet (30 mg total) by mouth once daily., Disp: 90 tablet, Rfl: 3    JARDIANCE 25 mg tablet, Take 25 mg by mouth., Disp: , Rfl:     levocetirizine (XYZAL) 5 MG tablet, Take 5 mg by mouth every evening., Disp: , Rfl:     losartan (COZAAR) 50 MG tablet, Take 50 mg by mouth once daily., Disp: , Rfl:     metformin (GLUCOPHAGE) 1000 MG tablet, Take 1 tablet by mouth Daily., Disp: , Rfl:     mupirocin (BACTROBAN) 2 % ointment, , Disp: , Rfl:     nitroGLYCERIN (NITROSTAT) 0.4 MG SL tablet, Place 1 tablet (0.4 mg total) under the tongue every 5 (five) minutes as needed for Chest pain., Disp: 30 tablet, Rfl: 12    omega-3 acid ethyl esters (LOVAZA) 1 gram capsule, Take 1 capsule by mouth 3 (three) times daily., Disp: , Rfl:     pioglitazone (ACTOS) 15 MG tablet, Take 15 mg by mouth., Disp: , Rfl:     rosuvastatin (CRESTOR) 5 MG tablet, Take 5 mg by mouth., Disp: , Rfl:     tacrolimus (PROGRAF) 1 MG Cap, Take 2 capsules (2 mg total) by mouth every morning AND 1 capsule (1 mg total) every evening., Disp: 270 capsule, Rfl: 3    tamsulosin (FLOMAX) 0.4 mg Cap, Take 1 capsule (0.4 mg total) by mouth once daily., Disp: 30 capsule, Rfl: 0    triamcinolone acetonide 0.1% (KENALOG) 0.1 % Lotn, , Disp: , Rfl:     valacyclovir (VALTREX) 500 MG tablet, Take 500 mg by mouth once daily. , Disp: , Rfl:  "      Review of Systems   Constitutional: Positive for malaise/fatigue.   HENT: Negative.     Eyes: Negative.    Cardiovascular:  Positive for chest pain and dyspnea on exertion.   Respiratory:  Positive for shortness of breath and snoring.    Endocrine: Negative.    Hematologic/Lymphatic: Negative.    Skin: Negative.    Musculoskeletal:  Positive for arthritis and joint pain.   Gastrointestinal: Negative.    Genitourinary: Negative.    Neurological:  Positive for sensory change and weakness.   Psychiatric/Behavioral:  Positive for depression.    Allergic/Immunologic: Negative.        /70 (BP Location: Right arm, Patient Position: Sitting)   Pulse 100   Ht 5' 10" (1.778 m)   Wt 105.1 kg (231 lb 11.3 oz)   SpO2 95%   BMI 33.25 kg/m²     Wt Readings from Last 3 Encounters:   02/28/25 105.1 kg (231 lb 11.3 oz)   09/26/24 103 kg (227 lb 1.2 oz)   06/06/24 103.1 kg (227 lb 4.7 oz)     Temp Readings from Last 3 Encounters:   06/06/24 97.8 °F (36.6 °C) (Tympanic)   09/18/23 97.8 °F (36.6 °C) (Temporal)   09/28/22 97.9 °F (36.6 °C) (Oral)     BP Readings from Last 3 Encounters:   02/28/25 130/70   09/26/24 126/74   06/06/24 (!) 156/67     Pulse Readings from Last 3 Encounters:   02/28/25 100   09/26/24 84   06/06/24 85          Objective:    Physical Exam  Vitals and nursing note reviewed.   Constitutional:       Appearance: He is well-developed. He is obese.   HENT:      Head: Normocephalic.   Neck:      Thyroid: No thyromegaly.      Vascular: No carotid bruit or JVD.   Cardiovascular:      Rate and Rhythm: Normal rate and regular rhythm.      Pulses:           Radial pulses are 2+ on the right side and 2+ on the left side.      Heart sounds: S1 normal and S2 normal. Heart sounds not distant. No midsystolic click and no opening snap. No murmur heard.     No friction rub. No S3 or S4 sounds.   Pulmonary:      Effort: Pulmonary effort is normal.      Breath sounds: Normal breath sounds. No wheezing or rales. "   Abdominal:      General: Bowel sounds are normal. There is no distension or abdominal bruit.      Palpations: Abdomen is soft.      Tenderness: There is no abdominal tenderness.   Musculoskeletal:      Cervical back: Neck supple.   Skin:     General: Skin is warm.   Neurological:      Mental Status: He is alert and oriented to person, place, and time.   Psychiatric:         Behavior: Behavior normal.         I have reviewed all pertinent labs and cardiac studies.      Chemistry        Component Value Date/Time     01/17/2025 0918    K 4.2 01/17/2025 0918     01/17/2025 0918    CO2 22 (L) 01/17/2025 0918    BUN 15 01/17/2025 0918    CREATININE 1.1 01/17/2025 0918     (H) 01/17/2025 0918        Component Value Date/Time    CALCIUM 9.2 01/17/2025 0918    ALKPHOS 78 01/17/2025 0918    AST 22 01/17/2025 0918    ALT 17 01/17/2025 0918    BILITOT 0.5 01/17/2025 0918    ESTGFRAFRICA >60 04/06/2022 0840    EGFRNONAA >60 04/06/2022 0840        Lab Results   Component Value Date    WBC 8.12 01/17/2025    HGB 18.1 (H) 01/17/2025    HCT 54.2 (H) 01/17/2025    MCV 94 01/17/2025     01/17/2025     Lab Results   Component Value Date    TSH 2.879 01/18/2024             Lab Results   Component Value Date    HGBA1C 10.5 (H) 01/18/2024     Lab Results   Component Value Date    CHOL 153 01/18/2024    CHOL 188 11/01/2016    CHOL 118 (L) 12/27/2007     Lab Results   Component Value Date    HDL 33 (L) 01/18/2024    HDL 29 (L) 11/01/2016    HDL 24 (L) 12/27/2007     Lab Results   Component Value Date    LDLCALC 57.2 (L) 01/18/2024    LDLCALC 109.8 11/01/2016    LDLCALC 57.8 (L) 12/27/2007     Lab Results   Component Value Date    TRIG 314 (H) 01/18/2024    TRIG 246 (H) 11/01/2016    TRIG 181 (H) 12/27/2007     Lab Results   Component Value Date    CHOLHDL 21.6 01/18/2024    CHOLHDL 15.4 (L) 11/01/2016    CHOLHDL 20.3 12/27/2007           Results for orders placed during the hospital encounter of  08/18/23    Echo    Interpretation Summary    Left Ventricle: The left ventricle is normal in size. Normal wall thickness. Normal wall motion. There is normal systolic function with a visually estimated ejection fraction of 55 - 70%. Grade II diastolic dysfunction.    Right Ventricle: Normal right ventricular cavity size. Wall thickness is normal. Right ventricle wall motion  is normal. Systolic function is normal.    Mitral Valve: There is no stenosis.    Tricuspid Valve: There is mild regurgitation.    IVC/SVC: Intermediate venous pressure at 8 mmHg.        Results for orders placed during the hospital encounter of 08/18/23    Nuclear Stress - Cardiology Interpreted    Interpretation Summary    Normal myocardial perfusion scan. There is no evidence of myocardial ischemia or infarction.    There is a  moderate intensity fixed perfusion abnormality in the apical and inferoapical wall of the left ventricle secondary to diaphragm attenuation.    The gated perfusion images showed an ejection fraction of 57% at rest. The gated perfusion images showed an ejection fraction of 66% post stress.    The ECG portion of the study is negative for ischemia.    The patient reported no chest pain during the stress test.    During stress, occasional PACs are noted.        Assessment:       1. Chronic fatigue    2. AP (angina pectoris)    3. Aneurysm of ascending aorta without rupture    4. Abnormal ECG    5. Essential hypertension    6. IRIZARRY (dyspnea on exertion)    7. Type 2 diabetes mellitus with diabetic peripheral angiopathy without gangrene, without long-term current use of insulin    8. Coronary artery disease of native artery of native heart with stable angina pectoris    9. Class 1 obesity due to excess calories with serious comorbidity and body mass index (BMI) of 33.0 to 33.9 in adult    10. RBBB    11. PAD (peripheral artery disease)    12. Tobacco abuse    13. Other forms of angina pectoris    14. Liver replaced by  transplant         Plan:           Coronary CTA asap to evaluate for obstructive CAD and aortic aneurysm.  Consider LHC if occlusive CAD noted on CTA.  Risks/benefits of LHC/PCI discussed.  Echocardiogram.  Restart Imdur 30 mg qd.  Discussed his PAD in detail, severe PAD R LE.  Medical tx for PAD advised.  81 mg asa qd.  Chest pain/angina:  no clear ischemia on Aug 2023 nuclear stress test.  Sublingual nitroglycerin 0.4 mg for concerning chest pain episodes or breakthrough angina.  Patient advised of indications, side effects of nitroglycerin and when to report to ER.   IRIZARRY: multifactorial -- CAD, obesity, deconditioning, etc and likely has LETICIA, and has COPD.  Aortic aneurysm: Stable on Sept 2022 CTA.  Will reevaluate with f/u CT scan in future.  Reviewed all tests and above medical conditions with patient in detail and formulated treatment plan.  Continue optimal medical treatment for cardiovascular conditions.  Cardiac low salt diet advised.  Daily exercise encouraged, with the goal 30 +  minutes aerobic exercise as tolerated.  Maintaining healthy weight and weight loss goals (if needed) were discussed in clinic.  HTN: Need for BP control and HTN goals (if needed) were discussed and tx plan formulated.  Goal < 130/80.  Continue current HTN meds.  Lipids: Statin tx.  Tobacco abuse: smoking cessation advised.  Abnl ecg: Stable. Monitor.  DM: optimal HGAIC control advised.  Continue current DM meds and f/u w PCP for mgt.  Liver transplant:  f/u with GI/Hepatology service as advised.         PHONE REVIEW.        I have reviewed all pertinent labs and cardiac studies independently. Plans and recommendations have been formulated under my direct supervision. All questions answered and patient voiced understanding.

## 2025-03-03 ENCOUNTER — TELEPHONE (OUTPATIENT)
Dept: HEPATOLOGY | Facility: CLINIC | Age: 70
End: 2025-03-03
Payer: MEDICARE

## 2025-03-03 NOTE — TELEPHONE ENCOUNTER
----- Message from Monica sent at 3/3/2025  1:23 PM CST -----  Contact: pt  Type:  Sooner Apoointment RequestCaller is requesting a sooner appointment.  Caller declined first available appointment listed below.  Caller will not accept being placed on the waitlist and is requesting a message be sent to doctor.Name of Caller: ptWhen is the first available appointment?  9/2025Symptoms: yearlyWould the patient rather a call back or a response via [facility]?  [facility] Call Back Number: [phone] Information: pt states this is urgent  ----- Message -----  From: Monica Greenberg  Sent: 3/3/2025   1:24 PM CST  To: Vladislav Boyd V Staff    Type:  Sooner Apoointment RequestCaller is requesting a sooner appointment.  Caller declined first available appointment listed below.  Caller will not accept being placed on the waitlist and is requesting a message be sent to doctor.Name of Caller: ptWhen is the first available appointment?  9/2025Symptoms: yearlyWould the patient rather a call back or a response via [facility]?  [facility] Call Back Number: [phone] Information:

## 2025-03-03 NOTE — TELEPHONE ENCOUNTER
Called patient and received no answer. Left voicemail informing he has an appointment on 04/01/2025 and if he needed a sooner appointment that he would need to contact us.  Don't know the reason why he needs sooner appointment.

## 2025-03-06 DIAGNOSIS — I20.89 OTHER FORMS OF ANGINA PECTORIS: Primary | ICD-10-CM

## 2025-03-06 DIAGNOSIS — I20.9 AP (ANGINA PECTORIS): ICD-10-CM

## 2025-03-06 DIAGNOSIS — Z29.9 PREVENTIVE MEDICATION THERAPY NEEDED: ICD-10-CM

## 2025-03-06 DIAGNOSIS — R07.89 ATYPICAL CHEST PAIN: ICD-10-CM

## 2025-03-06 RX ORDER — METOPROLOL TARTRATE 50 MG/1
TABLET ORAL
Qty: 2 TABLET | Refills: 0 | Status: SHIPPED | OUTPATIENT
Start: 2025-03-06

## 2025-03-12 ENCOUNTER — TELEPHONE (OUTPATIENT)
Dept: HEPATOLOGY | Facility: CLINIC | Age: 70
End: 2025-03-12
Payer: MEDICARE

## 2025-03-12 NOTE — TELEPHONE ENCOUNTER
----- Message from Claudia sent at 3/12/2025  1:41 PM CDT -----  Contact: SEMAJ BURDEN [[id number]]  ...Type:  Patient Rescheduling Appointment Who Called: SEMAJ BURDEN [[id number]]Date of appointment?: 4/1Why they can't make it:  pt would like to see if there is a later appt.Would the patient rather a call back or a response via [facility]?  [employer] Call Back Number: [phone] (home) Additional Information:

## 2025-03-20 ENCOUNTER — HOSPITAL ENCOUNTER (OUTPATIENT)
Dept: CARDIOLOGY | Facility: HOSPITAL | Age: 70
Discharge: HOME | End: 2025-03-20
Attending: INTERNAL MEDICINE
Payer: MEDICARE

## 2025-03-20 ENCOUNTER — OFFICE VISIT (OUTPATIENT)
Dept: DERMATOLOGY | Facility: CLINIC | Age: 70
End: 2025-03-20
Payer: MEDICARE

## 2025-03-20 VITALS
HEIGHT: 70 IN | BODY MASS INDEX: 33.07 KG/M2 | DIASTOLIC BLOOD PRESSURE: 70 MMHG | SYSTOLIC BLOOD PRESSURE: 130 MMHG | WEIGHT: 231 LBS

## 2025-03-20 DIAGNOSIS — Z12.83 SKIN CANCER SCREENING: ICD-10-CM

## 2025-03-20 DIAGNOSIS — R06.09 DOE (DYSPNEA ON EXERTION): ICD-10-CM

## 2025-03-20 DIAGNOSIS — Z72.0 TOBACCO ABUSE: ICD-10-CM

## 2025-03-20 DIAGNOSIS — E66.811 CLASS 1 OBESITY DUE TO EXCESS CALORIES WITH SERIOUS COMORBIDITY AND BODY MASS INDEX (BMI) OF 33.0 TO 33.9 IN ADULT: ICD-10-CM

## 2025-03-20 DIAGNOSIS — I25.118 CORONARY ARTERY DISEASE OF NATIVE ARTERY OF NATIVE HEART WITH STABLE ANGINA PECTORIS: ICD-10-CM

## 2025-03-20 DIAGNOSIS — E66.09 CLASS 1 OBESITY DUE TO EXCESS CALORIES WITH SERIOUS COMORBIDITY AND BODY MASS INDEX (BMI) OF 33.0 TO 33.9 IN ADULT: ICD-10-CM

## 2025-03-20 DIAGNOSIS — L82.1 SEBORRHEIC KERATOSES: ICD-10-CM

## 2025-03-20 DIAGNOSIS — I20.9 AP (ANGINA PECTORIS): ICD-10-CM

## 2025-03-20 DIAGNOSIS — I45.10 RBBB: ICD-10-CM

## 2025-03-20 DIAGNOSIS — B07.9 VERRUCA VULGARIS: ICD-10-CM

## 2025-03-20 DIAGNOSIS — I10 ESSENTIAL HYPERTENSION: ICD-10-CM

## 2025-03-20 DIAGNOSIS — L81.4 SOLAR LENTIGO: ICD-10-CM

## 2025-03-20 DIAGNOSIS — R94.31 ABNORMAL ECG: ICD-10-CM

## 2025-03-20 DIAGNOSIS — I71.21 ANEURYSM OF ASCENDING AORTA WITHOUT RUPTURE: ICD-10-CM

## 2025-03-20 DIAGNOSIS — L40.9 PSORIASIS: Primary | ICD-10-CM

## 2025-03-20 DIAGNOSIS — L57.0 ACTINIC KERATOSES: ICD-10-CM

## 2025-03-20 DIAGNOSIS — R53.82 CHRONIC FATIGUE: ICD-10-CM

## 2025-03-20 DIAGNOSIS — E11.51 TYPE 2 DIABETES MELLITUS WITH DIABETIC PERIPHERAL ANGIOPATHY WITHOUT GANGRENE, WITHOUT LONG-TERM CURRENT USE OF INSULIN: ICD-10-CM

## 2025-03-20 LAB
AORTIC ROOT ANNULUS: 3.81 CM
ASCENDING AORTA: 4.06 CM
AV INDEX (PROSTH): 0.88
AV MEAN GRADIENT: 4 MMHG
AV PEAK GRADIENT: 7 MMHG
AV VALVE AREA BY VELOCITY RATIO: 2.9 CM²
AV VALVE AREA: 3 CM²
AV VELOCITY RATIO: 0.85
BSA FOR ECHO PROCEDURE: 2.27 M2
CV ECHO LV RWT: 0.43 CM
DOP CALC AO PEAK VEL: 1.3 M/S
DOP CALC AO VTI: 25.8 CM
DOP CALC LVOT AREA: 3.5 CM2
DOP CALC LVOT DIAMETER: 2.1 CM
DOP CALC LVOT PEAK VEL: 1.1 M/S
DOP CALC LVOT STROKE VOLUME: 78.2 CM3
DOP CALC RVOT PEAK VEL: 0.73 M/S
DOP CALC RVOT VTI: 10.9 CM
DOP CALCLVOT PEAK VEL VTI: 22.6 CM
E WAVE DECELERATION TIME: 232 MSEC
E/A RATIO: 0.82
E/E' RATIO: 9 M/S
ECHO LV POSTERIOR WALL: 1 CM (ref 0.6–1.1)
FRACTIONAL SHORTENING: 34 % (ref 28–44)
INTERVENTRICULAR SEPTUM: 1 CM (ref 0.6–1.1)
IVC DIAMETER: 1.97 CM
IVRT: 100 MSEC
LA MAJOR: 5 CM
LA MINOR: 5.8 CM
LA WIDTH: 3.8 CM
LEFT ATRIUM AREA SYSTOLIC (APICAL 2 CHAMBER): 19.83 CM2
LEFT ATRIUM AREA SYSTOLIC (APICAL 4 CHAMBER): 17.76 CM2
LEFT ATRIUM SIZE: 3.8 CM
LEFT ATRIUM VOLUME INDEX MOD: 25 ML/M2
LEFT ATRIUM VOLUME INDEX: 30 ML/M2
LEFT ATRIUM VOLUME MOD: 56 ML
LEFT ATRIUM VOLUME: 66 CM3
LEFT INTERNAL DIMENSION IN SYSTOLE: 3.1 CM (ref 2.1–4)
LEFT VENTRICLE DIASTOLIC VOLUME INDEX: 45.95 ML/M2
LEFT VENTRICLE DIASTOLIC VOLUME: 102 ML
LEFT VENTRICLE END SYSTOLIC VOLUME APICAL 2 CHAMBER: 55.41 ML
LEFT VENTRICLE END SYSTOLIC VOLUME APICAL 4 CHAMBER: 49.33 ML
LEFT VENTRICLE MASS INDEX: 74.1 G/M2
LEFT VENTRICLE SYSTOLIC VOLUME INDEX: 17.6 ML/M2
LEFT VENTRICLE SYSTOLIC VOLUME: 39 ML
LEFT VENTRICULAR INTERNAL DIMENSION IN DIASTOLE: 4.7 CM (ref 3.5–6)
LEFT VENTRICULAR MASS: 164.5 G
LV LATERAL E/E' RATIO: 7.5 M/S
LV SEPTAL E/E' RATIO: 11.7 M/S
LVED V (TEICH): 101.93 ML
LVES V (TEICH): 39.25 ML
LVOT MG: 2.76 MMHG
LVOT MV: 0.77 CM/S
MV PEAK A VEL: 1 M/S
MV PEAK E VEL: 0.82 M/S
MV STENOSIS PRESSURE HALF TIME: 67.23 MS
MV VALVE AREA P 1/2 METHOD: 3.27 CM2
OHS CV RV/LV RATIO: 0.49 CM
PISA TR MAX VEL: 2.3 M/S
PV MEAN GRADIENT: 1 MMHG
PV PEAK GRADIENT: 3 MMHG
PV PEAK VELOCITY: 0.84 M/S
RA MAJOR: 4.53 CM
RA PRESSURE ESTIMATED: 3 MMHG
RA WIDTH: 3.42 CM
RIGHT VENTRICLE DIASTOLIC BASEL DIMENSION: 2.3 CM
RIGHT VENTRICULAR END-DIASTOLIC DIMENSION: 2.26 CM
RV TB RVSP: 5 MMHG
SINUS: 3.6 CM
STJ: 3.27 CM
TDI LATERAL: 0.11 M/S
TDI SEPTAL: 0.07 M/S
TDI: 0.09 M/S
TR MAX PG: 22 MMHG
TRICUSPID ANNULAR PLANE SYSTOLIC EXCURSION: 1.88 CM
TV REST PULMONARY ARTERY PRESSURE: 24 MMHG
Z-SCORE OF LEFT VENTRICULAR DIMENSION IN END DIASTOLE: -5.01
Z-SCORE OF LEFT VENTRICULAR DIMENSION IN END SYSTOLE: -3.3

## 2025-03-20 PROCEDURE — 99999 PR PBB SHADOW E&M-EST. PATIENT-LVL III: CPT | Mod: PBBFAC,,, | Performed by: STUDENT IN AN ORGANIZED HEALTH CARE EDUCATION/TRAINING PROGRAM

## 2025-03-20 PROCEDURE — 93306 TTE W/DOPPLER COMPLETE: CPT

## 2025-03-20 RX ORDER — HYDROCORTISONE 25 MG/G
CREAM TOPICAL 2 TIMES DAILY
Qty: 30 G | Refills: 1 | Status: SHIPPED | OUTPATIENT
Start: 2025-03-20

## 2025-03-20 RX ORDER — CLOBETASOL PROPIONATE 0.5 MG/G
OINTMENT TOPICAL 2 TIMES DAILY
Qty: 60 G | Refills: 2 | Status: SHIPPED | OUTPATIENT
Start: 2025-03-20

## 2025-03-20 RX ORDER — KETOCONAZOLE 20 MG/G
CREAM TOPICAL 2 TIMES DAILY
Qty: 30 G | Refills: 1 | Status: SHIPPED | OUTPATIENT
Start: 2025-03-20

## 2025-03-20 NOTE — PROGRESS NOTES
Patient Information  Name: Chapo Jacobo  : 1955  MRN: [id number]     Referring Physician:  Dr. Foy   Primary Care Physician:  Silvestre Trinh MD   Date of Visit: 2025      Subjective:       Chapo Jacobo is a 69 y.o. male who presents for   Chief Complaint   Patient presents with    Spot     C/o spots on right armpit, chest and back     Warts     C/o warts on penis     Skin Check     TBSE family hx of melanoma      HPI  Patient here for skin check.     Does patient have a personal hx of skin cancers? no  Does patient have family hx of melanoma?  yes  Does patient have hx of strong sun exposure or tanning bed use in the past? Yes    Hx of liver transplant, on prograf. This is a high risk patient here to check for the development of new lesions.    Patient is here with concern of: skin lesions  Location: right armpit, chest, back  Duration: years  Symptoms: none  Prior treatments: none      Patient was last seen:Visit date not found     Prior notes by myself reviewed.   Clinical documentation obtained by nursing staff reviewed.    Review of Systems   Skin:  Positive for itching and rash.        Objective:    Physical Exam   Constitutional: He appears well-developed and well-nourished. No distress.   Neurological: He is alert and oriented to person, place, and time. He is not disoriented.   Psychiatric: He has a normal mood and affect.   Skin:   Areas Examined (abnormalities noted in diagram):   Scalp / Hair Palpated and Inspected  Head / Face Inspection Performed  Neck Inspection Performed  Chest / Axilla Inspection Performed  Abdomen Inspection Performed  Genitals / Buttocks / Groin Inspection Performed  Back Inspection Performed  RUE Inspected  LUE Inspection Performed  RLE Inspected  LLE Inspection Performed  Nails and Digits Inspection Performed                       Diagram Legend     Erythematous scaling macule/papule c/w actinic keratosis       Vascular papule c/w angioma       Pigmented verrucoid papule/plaque c/w seborrheic keratosis      Yellow umbilicated papule c/w sebaceous hyperplasia      Irregularly shaped tan macule c/w lentigo     1-2 mm smooth white papules consistent with Milia      Movable subcutaneous cyst with punctum c/w epidermal inclusion cyst      Subcutaneous movable cyst c/w pilar cyst      Firm pink to brown papule c/w dermatofibroma      Pedunculated fleshy papule(s) c/w skin tag(s)      Evenly pigmented macule c/w junctional nevus     Mildly variegated pigmented, slightly irregular-bordered macule c/w mildly atypical nevus      Flesh colored to evenly pigmented papule c/w intradermal nevus       Pink pearly papule/plaque c/w basal cell carcinoma      Erythematous hyperkeratotic cursted plaque c/w SCC      Surgical scar with no sign of skin cancer recurrence      Open and closed comedones      Inflammatory papules and pustules      Verrucoid papule consistent consistent with wart     Erythematous eczematous patches and plaques     Dystrophic onycholytic nail with subungual debris c/w onychomycosis     Umbilicated papule    Erythematous-base heme-crusted tan verrucoid plaque consistent with inflamed seborrheic keratosis     Erythematous Silvery Scaling Plaque c/w Psoriasis     See annotation      No images are attached to the encounter or orders placed in the encounter.    [] Data reviewed  [] Independent review of test  [] Management discussed with another provider    Assessment / Plan:        Psoriasis  -     ketoconazole (NIZORAL) 2 % cream; Apply topically 2 (two) times daily. Mix with hydrocortisone cream and AAA on beard BID for up to 2 weeks at a time  Dispense: 30 g; Refill: 1  -     hydrocortisone 2.5 % cream; Apply topically 2 (two) times daily. Mix with ketoconazole cream and AAA on beard BID for up to 2 weeks at a time  Dispense: 30 g; Refill: 1  -     clobetasol 0.05% (TEMOVATE) 0.05 % Oint; Apply topically 2 (two) times daily. Use on knees  Dispense: 60  g; Refill: 2  Counseling on topical steroids:  Patient counseled that the prolonged use of topical steroids can result in the increased appearance superficial blood vessels (telangiectasias) lightening (hypopigmentation), and   thinning of the skin ( atrophy).  Patient understands to avoid using high potency steroids in skin folds, the groin or the face.  The patient verbalized understanding of proper use and possible adverse effects of topical steroids.  All patient's questions and concerns were addressed.    Skin cancer screening  Total body skin examination performed today including at least 12 points as noted in physical examination. No lesions suspicious for malignancy noted.    Recommend daily sun protection/avoidance, use of at least SPF 30, broad spectrum sunscreen (OTC drug), skin self examinations, and routine physician surveillance to optimize early detection    Actinic keratoses  Cryosurgery Procedure Note    Verbal consent from the patient is obtained including, but not limited to, risk of hypopigmentation/hyperpigmentation, scar, recurrence of lesion. The patient is aware of the precancerous quality and need for treatment of these lesions. Liquid nitrogen cryosurgery is applied to the 5 actinic keratoses, as detailed in the physical exam, to produce a freeze injury. The patient is aware that blisters may form and is instructed on wound care with gentle cleansing and use of vaseline ointment to keep moist until healed. The patient is supplied a handout on cryosurgery and is instructed to call if lesions do not completely resolve.    Seborrheic keratoses  These are benign inherited growths without a malignant potential. Reassurance given to patient. No treatment is necessary.     Verruca vulgaris  Cryosurgery procedure note:    Verbal consent from the patient is obtained including, but not limited to, risk of hypopigmentation/hyperpigmentation, scar, recurrence of lesion. Liquid nitrogen cryosurgery is  applied to 2 lesions to produce a freeze injury. The patient is aware that blisters may form and is instructed on wound care with gentle cleansing and use of vaseline ointment to keep moist until healed. The patient is supplied a handout on cryosurgery and is instructed to call if lesions do not completely resolve.    Solar lentigo  This is a benign hyperpigmented sun induced lesion. Recommend daily sun protection/avoidance and use of at least SPF 30, broad spectrum sunscreen (OTC drug) will reduce the number of new lesions. Treatment of these benign lesions are considered cosmetic.             LOS NUMBER AND COMPLEXITY OF PROBLEMS    COMPLEXITY OF DATA RISK TOTAL TIME (m)   13667  08994 [] 1 self-limited or minor problem [x] Minimal to none [] No treatment recommended or patient to monitor 15-29  10-19   24705  26698 Low  [] 2 or > self limited or minor problems  [] 1 stable chronic illness  [] 1 acute, uncomplicated illness or injury Limited (2)  [] Prior external notes from each unique source  [] Review result of each unique test  [] Order each unique test []  Low  OTC medications, minor skin biopsy 30-44  20-29   75206  23874 Moderate  [x]  1 or > chronic illness with progression, exacerbation or SE of treatment  [x]  2 or more stable chronic illnesses  []  1 acute illness with systemic symptoms  []  1 acute complicated injury  []  1 undiagnosed new problem with uncertain prognosis Moderate (1/3 below)  []  3 or more data items        *Now includes assessment requiring independent historian  []  Independent interpretation of a test  []  Discuss management/test with another provider Moderate  [x]  Prescription drug mgmt  []  Minor surgery with risk discussed  []  Mgmt limited by social determinates 45-59  30-39   79831  97748 High  []  1 or more chronic illness with severe exacerbation, progression or SE of treatment  []  1 acute or chronic illness/injury that poses a threat to life or bodily function Extensive  (2/3 below)  []  3 or more data items        *Now includes assessment requiring independent historian.  []  Independent interpretation of a test  []  Discuss management/test with another provider High  []  Major surgery with risk discussed  []  Drug therapy requiring intensive monitoring for toxicity  []  Hospitalization  []  Decision for DNR 60-74  40-54      No follow-ups on file.    Hope Park MD, FAAD  [facility]

## 2025-03-24 RX ORDER — AMITRIPTYLINE HYDROCHLORIDE 25 MG/1
25 TABLET, FILM COATED ORAL NIGHTLY
Qty: 30 TABLET | Refills: 11 | Status: SHIPPED | OUTPATIENT
Start: 2025-03-24

## 2025-03-27 ENCOUNTER — TELEPHONE (OUTPATIENT)
Dept: CARDIOLOGY | Facility: CLINIC | Age: 70
End: 2025-03-27
Payer: MEDICARE

## 2025-03-27 NOTE — TELEPHONE ENCOUNTER
Please advise on echo results and any other recommendations. Thank you.         ----- Message from Elise sent at 3/27/2025  1:33 PM CDT -----  Contact: Chapo  Type:  Test ResultsWho Called: ChapoName of Test (Lab/Mammo/Etc): TRANSTHORACIC ECHO COMPLETE Date of Test: 3/20/2025Ordering Provider: Where the test was performed: [facility], CardiologyWould the patient rather a call back or a response via [facility]? Call [facility] Call Back Number: [phone]!

## 2025-03-27 NOTE — TELEPHONE ENCOUNTER
Need NUC results from [facility] done 3/26 @ 8am and ECHO done here 3/20/205  -- pt asking for interpreted results    Called [facility] radiology to get NUC uploaded to power share - Sophy [phone]  Then called our rsd dept so they can accept the power share so it can be viewed    Need to call O[facility] in AM @ [phone] to accept the power share from [facility] so Dr Moncada can view NUC    Please call pt with results                      ----- Message from CIHI sent at 3/27/2025  4:03 PM CDT -----  Contact: self [phone]  Type:  Test ResultsWho Called: CurtisName of Test (Lab/Mammo/Etc): ECHO Date of Test: 3/20Ordering Provider: Dr MoncadaWould the patient rather a call back or a response via [facility]? Call [facility] Call Back Number: [phone] Information:

## 2025-03-28 NOTE — TELEPHONE ENCOUNTER
Called william to inform his to accept the powershare from [facility] stated he does not know what that is but he will look into it

## 2025-03-28 NOTE — TELEPHONE ENCOUNTER
Contacted pt and informed we received the testing and that we will follow up once they have been read . PT stated verbal understanding    ----- Message from Skylar sent at 3/28/2025  9:19 AM CDT -----  Regarding: Results  Contact: patient  Type:  Test ResultsWho Called: CutisName of Test (Lab/Mammo/Etc):  Ultrasound and dye in the armDate of Test: 03/26/2025Ordering Provider: Dr Moncada Where the test was performed:  [facility] Would the patient rather a call back or a response via [facility]?  Call [facility] Call Back Number:  [phone] Additional Information:  AICHA Sellers

## 2025-03-30 ENCOUNTER — TELEPHONE (OUTPATIENT)
Dept: CARDIOLOGY | Facility: CLINIC | Age: 70
End: 2025-03-30
Payer: MEDICARE

## 2025-03-30 DIAGNOSIS — I20.9 AP (ANGINA PECTORIS): Primary | ICD-10-CM

## 2025-03-30 RX ORDER — ISOSORBIDE MONONITRATE 60 MG/1
60 TABLET, EXTENDED RELEASE ORAL DAILY
Qty: 90 TABLET | Refills: 3 | Status: SHIPPED | OUTPATIENT
Start: 2025-03-30 | End: 2026-03-30

## 2025-03-30 NOTE — TELEPHONE ENCOUNTER
Please call pt.  Coronary CTA results reviewed from [facility].    Moderate LAD stenosis.  Rest looks ok.  Medical tx advised as seems under the cutoff for stents.    Increase Imdur from 30 mg daily to 60 mg daily and this will help prevent symptoms.    Schedule f/u appt in 3 months.    Thanks    Dr Moncada

## 2025-03-31 ENCOUNTER — TELEPHONE (OUTPATIENT)
Dept: CARDIOLOGY | Facility: CLINIC | Age: 70
End: 2025-03-31
Payer: MEDICARE

## 2025-03-31 NOTE — TELEPHONE ENCOUNTER
Contacted  PT and reviewed results and recommendations-Moderate LAD stenosis.  Rest looks ok.  Medical tx advised as seems under the cutoff for stents.     Increase Imdur from 30 mg daily to 60 mg daily and this will help prevent symptoms.     Schedule f/u appt in 3 months.    PT stated verbal understanding    PT is scheduled for 3 month follow up    ----- Message from [employer] sent at 3/31/2025  2:44 PM CDT -----  Contact: Chapo  Type:  Patient Returning CallWho Called:Sydney Left Message for Patient:Ana Cristina Cruz LPNDoes the patient know what this is regarding?:test resultsWould the patient rather a call back or a response via [facility]? Call [facility] Call Back Number:[phone] Information: He ask please call him back as soon as possible.Thanks!

## 2025-03-31 NOTE — TELEPHONE ENCOUNTER
lvm for PT to call the clinic to review results and recommendations-Coronary CTA results reviewed from [facility].     Moderate LAD stenosis.  Rest looks ok.  Medical tx advised as seems under the cutoff for stents.     Increase Imdur from 30 mg daily to 60 mg daily and this will help prevent symptoms.     Schedule f/u appt in 3 months.    No Mychart message to leave

## 2025-04-01 ENCOUNTER — TELEPHONE (OUTPATIENT)
Dept: HEPATOLOGY | Facility: CLINIC | Age: 70
End: 2025-04-01
Payer: MEDICARE

## 2025-04-01 NOTE — TELEPHONE ENCOUNTER
Called patient and received no answer. No option for voicemail.  Rescheduled the paitents appointment

## 2025-04-01 NOTE — TELEPHONE ENCOUNTER
----- Message from Jessica sent at 4/1/2025 11:26 AM CDT -----  Contact: Chapo  Type:  Patient Requesting a call back Who Called:Chapo What is the call back request regarding?:pt would like to schedule appt on today 4/1/25 due to him not feeling wellWould the patient rather a call back or a response via [facility] Call Back Number:[phone] Information:Pt would like a call back tomorrowPlease call for additional information  ----- Message -----  From: Jessica Olmos  Sent: 4/1/2025  11:27 AM CDT  To: Vladislav Boyd V Staff    Type:  Patient Requesting a call back Who Called:Chapo What is the call back request regarding?:pt would like to schedule appt on today 4/1/25 due to him not feeling wellWould the patient rather a call back or a response via [facility] Call Back Number:[phone] Information:Please call for additional information

## 2025-04-02 ENCOUNTER — TELEPHONE (OUTPATIENT)
Dept: HEPATOLOGY | Facility: CLINIC | Age: 70
End: 2025-04-02
Payer: MEDICARE

## 2025-04-02 NOTE — TELEPHONE ENCOUNTER
----- Message from Jessica sent at 4/2/2025  1:26 PM CDT -----  Contact: Chapo  Type:  Patient Requesting a call back Who Called:Chapo What is the call back request regarding?:people would like to reschedule cancelled appt that was for 4/1/25 Would the patient rather a call back or a response via [facility] Call Back Number:[phone] Information:Please call for additional information

## 2025-04-02 NOTE — TELEPHONE ENCOUNTER
Called patient and informed of new appointment time on 4/29/25.  The patient agreed with date, time and location of appointment.

## 2025-04-22 ENCOUNTER — LAB VISIT (OUTPATIENT)
Dept: LAB | Facility: HOSPITAL | Age: 70
End: 2025-04-22
Attending: INTERNAL MEDICINE
Payer: MEDICARE

## 2025-04-22 DIAGNOSIS — Z94.4 LIVER TRANSPLANTED: ICD-10-CM

## 2025-04-22 LAB
ABSOLUTE EOSINOPHIL (OHS): 0.24 K/UL
ABSOLUTE MONOCYTE (OHS): 0.63 K/UL (ref 0.3–1)
ABSOLUTE NEUTROPHIL COUNT (OHS): 3.85 K/UL (ref 1.8–7.7)
ALBUMIN SERPL BCP-MCNC: 3.7 G/DL (ref 3.5–5.2)
ALP SERPL-CCNC: 75 UNIT/L (ref 40–150)
ALT SERPL W/O P-5'-P-CCNC: 15 UNIT/L (ref 10–44)
ANION GAP (OHS): 9 MMOL/L (ref 8–16)
AST SERPL-CCNC: 18 UNIT/L (ref 11–45)
BASOPHILS # BLD AUTO: 0.1 K/UL
BASOPHILS NFR BLD AUTO: 1.5 %
BILIRUB SERPL-MCNC: 0.4 MG/DL (ref 0.1–1)
BUN SERPL-MCNC: 14 MG/DL (ref 8–23)
CALCIUM SERPL-MCNC: 9 MG/DL (ref 8.7–10.5)
CHLORIDE SERPL-SCNC: 103 MMOL/L (ref 95–110)
CO2 SERPL-SCNC: 26 MMOL/L (ref 23–29)
CREAT SERPL-MCNC: 1.1 MG/DL (ref 0.5–1.4)
ERYTHROCYTE [DISTWIDTH] IN BLOOD BY AUTOMATED COUNT: 13.6 % (ref 11.5–14.5)
GFR SERPLBLD CREATININE-BSD FMLA CKD-EPI: >60 ML/MIN/1.73/M2
GLUCOSE SERPL-MCNC: 140 MG/DL (ref 70–110)
HCT VFR BLD AUTO: 52.2 % (ref 40–54)
HGB BLD-MCNC: 17.1 GM/DL (ref 14–18)
IMM GRANULOCYTES # BLD AUTO: 0.02 K/UL (ref 0–0.04)
IMM GRANULOCYTES NFR BLD AUTO: 0.3 % (ref 0–0.5)
LYMPHOCYTES # BLD AUTO: 1.96 K/UL (ref 1–4.8)
MCH RBC QN AUTO: 30.4 PG (ref 27–31)
MCHC RBC AUTO-ENTMCNC: 32.8 G/DL (ref 32–36)
MCV RBC AUTO: 93 FL (ref 82–98)
NUCLEATED RBC (/100WBC) (OHS): 0 /100 WBC
PLATELET # BLD AUTO: 187 K/UL (ref 150–450)
PMV BLD AUTO: 12.7 FL (ref 9.2–12.9)
POTASSIUM SERPL-SCNC: 4.1 MMOL/L (ref 3.5–5.1)
PROT SERPL-MCNC: 7.4 GM/DL (ref 6–8.4)
RBC # BLD AUTO: 5.63 M/UL (ref 4.6–6.2)
RELATIVE EOSINOPHIL (OHS): 3.5 %
RELATIVE LYMPHOCYTE (OHS): 28.8 % (ref 18–48)
RELATIVE MONOCYTE (OHS): 9.3 % (ref 4–15)
RELATIVE NEUTROPHIL (OHS): 56.6 % (ref 38–73)
SODIUM SERPL-SCNC: 138 MMOL/L (ref 136–145)
WBC # BLD AUTO: 6.8 K/UL (ref 3.9–12.7)

## 2025-04-22 PROCEDURE — 36415 COLL VENOUS BLD VENIPUNCTURE: CPT | Mod: PO

## 2025-04-22 PROCEDURE — 80053 COMPREHEN METABOLIC PANEL: CPT

## 2025-04-22 PROCEDURE — 80197 ASSAY OF TACROLIMUS: CPT

## 2025-04-22 PROCEDURE — 85025 COMPLETE CBC W/AUTO DIFF WBC: CPT

## 2025-04-23 LAB — TACROLIMUS BLD-MCNC: 6.4 NG/ML (ref 5–15)

## 2025-04-24 ENCOUNTER — RESULTS FOLLOW-UP (OUTPATIENT)
Dept: HEPATOLOGY | Facility: HOSPITAL | Age: 70
End: 2025-04-24
Payer: MEDICARE

## 2025-04-24 DIAGNOSIS — C22.0 HCC (HEPATOCELLULAR CARCINOMA): ICD-10-CM

## 2025-04-24 DIAGNOSIS — Z94.4 STATUS POST LIVER TRANSPLANT: Primary | ICD-10-CM

## 2025-04-25 NOTE — TELEPHONE ENCOUNTER
Continue routine labs no changes needed.  Letter sent for next lab appointment 07/21/25        ----- Message from Oliver Loomis MD sent at 4/24/2025  9:13 AM CDT -----  Liver transplant blood tests reviewed.  Labs stable,   no change in immunosuppression.   Please continue to monitor liver tests per transplant protocol.    ----- Message -----  From: Lab, Background User  Sent: 4/22/2025   8:30 PM CDT  To: Oliver Loomis MD

## 2025-04-27 PROBLEM — C22.0 HCC (HEPATOCELLULAR CARCINOMA): Status: ACTIVE | Noted: 2025-04-27

## 2025-04-27 NOTE — PROGRESS NOTES
Transplant Hepatology  Liver Transplant Recipient Follow Up    PATIENT: Chapo Jacobo  MRN: [id number]  DATE: 4/29/2025    Provider: Hepatologist - Dr Loomis  Referring provider: No ref. provider found    Transplant History  Transplant Date: 12/29/2000  UNOS Native Liver Dx: Cirrhosis: Type B-, HBsAG+     Chapo is here for follow up of his liver transplant.     ORGAN: LIVER  Whole or Partial: whole liver    Chief complaint: follow up, history of OLT    Subjective:   Chapo Jacobo is a 69 y.o. 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 25 years Post Transplant     04/29/2025  Patient is new to me and seen by Dr Plascencia in past  He has not had recent hospitalizations or ED visits. He reports compliance with Immunosuppression.   He denies recent fever, chills, nausea, vomiting, abdominal pain, diarrhea, headache, tremors.  He is without complaints today.    He significant CAD so he has been taking nitroglycerin and following Cardiologist. He has followed up with CT surgery who is doing yearly monitoring of his aortic aneurysm.     Allograft function:  Allograft function :   ALT   Date Value Ref Range Status   04/22/2025 15 10 - 44 unit/L Final   01/17/2025 17 10 - 44 U/L Final     AST   Date Value Ref Range Status   04/22/2025 18 11 - 45 unit/L Final   01/17/2025 22 10 - 40 U/L Final     Alkaline Phosphatase   Date Value Ref Range Status   01/17/2025 78 40 - 150 U/L Final     ALP   Date Value Ref Range Status   04/22/2025 75 40 - 150 unit/L Final     Tacrolimus   Date Value Ref Range Status   04/22/2025 6.4 5.0 - 15.0 ng/mL Final     Comment:     Testing performed by a chemiluminescent microparticle   immunoassay on the [employer] i System.    CAUTION: No firm therapeutic range exists for tacrolimus in whole blood. The complexity of the clinical state, individual differences in sensitivity to immunosuppressive and nephrotoxic effects of  tacrolimus, co-administration of other immunosuppressants, type of transplant, time post-transplant and a number of other factors contribute to different requirements for optimal blood levels of tacrolimus. Therefore, individual tacrolimus values cannot be used as the sole indicator for making changes in treatment regimen and each patient should be thoroughly evaluated clinically before changes in treatment regimens are made. Each user must establish his or her own ranges based on clinical experience.  Therapeutic ranges vary according to the commercial test used, and therefore should be established for each commercial test. Values obtained with different assay methods cannot be used interchangeably due to differences in assay methods and cross-reactivity with metabolites, nor should correction factors be applied. Therefore, consistent use of one assay for individual patients is recommended.       Tacrolimus [facility]   Date Value Ref Range Status   01/17/2025 6.3 5.0 - 15.0 ng/mL Final     Comment:     Testing performed by a chemiluminescent microparticle   immunoassay on the [employer] i System.    CAUTION: No firm therapeutic range exists for tacrolimus in whole   blood. The   complexity of the clinical state, individual differences in   sensitivity to   immunosuppressive and nephrotoxic effects of tacrolimus,   co-administration   of other immunosuppressants, type of transplant, time post-transplant   and a   number of other factors contribute to different requirements for   optimal   blood levels of tacrolimus. Therefore, individual tacrolimus values   cannot   be used as the sole indicator for making changes in treatment regimen   and   each patient should be thoroughly evaluated clinically before changes   in   treatment regimens are made. Each user must establish his or her own   ranges   based on clinical experience.  Therapeutic ranges vary according to the commercial test used, and   therefore   should be  established for each commercial test. Values obtained with   different assay methods cannot be used interchangeably due to   differences in   assay methods and cross-reactivity with metabolites, nor should   correction   factors be applied. Therefore, consistent use of one assay for   individual   patients is recommended.          Immunosuppressions  Tacrolimus:                                          yes 2/1/  MMF:                                                   no  Prednisone:                                         no  Cyclosporine:                                       no  Sirolimus:                                             no  Everolimus:                                          no    Post-LT Complications  Biliary anastomotic stricture:               no  HAT:                                                    no  HA stenosis:                                        no  PV stenosis:                                        no    New diabetes onset between last follow-up to the current follow-up: Yes. Insulin dependent: No  Did patient have any acute rejection episodes during the follow-up period: No  Post transplant malignancy: No        Post-LT Recommended Screening Tests  Dermatology check up:                           no  Colonoscopy:                                          yes  Bone Mineral Density (BMD):                 yes  HbA1C:                                                    yes      Prior Relevant History:      Review of systems:  A review of 12+ systems was conducted with pertinent positive and negative findings documented in HPI with all other systems reviewed and negative.    Past Medical History:   Past Medical History:   Diagnosis Date    Anxiety     Appendicitis     Coronary artery disease of native artery of native heart with stable angina pectoris 01/19/2024    Depression     Diabetes mellitus     Diastolic dysfunction 01/19/2024    Encounter for blood transfusion     Gallbladder & bile  duct stone with obstruction     Herpes     PAD (peripheral artery disease) 07/07/2023    Sleep apnea        Past Surgical HIstory:   Past Surgical History:   Procedure Laterality Date    ABDOMINAL SURGERY      APPENDECTOMY      bilateral rotator cuff surgery      BRAIN SURGERY      CERVICAL FUSION      CHOLECYSTECTOMY      COLONOSCOPY N/A 7/10/2017    Procedure: COLONOSCOPY;  Surgeon: Viviana Reyes MD;  Location: [facility] ENDO;  Service: Endoscopy;  Laterality: N/A;    LIVER TRANSPLANT      LUMBAR DISC SURGERY      SELECTIVE INJECTION OF ANESTHETIC AGENT AROUND LUMBAR SPINAL NERVE ROOT BY TRANSFORAMINAL APPROACH Bilateral 9/18/2023    Procedure: Bilateral L4/5 TF JULIUS;  Surgeon: Bill Read MD;  Location: [facility] PAIN MGT;  Service: Pain Management;  Laterality: Bilateral;    TONSILLECTOMY         Family History:   Family History   Problem Relation Name Age of Onset    Arthritis Mother      Melanoma Father      Cancer Father          melanoma    Fibromyalgia Sister      Cancer Maternal Grandfather          unknown type       Social History:  reports that he has been smoking cigarettes. He has a 46 pack-year smoking history. He has been exposed to tobacco smoke. He has never used smokeless tobacco. He reports that he does not currently use alcohol after a past usage of about 2.0 standard drinks of alcohol per week. He reports current drug use. Drug: Marijuana.    PFSH:  Past medical, family, and social history reviewed as documented in chart with pertinent positive medical, family, and social history detailed in HPI.    Allergies:  Review of patient's allergies indicates:   Allergen Reactions    Darvocet a500 [propoxyphene n-acetaminophen]     Paroxetine hcl Other (See Comments)     Other reaction(s): Unknown  Other reaction(s): Unknown  paranoid    Penicillins Other (See Comments)     Other reaction(s): Anaphylaxis  Other reaction(s): Anaphylaxis  As child    Codeine Itching     Other reaction(s): Unknown  Other  "reaction(s): Unknown       Medications:  Current Medications[1]    Review of Systems   Constitutional:  Negative for fatigue, fever and unexpected weight change.   HENT:  Negative for ear pain, nosebleeds and trouble swallowing.    Eyes:  Negative for discharge and redness.   Respiratory:  Negative for cough and shortness of breath.    Cardiovascular:  Negative for palpitations and leg swelling.   Gastrointestinal:  Negative for abdominal distention, abdominal pain, diarrhea and vomiting.   Endocrine: Negative for cold intolerance and polyuria.   Genitourinary:  Negative for flank pain and hematuria.   Musculoskeletal:  Negative for back pain.   Skin:  Negative for pallor.   Neurological:  Negative for seizures and headaches.   Hematological:  Does not bruise/bleed easily.   Psychiatric/Behavioral:  Negative for confusion and hallucinations.          UNOS Patient Status  Functional Status: 80% - Normal activity with effort: some symptoms of disease  Physical Capacity: No Limitations      Objective:      Vitals:   Vitals:    04/29/25 1351   BP: 122/68   BP Location: Right arm   Patient Position: Sitting   Pulse: 92   SpO2: 95%   Weight: 103.8 kg (228 lb 11.6 oz)   Height: 5' 10" (1.778 m)       Physical Exam    Laboratory Data:  No visits with results within 1 Week(s) from this visit.   Latest known visit with results is:   Lab Visit on 04/22/2025   Component Date Value Ref Range Status    Sodium 04/22/2025 138  136 - 145 mmol/L Final    Potassium 04/22/2025 4.1  3.5 - 5.1 mmol/L Final    Chloride 04/22/2025 103  95 - 110 mmol/L Final    CO2 04/22/2025 26  23 - 29 mmol/L Final    Glucose 04/22/2025 140 (H)  70 - 110 mg/dL Final    BUN 04/22/2025 14  8 - 23 mg/dL Final    Creatinine 04/22/2025 1.1  0.5 - 1.4 mg/dL Final    Calcium 04/22/2025 9.0  8.7 - 10.5 mg/dL Final    Protein Total 04/22/2025 7.4  6.0 - 8.4 gm/dL Final    Albumin 04/22/2025 3.7  3.5 - 5.2 g/dL Final    Bilirubin Total 04/22/2025 0.4  0.1 - 1.0 " mg/dL Final    For infants and newborns, interpretation of results should be based   on gestational age, weight and in agreement with clinical   observations.    Premature Infant recommended reference ranges:   0-24 hours:  <8.0 mg/dL   24-48 hours: <12.0 mg/dL   3-5 days:    <15.0 mg/dL   6-29 days:   <15.0 mg/dL    ALP 04/22/2025 75  40 - 150 unit/L Final    AST 04/22/2025 18  11 - 45 unit/L Final    ALT 04/22/2025 15  10 - 44 unit/L Final    Anion Gap 04/22/2025 9  8 - 16 mmol/L Final    eGFR 04/22/2025 >60  >60 mL/min/1.73/m2 Final    Estimated GFR calculated using the CKD-EPI creatinine (2021) equation.    Tacrolimus 04/22/2025 6.4  5.0 - 15.0 ng/mL Final    Comment: Testing performed by a chemiluminescent microparticle   immunoassay on the [employer] i System.    CAUTION: No firm therapeutic range exists for tacrolimus in whole blood. The complexity of the clinical state, individual differences in sensitivity to immunosuppressive and nephrotoxic effects of tacrolimus, co-administration of other immunosuppressants, type of transplant, time post-transplant and a number of other factors contribute to different requirements for optimal blood levels of tacrolimus. Therefore, individual tacrolimus values cannot be used as the sole indicator for making changes in treatment regimen and each patient should be thoroughly evaluated clinically before changes in treatment regimens are made. Each user must establish his or her own ranges based on clinical experience.  Therapeutic ranges vary according to the commercial test used, and therefore should be established for each commercial test. Values obtained with different assay methods cannot be used                            interchangeably due to differences in assay methods and cross-reactivity with metabolites, nor should correction factors be applied. Therefore, consistent use of one assay for individual patients is recommended.      WBC 04/22/2025 6.80  3.90 -  12.70 K/uL Final    RBC 04/22/2025 5.63  4.60 - 6.20 M/uL Final    HGB 04/22/2025 17.1  14.0 - 18.0 gm/dL Final    HCT 04/22/2025 52.2  40.0 - 54.0 % Final    MCV 04/22/2025 93  82 - 98 fL Final    MCH 04/22/2025 30.4  27.0 - 31.0 pg Final    MCHC 04/22/2025 32.8  32.0 - 36.0 g/dL Final    RDW 04/22/2025 13.6  11.5 - 14.5 % Final    Platelet Count 04/22/2025 187  150 - 450 K/uL Final    MPV 04/22/2025 12.7  9.2 - 12.9 fL Final    Nucleated RBC 04/22/2025 0  <=0 /100 WBC Final    Neut % 04/22/2025 56.6  38 - 73 % Final    Lymph % 04/22/2025 28.8  18 - 48 % Final    Mono % 04/22/2025 9.3  4 - 15 % Final    Eos % 04/22/2025 3.5  <=8 % Final    Basophil % 04/22/2025 1.5  <=1.9 % Final    Imm Grans % 04/22/2025 0.3  0.0 - 0.5 % Final    Neut # 04/22/2025 3.85  1.8 - 7.7 K/uL Final    Lymph # 04/22/2025 1.96  1 - 4.8 K/uL Final    Mono # 04/22/2025 0.63  0.3 - 1 K/uL Final    Eos # 04/22/2025 0.24  <=0.5 K/uL Final    Baso # 04/22/2025 0.10  <=0.2 K/uL Final    Imm Grans # 04/22/2025 0.02  0.00 - 0.04 K/uL Final    Mild elevation in immature granulocytes is non specific and can be seen in a variety of conditions including stress response, acute inflammation, trauma and pregnancy. Correlation with other laboratory and clinical findings is essential.       Lab Results   Component Value Date    WBC 6.80 04/22/2025    HGB 17.1 04/22/2025    HCT 52.2 04/22/2025    MCV 93 04/22/2025     04/22/2025       Lab Results   Component Value Date     04/22/2025    K 4.1 04/22/2025     04/22/2025    CO2 26 04/22/2025    BUN 14 04/22/2025    CREATININE 1.1 04/22/2025    CALCIUM 9.0 04/22/2025    ANIONGAP 9 04/22/2025    ESTGFRAFRICA >60 04/06/2022    EGFRNONAA >60 04/06/2022       Lab Results   Component Value Date    ALT 15 04/22/2025    AST 18 04/22/2025    GGT 36 05/15/2017    ALKPHOS 75 04/22/2025    BILITOT 0.4 04/22/2025       Lab Results   Component Value Date    TACROLIMUS 6.4 04/22/2025    TACROLIMUS 6.3  01/17/2025         I personally reviewed imaging studies and outside records..        Assessment:       1. Status post liver transplant    2. HCC (hepatocellular carcinoma)    3. Therapeutic drug monitoring    4. Immunosuppression    5. Polyp of colon, unspecified part of colon, unspecified type             Plan:       Post OLT -Allograft Function  - Stable graft function with normal LFTs in 4/25    Immunosuppression   - Continue the current Immunosuppression.   --Additional testing to be completed according to Written Order Guidelines for Post-Liver and Combined Liver/Kidney Transplant Follow-up (LI-09)      3. Drug therapy requiring intensive monitoring for toxicity    -High Risk Medication Monitoring encounter    -No current medication related issues, no evidence of toxicity      4. Health Maintenance/Screening:  - Recommend age appropriate cancer screening  - Skin cancer: Recommend use of sunscreen SPF 30 or higher, hat and sunglasses while outside, dermatologist visit annually or sooner if any concerning lesions.  - Osteoporosis: Recommend bone density testing every 2-3 years if previously normal or annually if previously abnormal.  -Dental: Cleaning andfollow up every 6 month      Education:   Material provided to the patient.  Patient reminded to call with any health changes since these can be early signs of significant complications.  Also, I advised the patient to be sure any new medications or changes of old medications are discussed with either a pharmacist or physician knowledgeable with transplant to avoid rejection/drug toxicity related to significant drug interactions.    Patient advised that it is recommended that all transplant candidates, and their close contacts and household members receive Covid vaccination    Return to clinic in 12 months.    I have sent communication to the referring physician and/or primary care provider.    Time spent on the day of this encounter preparing for, treating and  managing this patient and over half of that time was spent on counseling and coordination of care.  30 minutes were spend reviewing the previous labs and outside records. I also educated the patient about lifestyle modifications. I have provided the patient with an opportunity to ask questions and have all questions answered to his satisfaction.     Patient was seen in the liver transplant department at [facility] Mey Loomis MD  Transplant Hepatology & Gastroenterology  [facility] [place]           [1]   Current Outpatient Medications   Medication Sig Dispense Refill    alprazolam (XANAX) 1 MG tablet Take 1 mg by mouth 2 (two) times daily. Patient takes 1 tablet twice daily  1    amitriptyline (ELAVIL) 25 MG tablet TAKE 1 TABLET BY MOUTH EVERY EVENING 30 tablet 11    clobetasol 0.05% (TEMOVATE) 0.05 % Oint Apply topically 2 (two) times daily. Use on knees 60 g 2    cyclobenzaprine (FLEXERIL) 10 MG tablet Take 0.5 tablets (5 mg total) by mouth 3 (three) times daily as needed for Muscle spasms. 15 tablet 0    FLUoxetine 20 MG capsule Take 4 capsules by mouth Daily.       gabapentin (NEURONTIN) 100 MG capsule Take 100 mg by mouth 3 (three) times daily.      glimepiride (AMARYL) 4 MG tablet Take 1 tablet by mouth as needed.       hydrocortisone 2.5 % cream Apply topically 2 (two) times daily. Mix with ketoconazole cream and AAA on beard BID for up to 2 weeks at a time 30 g 1    isosorbide mononitrate (IMDUR) 60 MG 24 hr tablet Take 1 tablet (60 mg total) by mouth once daily. 90 tablet 3    JARDIANCE 25 mg tablet Take 25 mg by mouth.      ketoconazole (NIZORAL) 2 % cream Apply topically 2 (two) times daily. Mix with hydrocortisone cream and AAA on beard BID for up to 2 weeks at a time 30 g 1    levothyroxine (SYNTHROID) 25 MCG tablet Take 25 mcg by mouth every morning.      losartan (COZAAR) 50 MG tablet Take 50 mg by mouth once daily.      metformin (GLUCOPHAGE) 1000  MG tablet Take 1 tablet by mouth Daily.      mupirocin (BACTROBAN) 2 % ointment       nitroGLYCERIN (NITROSTAT) 0.4 MG SL tablet Place 1 tablet (0.4 mg total) under the tongue every 5 (five) minutes as needed for Chest pain. 30 tablet 12    pioglitazone (ACTOS) 15 MG tablet Take 15 mg by mouth.      rosuvastatin (CRESTOR) 5 MG tablet Take 5 mg by mouth.      tacrolimus (PROGRAF) 1 MG Cap Take 2 capsules (2 mg total) by mouth every morning AND 1 capsule (1 mg total) every evening. 270 capsule 3    tamsulosin (FLOMAX) 0.4 mg Cap Take 1 capsule (0.4 mg total) by mouth once daily. 30 capsule 0    triamcinolone acetonide 0.1% (KENALOG) 0.1 % Lotn       valacyclovir (VALTREX) 500 MG tablet Take 500 mg by mouth once daily.       aspirin (ECOTRIN) 81 MG EC tablet Take 1 tablet (81 mg total) by mouth once daily. (Patient not taking: Reported on 4/29/2025) 30 tablet 12    dextroamphetamine-amphetamine 30 mg Tab Take 1 tablet by mouth as needed. (Patient not taking: Reported on 4/29/2025)      hydrocortisone 2.5 % cream   0    levocetirizine (XYZAL) 5 MG tablet Take 5 mg by mouth every evening. (Patient not taking: Reported on 4/29/2025)      metoprolol tartrate (LOPRESSOR) 50 MG tablet Take one tablet at 8PM the evening before and one tablet at 7AM the morning before  CTA (Patient not taking: Reported on 4/29/2025) 2 tablet 0    omega-3 acid ethyl esters (LOVAZA) 1 gram capsule Take 1 capsule by mouth 3 (three) times daily. (Patient not taking: Reported on 4/29/2025)       No current facility-administered medications for this visit.

## 2025-04-29 ENCOUNTER — OFFICE VISIT (OUTPATIENT)
Dept: HEPATOLOGY | Facility: CLINIC | Age: 70
End: 2025-04-29
Payer: MEDICARE

## 2025-04-29 VITALS
HEART RATE: 92 BPM | SYSTOLIC BLOOD PRESSURE: 122 MMHG | HEIGHT: 70 IN | WEIGHT: 228.75 LBS | BODY MASS INDEX: 32.75 KG/M2 | OXYGEN SATURATION: 95 % | DIASTOLIC BLOOD PRESSURE: 68 MMHG

## 2025-04-29 DIAGNOSIS — K63.5 POLYP OF COLON, UNSPECIFIED PART OF COLON, UNSPECIFIED TYPE: ICD-10-CM

## 2025-04-29 DIAGNOSIS — Z94.4 STATUS POST LIVER TRANSPLANT: Primary | ICD-10-CM

## 2025-04-29 DIAGNOSIS — D84.9 IMMUNOSUPPRESSION: ICD-10-CM

## 2025-04-29 DIAGNOSIS — Z51.81 THERAPEUTIC DRUG MONITORING: ICD-10-CM

## 2025-04-29 DIAGNOSIS — C22.0 HCC (HEPATOCELLULAR CARCINOMA): ICD-10-CM

## 2025-04-29 PROCEDURE — 3074F SYST BP LT 130 MM HG: CPT | Mod: CPTII,S$GLB,, | Performed by: INTERNAL MEDICINE

## 2025-04-29 PROCEDURE — 1160F RVW MEDS BY RX/DR IN RCRD: CPT | Mod: CPTII,S$GLB,, | Performed by: INTERNAL MEDICINE

## 2025-04-29 PROCEDURE — 99999 PR PBB SHADOW E&M-EST. PATIENT-LVL III: CPT | Mod: PBBFAC,,, | Performed by: INTERNAL MEDICINE

## 2025-04-29 PROCEDURE — 3078F DIAST BP <80 MM HG: CPT | Mod: CPTII,S$GLB,, | Performed by: INTERNAL MEDICINE

## 2025-04-29 PROCEDURE — 3008F BODY MASS INDEX DOCD: CPT | Mod: CPTII,S$GLB,, | Performed by: INTERNAL MEDICINE

## 2025-04-29 PROCEDURE — 99204 OFFICE O/P NEW MOD 45 MIN: CPT | Mod: S$GLB,,, | Performed by: INTERNAL MEDICINE

## 2025-04-29 PROCEDURE — 1159F MED LIST DOCD IN RCRD: CPT | Mod: CPTII,S$GLB,, | Performed by: INTERNAL MEDICINE

## 2025-04-29 PROCEDURE — 1126F AMNT PAIN NOTED NONE PRSNT: CPT | Mod: CPTII,S$GLB,, | Performed by: INTERNAL MEDICINE

## 2025-04-29 RX ORDER — LEVOTHYROXINE SODIUM 25 UG/1
25 TABLET ORAL EVERY MORNING
COMMUNITY
Start: 2024-12-31

## 2025-04-29 NOTE — PATIENT INSTRUCTIONS
Maintenance:  -Instructed to f/u with PCP for regular health maintenance care including cancer screenings and BMD  -Reviewed need to avoid sun exposure with use of sunblock, hats, long sleeves related to increased risk of skin cancers  -Recommend f/u with Dermatology for annual skin checks  - continue anti-hypertensive agents, and check with PCP, goal /80 mm Hg  - post liver transplant counseling      Liver transplant recipients should avoid the consumption of water from lakes and rivers.    Liver transplant recipients should avoid unpasteurized milk products and raw and undercooked eggs and meats (particularly uncooked pork, poultry, fish,  and seafood.    Liver transplant recipients should avoid high-risk pets, which include rodents, reptiles,chicks, ducklings, and birds.    Liver transplant recipients should take precautions to prevent vector (including mosquito) -borne diseases. These include avoiding going out during  peak mosquito feeding times (keshav and dusk) and using N,P-aafhjajppab-udbrcgbpb-containing insect repellants.    For female Liver Transplant recipients of a child-bearing age, preconception counseling about contraception and the risks and outcomes of  pregnancy should start in the pretransplant period and should be reinforced after transplantation.    Contraception should begin before the resumption of sexual activity, although no particular form of contraception can be recommended  over another.    The treatment of diabetes mellitus after LT should aim for a hemoglobin A1c (HbA1c) target goal of <7.0% with a combination of lifestyle modifications and pharmacological  agents as appropriate.    In the first 5 years after transplantation, screening by bone mineral density (BMD) should be done yearly for osteopenic patients and every  2 to 3 years for patients with normal BMD; thereafter, screening depends on the progression of BMD and on risk factors.    The osteopenic LT recipient should  perform regular weight-bearing exercise and receive calcium and vitamin D supplements. Bisphosphonate therapy should be  considered in LT recipients with osteoporosis or recent fractures.      Monitoring of renal function in LT recipients for the detection and management of chronic kidney disease should use  an estimating equation to evaluate the glomerular filtration rate.      The treatment of hypertension should aim for a target goal of 130/80 mm Hg with a combination of lifestyle modifications and  pharmacological agents as appropriate.      The measurement of blood lipids after a 14-hour fast is recommended annually for healthy LT recipients. An elevated LDL >100 mg/dL, with or without  hypertriglyceridemia, requires therapy. If therapeutic lifestyle and dietary changes are not enough, statin therapy should be  introduced. Suboptimal control with statins can be improved by the addition of ezetimibe.    Isolated hypertriglyceridemia is first treated with omega-3 fatty acids (up to 4 g daily if tolerated). If this is not sufficient for  control, gemfibrozil or fenofibrate can be added.    Cancer surveillance

## 2025-04-30 ENCOUNTER — TELEPHONE (OUTPATIENT)
Dept: TRANSPLANT | Facility: CLINIC | Age: 70
End: 2025-04-30
Payer: MEDICARE

## 2025-04-30 ENCOUNTER — TELEPHONE (OUTPATIENT)
Dept: DERMATOLOGY | Facility: CLINIC | Age: 70
End: 2025-04-30
Payer: MEDICARE

## 2025-04-30 NOTE — TELEPHONE ENCOUNTER
Returned call to pt. Pt scheduled.----- Message from Cody Hopkins sent at 4/30/2025  9:32 AM CDT -----  Regarding: appt access  PATIENT CALLPt called to schedule EP appt. CC: multiple skin tags, would like to consult for removal. No appts in EPIC. Please call back at [phone], afternoon appts preferred.

## 2025-04-30 NOTE — TELEPHONE ENCOUNTER
Progress  notes noted.  Snap shot reviewed and up to date.      ----- Message from Oliver Loomis MD sent at 4/29/2025  2:12 PM CDT -----  No change in IS

## 2025-05-02 ENCOUNTER — HOSPITAL ENCOUNTER (OUTPATIENT)
Dept: PREADMISSION TESTING | Facility: HOSPITAL | Age: 70
Discharge: HOME | End: 2025-05-02
Attending: INTERNAL MEDICINE
Payer: MEDICARE

## 2025-05-02 DIAGNOSIS — K63.5 POLYP OF COLON, UNSPECIFIED PART OF COLON, UNSPECIFIED TYPE: ICD-10-CM

## 2025-05-08 ENCOUNTER — HOSPITAL ENCOUNTER (OUTPATIENT)
Dept: PREADMISSION TESTING | Facility: HOSPITAL | Age: 70
Discharge: HOME | End: 2025-05-08
Attending: INTERNAL MEDICINE
Payer: MEDICARE

## 2025-05-08 DIAGNOSIS — K63.5 POLYP OF COLON, UNSPECIFIED PART OF COLON, UNSPECIFIED TYPE: Primary | ICD-10-CM

## 2025-05-08 RX ORDER — SODIUM, POTASSIUM,MAG SULFATES 17.5-3.13G
1 SOLUTION, RECONSTITUTED, ORAL ORAL DAILY
Qty: 1 KIT | Refills: 0 | Status: SHIPPED | OUTPATIENT
Start: 2025-05-08 | End: 2025-05-10

## 2025-05-09 ENCOUNTER — E-CONSULT (OUTPATIENT)
Dept: CARDIOLOGY | Facility: HOSPITAL | Age: 70
End: 2025-05-09
Payer: MEDICARE

## 2025-05-09 DIAGNOSIS — Z01.810 PREOP CARDIOVASCULAR EXAM: Primary | ICD-10-CM

## 2025-05-09 NOTE — CONSULTS
[facility] BR CARDIOLOGY  Response for E-Consult     Patient Name: Chapo Jacobo  MRN: [id number]  Primary Care Provider: Silvestre Carrasco MD   Requesting Provider: Deysi Chahal NP  E-Consult to General Cardiology  Consult performed by: Abhinav Mcfarland MD  Consult ordered by: Deysi Chahal NP          E consult for preop clearance of colonoscopy  The chart reviewed.  PMH PAD htn CAD  09/24 EKG nsr RBBB  03/25 echo EF nml.    Plan  Elevated periop risk of CV events for non-high risk procedure.  Ok to proceed the scheduled procedure without further cardiac study.  OK to hold ASA 5 days before the procedure and resume postop once hemodynamically stable      Total time of Consultation: 10 minute    I did not speak to the requesting provider verbally about this.     *This eConsult is based on the clinical data available to me and is furnished without benefit of a physical examination. The eConsult will need to be interpreted in light of any clinical issues or changes in patient status not available to me at the time of filing this eConsults. Significant changes in patient condition or level of acuity should result in immediate formal consultation and reevaluation. Please alert me if you have further questions.    Thank you for this eConsult referral.     Abhinav Mcfarland MD  [facility] CARDIOLOGY

## 2025-05-16 ENCOUNTER — TELEPHONE (OUTPATIENT)
Dept: UROLOGY | Facility: CLINIC | Age: 70
End: 2025-05-16
Payer: MEDICARE

## 2025-05-16 NOTE — TELEPHONE ENCOUNTER
Attempt to reach out to pt regarding his request for an earlier appt but was unsuccessful. LVM      ----- Message from Rosario sent at 5/16/2025 10:03 AM CDT -----  Contact: SEMAJ BURDEN [[id number]]  .Type:  Sooner Apoointment RequestCaller is requesting a sooner appointment.  Caller declined first available appointment listed below.  Caller will not accept being placed on the waitlist and is requesting a message be sent to doctor.Name of Caller: SEMAJ BURDEN [[id number]]When is the first available appointment? Sched 5/26Symptoms: Symptom: Urine SymptomsOutcome: Talk to a nurse or provider within 15 minutes.Reason: Can't pass urine (can't pee) -- pt a diabetic The caller rejected this outcome.Would the patient rather a call back or a response via [facility]?  [employer] Call Back Number: .[phone] Additional Information: need time after lunch on [facility] w/ Dr Maldonado preferably

## 2025-05-23 ENCOUNTER — OFFICE VISIT (OUTPATIENT)
Dept: URGENT CARE | Facility: CLINIC | Age: 70
End: 2025-05-23
Payer: MEDICARE

## 2025-05-23 VITALS
HEART RATE: 96 BPM | TEMPERATURE: 99 F | RESPIRATION RATE: 20 BRPM | WEIGHT: 226.63 LBS | OXYGEN SATURATION: 94 % | DIASTOLIC BLOOD PRESSURE: 59 MMHG | BODY MASS INDEX: 32.45 KG/M2 | HEIGHT: 70 IN | SYSTOLIC BLOOD PRESSURE: 125 MMHG

## 2025-05-23 DIAGNOSIS — H57.89 REDNESS OF LEFT EYE: ICD-10-CM

## 2025-05-23 DIAGNOSIS — H57.89 IRRITATION OF LEFT EYE: ICD-10-CM

## 2025-05-23 DIAGNOSIS — H00.014 HORDEOLUM EXTERNUM OF LEFT UPPER EYELID: Primary | ICD-10-CM

## 2025-05-23 RX ORDER — ERYTHROMYCIN 5 MG/G
OINTMENT OPHTHALMIC EVERY 4 HOURS
Qty: 3.5 G | Refills: 0 | Status: SHIPPED | OUTPATIENT
Start: 2025-05-23 | End: 2025-06-02

## 2025-05-23 NOTE — PROGRESS NOTES
"Subjective:      Patient ID: Chapo Jacobo is a 69 y.o. male.    Vitals:  height is 5' 10" (1.778 m) and weight is 102.8 kg (226 lb 10.1 oz). His tympanic temperature is 99 °F (37.2 °C). His blood pressure is 125/59 (abnormal) and his pulse is 96. His respiration is 20 and oxygen saturation is 94% (abnormal).     Chief Complaint: Eye Problem    69 year old male presents for evaluation of left eye redness and irritation x 3 days. Symptom onset Tuesday after cutting grass.  States that he did not wear his glasses while cutting his yard and later felt grit in his eye that night.  Reports rubbing his eyes and watery discharge since onset. No medications taken for relief.    Eye Problem   The left eye is affected. This is a new problem. The current episode started in the past 7 days (2). The problem has been gradually worsening. The pain is at a severity of 7/10. Associated symptoms include eye redness. Pertinent negatives include no blurred vision, eye discharge, double vision, itching or photophobia. He has tried nothing for the symptoms.       Constitution: Negative.   HENT: Negative.     Neck: neck negative.   Cardiovascular: Negative.    Eyes:  Positive for eye pain, eye redness and eyelid swelling. Negative for eye trauma, foreign body in eye, eye discharge, eye itching, photophobia, vision loss, double vision and blurred vision.   Respiratory: Negative.     Gastrointestinal: Negative.    Endocrine: negative.   Genitourinary: Negative.    Musculoskeletal: Negative.    Skin: Negative.    Allergic/Immunologic: Negative.    Neurological: Negative.    Hematologic/Lymphatic: Negative.    Psychiatric/Behavioral: Negative.        Objective:     Physical Exam   Constitutional: He is oriented to person, place, and time. He is cooperative.  Non-toxic appearance. He does not appear ill. No distress. normalawake  HENT:   Head: Normocephalic and atraumatic.   Eyes: Conjunctivae are normal. Pupils are equal, round, and " reactive to light. Right eye exhibits no chemosis, no discharge, no exudate and no hordeolum. No foreign body present in the right eye. Left eye exhibits hordeolum (left upper eyelid). Left eye exhibits no chemosis, no discharge and no exudate. No foreign body present in the left eye. Right conjunctiva is not injected. Right conjunctiva has no hemorrhage. Left conjunctiva is not injected. Left conjunctiva has no hemorrhage. No scleral icterus. Right eye exhibits normal extraocular motion and no nystagmus. Left eye exhibits normal extraocular motion and no nystagmus. Right pupil is round, reactive and not sluggish. Left pupil is round, reactive and not sluggish. Pupils are equal.     Extraocular movement intact   Neck: Neck supple.   Cardiovascular: Normal rate.   Pulmonary/Chest: Effort normal.   Abdominal: Normal appearance.   Musculoskeletal: Normal range of motion.         General: Normal range of motion.   Neurological: no focal deficit. He is alert and oriented to person, place, and time.   Skin: Skin is warm, dry and not diaphoretic.   Psychiatric: His behavior is normal. Mood, judgment and thought content normal.   Nursing note and vitals reviewed.      Assessment:     1. Hordeolum externum of left upper eyelid    2. Redness of left eye    3. Irritation of left eye        Plan:       Hordeolum externum of left upper eyelid  -     erythromycin (ROMYCIN) ophthalmic ointment; Place into the left eye every 4 (four) hours. for 10 days  Dispense: 3.5 g; Refill: 0    Redness of left eye    Irritation of left eye        Patient presents for evaluation of left eye pain. Exam reveals left upper lid stye/pustule. Plan is to treat/prevent bacterial infection, manage symptoms, and follow up as needed/with worsening. Discussed with patient who verbalizes understanding. Patient is stable for discharge.     Patient Instructions   Hand hygiene  Wash hands with antibacterial soap and water  Apply warm towels to left eye 5-10  minutes  Apply Erythromycin ointment to left eye as directed  Typical course and duration of illness discussed  Signs and symptoms of worsening discussed  Follow up as needed/with worsening

## 2025-05-23 NOTE — PATIENT INSTRUCTIONS
Hand hygiene  Wash hands with antibacterial soap and water  Apply warm towels to left eye 5-10 minutes  Apply Erythromycin ointment to left eye as directed  Typical course and duration of illness discussed  Signs and symptoms of worsening discussed  Follow up as needed/with worsening

## 2025-05-30 ENCOUNTER — TELEPHONE (OUTPATIENT)
Dept: PREADMISSION TESTING | Facility: HOSPITAL | Age: 70
End: 2025-05-30
Payer: MEDICARE

## 2025-06-06 ENCOUNTER — TELEPHONE (OUTPATIENT)
Dept: PODIATRY | Facility: CLINIC | Age: 70
End: 2025-06-06
Payer: MEDICARE

## 2025-06-09 ENCOUNTER — LAB VISIT (OUTPATIENT)
Dept: LAB | Facility: HOSPITAL | Age: 70
End: 2025-06-09
Attending: UROLOGY
Payer: MEDICARE

## 2025-06-09 ENCOUNTER — OFFICE VISIT (OUTPATIENT)
Dept: UROLOGY | Facility: CLINIC | Age: 70
End: 2025-06-09
Payer: MEDICARE

## 2025-06-09 ENCOUNTER — OFFICE VISIT (OUTPATIENT)
Dept: PODIATRY | Facility: CLINIC | Age: 70
End: 2025-06-09
Payer: MEDICARE

## 2025-06-09 VITALS
WEIGHT: 226.63 LBS | SYSTOLIC BLOOD PRESSURE: 101 MMHG | HEART RATE: 80 BPM | DIASTOLIC BLOOD PRESSURE: 68 MMHG | BODY MASS INDEX: 32.45 KG/M2 | HEIGHT: 70 IN

## 2025-06-09 DIAGNOSIS — R39.11 URINARY HESITANCY: ICD-10-CM

## 2025-06-09 DIAGNOSIS — L60.3 ONYCHODYSTROPHY: ICD-10-CM

## 2025-06-09 DIAGNOSIS — R35.1 BPH ASSOCIATED WITH NOCTURIA: ICD-10-CM

## 2025-06-09 DIAGNOSIS — D84.9 IMMUNOSUPPRESSION: ICD-10-CM

## 2025-06-09 DIAGNOSIS — R35.0 URINARY FREQUENCY: ICD-10-CM

## 2025-06-09 DIAGNOSIS — E11.65 UNCONTROLLED TYPE 2 DIABETES MELLITUS WITH HYPERGLYCEMIA: Primary | ICD-10-CM

## 2025-06-09 DIAGNOSIS — R35.0 URINARY FREQUENCY: Primary | ICD-10-CM

## 2025-06-09 DIAGNOSIS — E11.49 TYPE 2 DIABETES MELLITUS WITH OTHER NEUROLOGIC COMPLICATION, WITHOUT LONG-TERM CURRENT USE OF INSULIN: ICD-10-CM

## 2025-06-09 DIAGNOSIS — N40.1 BPH ASSOCIATED WITH NOCTURIA: ICD-10-CM

## 2025-06-09 DIAGNOSIS — Z94.4 LIVER REPLACED BY TRANSPLANT: ICD-10-CM

## 2025-06-09 PROCEDURE — 36415 COLL VENOUS BLD VENIPUNCTURE: CPT

## 2025-06-09 PROCEDURE — 99999 PR PBB SHADOW E&M-EST. PATIENT-LVL II: CPT | Mod: PBBFAC,,, | Performed by: PODIATRIST

## 2025-06-09 PROCEDURE — 99204 OFFICE O/P NEW MOD 45 MIN: CPT | Mod: S$GLB,,, | Performed by: UROLOGY

## 2025-06-09 PROCEDURE — 99999 PR PBB SHADOW E&M-EST. PATIENT-LVL III: CPT | Mod: PBBFAC,,, | Performed by: UROLOGY

## 2025-06-09 PROCEDURE — 3008F BODY MASS INDEX DOCD: CPT | Mod: CPTII,S$GLB,, | Performed by: UROLOGY

## 2025-06-09 PROCEDURE — 3074F SYST BP LT 130 MM HG: CPT | Mod: CPTII,S$GLB,, | Performed by: UROLOGY

## 2025-06-09 PROCEDURE — 84153 ASSAY OF PSA TOTAL: CPT

## 2025-06-09 PROCEDURE — 3078F DIAST BP <80 MM HG: CPT | Mod: CPTII,S$GLB,, | Performed by: UROLOGY

## 2025-06-09 PROCEDURE — 1126F AMNT PAIN NOTED NONE PRSNT: CPT | Mod: CPTII,S$GLB,, | Performed by: UROLOGY

## 2025-06-09 RX ORDER — TAMSULOSIN HYDROCHLORIDE 0.4 MG/1
0.4 CAPSULE ORAL DAILY
Qty: 30 CAPSULE | Refills: 5 | Status: SHIPPED | OUTPATIENT
Start: 2025-06-09 | End: 2025-06-09 | Stop reason: SDUPTHER

## 2025-06-09 RX ORDER — TAMSULOSIN HYDROCHLORIDE 0.4 MG/1
0.8 CAPSULE ORAL DAILY
Qty: 60 CAPSULE | Refills: 5 | Status: SHIPPED | OUTPATIENT
Start: 2025-06-09 | End: 2025-12-06

## 2025-06-09 NOTE — PROGRESS NOTES
Subjective:       Patient ID: Chapo Jacobo is a 69 y.o. male.    Chief Complaint: Nail Care (Nail Care: c/o denies pain at present, pt is diabetic, pt was last seen on 4.11.25 by Silvestre Carrasco )    HPI: Patient presents to the office today with the chief complaint of elongated, thickened and dystrophic nail plates to the B/L foot.  This patient is a Diabetic Type II, complicated with Peripheral Neuropathy and history of liver transplant on long-term immunosuppressive medication.  Currently following with transplant team and Dr. Hope . Patient does follow with Primary Care and/or Endocrinology for management of Diabetes Mellitus. This patient's PMD is Silvestre Carrasco MD. This patient last saw his/her primary care provider on 4/11/2025 with Silvestre Carrasco MD     Hemoglobin A1C   Date Value Ref Range Status   01/18/2024 10.5 (H) 4.0 - 5.6 % Final     Comment:     ADA Screening Guidelines:  5.7-6.4%  Consistent with prediabetes  >or=6.5%  Consistent with diabetes    High levels of fetal hemoglobin interfere with the HbA1C  assay. Heterozygous hemoglobin variants (HbS, HgC, etc)do  not significantly interfere with this assay.   However, presence of multiple variants may affect accuracy.     11/01/2016 5.7 4.5 - 6.2 % Final     Comment:     According to ADA guidelines, hemoglobin A1C <7.0% represents  optimal control in non-pregnant diabetic patients.  Different  metrics may apply to specific populations.   Standards of Medical Care in Diabetes - 2016.  For the purpose of screening for the presence of diabetes:  <5.7%     Consistent with the absence of diabetes  5.7-6.4%  Consistent with increasing risk for diabetes   (prediabetes)  >or=6.5%  Consistent with diabetes  Currently no consensus exists for use of hemoglobin A1C  for diagnosis of diabetes for children.     02/16/2006 6.3 (H) 4.5 - 6.2 % Final   .     Review of patient's allergies indicates:   Allergen Reactions    Darvocet a500 [propoxyphene  n-acetaminophen]     Paroxetine hcl Other (See Comments)     Other reaction(s): Unknown  Other reaction(s): Unknown  paranoid    Penicillins Other (See Comments)     Other reaction(s): Anaphylaxis  Other reaction(s): Anaphylaxis  As child    Codeine Itching     Other reaction(s): Unknown  Other reaction(s): Unknown       Past Medical History:   Diagnosis Date    Anxiety     Appendicitis     Coronary artery disease of native artery of native heart with stable angina pectoris 01/19/2024    Depression     Diabetes mellitus     Diastolic dysfunction 01/19/2024    Encounter for blood transfusion     Gallbladder & bile duct stone with obstruction     Herpes     PAD (peripheral artery disease) 07/07/2023    Sleep apnea        Family History   Problem Relation Name Age of Onset    Arthritis Mother      Melanoma Father      Cancer Father          melanoma    Fibromyalgia Sister      Cancer Maternal Grandfather          unknown type       Social History     Socioeconomic History    Marital status: Single   Tobacco Use    Smoking status: Every Day     Current packs/day: 1.00     Average packs/day: 1 pack/day for 46.0 years (46.0 ttl pk-yrs)     Types: Cigarettes     Passive exposure: Past    Smokeless tobacco: Never   Substance and Sexual Activity    Alcohol use: Not Currently     Alcohol/week: 2.0 standard drinks of alcohol     Types: 2 Cans of beer per week    Drug use: Yes     Types: Marijuana     Comment: Smoke Marijuana occasionally    Sexual activity: Yes     Partners: Female     Social Drivers of Health     Financial Resource Strain: High Risk (3/28/2025)    Received from [facility] SDOH Screening     In the past year, have you been unable to get any of the following when you really needed them? choose all that apply.: Clothing     In the past year, have you been unable to get any of the following when you really needed them? choose all that apply.: Internet       Past Surgical History:   Procedure  Laterality Date    ABDOMINAL SURGERY      APPENDECTOMY      bilateral rotator cuff surgery      BRAIN SURGERY      CERVICAL FUSION      CHOLECYSTECTOMY      COLONOSCOPY N/A 7/10/2017    Procedure: COLONOSCOPY;  Surgeon: Viviana Reyes MD;  Location: [facility];  Service: Endoscopy;  Laterality: N/A;    LIVER TRANSPLANT      LUMBAR DISC SURGERY      SELECTIVE INJECTION OF ANESTHETIC AGENT AROUND LUMBAR SPINAL NERVE ROOT BY TRANSFORAMINAL APPROACH Bilateral 9/18/2023    Procedure: Bilateral L4/5 TF JULIUS;  Surgeon: Bill Read MD;  Location: [facility];  Service: Pain Management;  Laterality: Bilateral;    TONSILLECTOMY         Review of Systems       Objective:   There were no vitals taken for this visit.    Physical Exam  LOWER EXTREMITY PHYSICAL EXAMINATION    VASCULAR:  The right dorsalis pedis pulse 2/4 and the right posterior tibial pulse 2/4.  The left dorsalis pedis pulse 2/4 and posterior tibial pulse on the left is 2/4.  Capillary refill is intact.  Pedal hair growth intact    NEUROLOGY: Protective sensation is not intact to the left and right plantar surfaces of the foot and digits, as the patient has no sensation/detection at greater than 4 distinct points of contact with 5.07 [place] monofilament. Sensation to light touch is intact on the left and right foot. Proprioception is intact, bilateral. Sensation to pin prick is reduced to absent. Vibratory sensation is diminished.    DERMATOLOGY:  Skin is supple, moist, intact.  There is no signs of callusing, ulcerations, other lesions identified to the dorsal or plantar aspect of the right or left foot.  The R1, 2, 5 and left L1,2, 5 are thickened, discolored dystrophic.  There is subungual debris.  Nail plates have area of dark discoloration.  The remaining nails 3-4 on the right foot and the left foot are elongated but of normal color, thickness, and texture.   There is no signs of ingrowing into the medial or lateral borders.  There is no  evidence of wounds or skin breakdown.  No edema or erythema.  No obvious lacerations or fissuring.  Interdigital spaces are clean, dry, intact.  No rashes or scars appreciated.    ORTHOPEDIC: Manual Muscle Testing is 5/5 in all planes on the left and right, without pains, with and without resistance. Gait pattern is non-antalgic.    Assessment:     1. Uncontrolled type 2 diabetes mellitus with hyperglycemia    2. Type 2 diabetes mellitus with other neurologic complication, without long-term current use of insulin    3. Immunosuppression    4. Liver replaced by transplant    5. Onychodystrophy        Plan:     Uncontrolled type 2 diabetes mellitus with hyperglycemia    Type 2 diabetes mellitus with other neurologic complication, without long-term current use of insulin    Immunosuppression    Liver replaced by transplant    Onychodystrophy        Thorough discussion is had with the patient this afternoon, concerning the diagnosis, its etiology, and the treatment algorithm at present.  Greater than 50% of this visit spent on counseling and coordination of care. Greater than 15 minutes of a 20 minute appointment spent on education about the diabetic foot, neuropathy, and prevention of limb loss.  Shoe inspection. Diabetic Foot Education. Patient reminded of the importance of good nutrition and blood sugar control to help prevent podiatric complications of diabetes. Patient instructed on proper foot hygeine. We discussed wearing proper and supportive shoe gear, daily foot inspections, never walking barefooted or sock footed, never putting sharp instruments to feet which can cause major complications associated with infection, ulcers, lacerations.      Dystrophic nail plates, as outlined above (R#1,2,5  ; L#1,2,5 ), are sharply debrided with double action nail nipper, and/or with the assistance of a mechanical rotary jyoti, with removal of all offending nail and nail border(s), for reduction of pains. Nails are reduced in  terms of length, width and girth with removal of subungual debris to facilitate pain free weight bearing and ambulation. The elongated nails as outlined in the objective portion of this note, were trimmed to appropriate length, with a double action nail nipper, for alleviation/reduction of pains as well. Follow up in approx. 3-4 months.    Continue management for long-term immunosuppressive therapy per transplant team.    Defer diabetic pain medication to transplant team      Future Appointments   Date Time Provider Department Center   6/9/2025  3:30 PM Pelon Maldonado MD ON UROLOGY [facility]   6/18/2025 12:00 PM [facility] ENDO 04 [facility] ENDO Center [place]   7/16/2025  1:45 PM Hope Park MD [facility] DERM [facility]   7/16/2025  2:20 PM Scott Moncada MD [facility] CARDIO [facility]   7/21/2025  8:00 AM LABORATORY, CENTRAL [facility] LAB [place]   10/20/2025  1:15 PM Whit Redd DPM ON POD [facility]

## 2025-06-09 NOTE — PROGRESS NOTES
Chief Complaint:   Encounter Diagnoses   Name Primary?    Urinary frequency Yes    BPH associated with nocturia     Urinary hesitancy        HPI:  HPI Chapo Jacobo is a 69 y.o. male who presents with history of BPH urinary hesitancy and urinary frequency.  Patient has trouble getting started in the morning.  He has been on tamsulosin for many years.  He has also been on Xanax for many years.  He has uncontrolled diabetes with his last A1c being greater than 10.  He is also on Jardiance.  He is not compliant with his diet.    History:  Social History[1]  Past Medical History:   Diagnosis Date    Anxiety     Appendicitis     Coronary artery disease of native artery of native heart with stable angina pectoris 01/19/2024    Depression     Diabetes mellitus     Diastolic dysfunction 01/19/2024    Encounter for blood transfusion     Gallbladder & bile duct stone with obstruction     Herpes     PAD (peripheral artery disease) 07/07/2023    Sleep apnea      Past Surgical History:   Procedure Laterality Date    ABDOMINAL SURGERY      APPENDECTOMY      bilateral rotator cuff surgery      BRAIN SURGERY      CERVICAL FUSION      CHOLECYSTECTOMY      COLONOSCOPY N/A 7/10/2017    Procedure: COLONOSCOPY;  Surgeon: Viviana Reyes MD;  Location: [facility];  Service: Endoscopy;  Laterality: N/A;    LIVER TRANSPLANT      LUMBAR DISC SURGERY      SELECTIVE INJECTION OF ANESTHETIC AGENT AROUND LUMBAR SPINAL NERVE ROOT BY TRANSFORAMINAL APPROACH Bilateral 9/18/2023    Procedure: Bilateral L4/5 TF JULIUS;  Surgeon: Bill Read MD;  [place] [facility] [place] [facility] [place]  Service: [place] Management;  Laterality: Bilateral;    TONSILLECTOMY       Family History   Problem Relation Name Age of Onset    Arthritis Mother      Melanoma Father      Cancer Father          melanoma    Fibromyalgia Sister      Cancer Maternal Grandfather          unknown type       Medications Ordered Prior to Encounter[2]     Objective:     Vitals:    06/09/25 1516   BP:  "101/68   BP Location: Right arm   Patient Position: Sitting   Pulse: 80   Weight: 102.8 kg (226 lb 10.1 oz)   Height: 5' 10" (1.778 m)      BMI Readings from Last 1 Encounters:   06/09/25 32.52 kg/m²          Physical Exam  No acute distress alert and oriented   Respirations even unlabored   Abdomen is obese   Digital rectal exam reveals a 30 g smooth prostate  Lab Results   Component Value Date    PSA 0.58 11/01/2016    PSA 0.7 03/30/2005        Lab Results   Component Value Date    CREATININE 1.1 04/22/2025      Assessment:       1. Urinary frequency    2. BPH associated with nocturia    3. Urinary hesitancy        Plan:     1. Urinary frequency    2. BPH associated with nocturia    3. Urinary hesitancy       Orders Placed This Encounter    Prostate Specific Antigen, Diagnostic    tamsulosin (FLOMAX) 0.4 mg Cap      Urinary frequency likely due to uncontrolled diabetes and Jardiance.  Patient is already on tamsulosin.  His prostate is not enlarged.  We will check a PSA.  Urinary hesitancy maybe do from Xanax.  We will double up on his Flomax to try to improve both of these symptoms that are bothersome to him.  I recommend a PSA.  I will see him back in a couple of months.         [1]   Social History  Tobacco Use    Smoking status: Every Day     Current packs/day: 1.00     Average packs/day: 1 pack/day for 46.0 years (46.0 ttl pk-yrs)     Types: Cigarettes     Passive exposure: Past    Smokeless tobacco: Never   Substance Use Topics    Alcohol use: Not Currently     Alcohol/week: 2.0 standard drinks of alcohol     Types: 2 Cans of beer per week    Drug use: Yes     Types: Marijuana     Comment: Smoke Marijuana occasionally   [2]   Current Outpatient Medications on File Prior to Visit   Medication Sig Dispense Refill    alprazolam (XANAX) 1 MG tablet Take 1 mg by mouth 2 (two) times daily. Patient takes 1 tablet twice daily  1    aspirin (ECOTRIN) 81 MG EC tablet Take 1 tablet (81 mg total) by mouth once daily. " (Patient not taking: Reported on 4/29/2025) 30 tablet 12    clobetasol 0.05% (TEMOVATE) 0.05 % Oint Apply topically 2 (two) times daily. Use on knees 60 g 2    dextroamphetamine-amphetamine 30 mg Tab Take 1 tablet by mouth as needed.      FLUoxetine 20 MG capsule Take 4 capsules by mouth Daily.       gabapentin (NEURONTIN) 100 MG capsule Take 100 mg by mouth 3 (three) times daily.      glimepiride (AMARYL) 4 MG tablet Take 1 tablet by mouth as needed.       hydrocortisone 2.5 % cream   0    hydrocortisone 2.5 % cream Apply topically 2 (two) times daily. Mix with ketoconazole cream and AAA on beard BID for up to 2 weeks at a time 30 g 1    isosorbide mononitrate (IMDUR) 60 MG 24 hr tablet Take 1 tablet (60 mg total) by mouth once daily. 90 tablet 3    JARDIANCE 25 mg tablet Take 25 mg by mouth.      ketoconazole (NIZORAL) 2 % cream Apply topically 2 (two) times daily. Mix with hydrocortisone cream and AAA on beard BID for up to 2 weeks at a time 30 g 1    levocetirizine (XYZAL) 5 MG tablet Take 5 mg by mouth every evening.      levothyroxine (SYNTHROID) 25 MCG tablet Take 25 mcg by mouth every morning.      losartan (COZAAR) 50 MG tablet Take 50 mg by mouth once daily.      metformin (GLUCOPHAGE) 1000 MG tablet Take 1 tablet by mouth Daily.      metoprolol tartrate (LOPRESSOR) 50 MG tablet Take one tablet at 8PM the evening before and one tablet at 7AM the morning before  CTA 2 tablet 0    mupirocin (BACTROBAN) 2 % ointment       nitroGLYCERIN (NITROSTAT) 0.4 MG SL tablet Place 1 tablet (0.4 mg total) under the tongue every 5 (five) minutes as needed for Chest pain. 30 tablet 12    omega-3 acid ethyl esters (LOVAZA) 1 gram capsule Take 1 capsule by mouth 3 (three) times daily.      pioglitazone (ACTOS) 15 MG tablet Take 15 mg by mouth.      rosuvastatin (CRESTOR) 5 MG tablet Take 5 mg by mouth.      tacrolimus (PROGRAF) 1 MG Cap Take 2 capsules (2 mg total) by mouth every morning AND 1 capsule (1 mg total) every  evening. 270 capsule 3    triamcinolone acetonide 0.1% (KENALOG) 0.1 % Lotn       valacyclovir (VALTREX) 500 MG tablet Take 500 mg by mouth once daily.       [DISCONTINUED] amitriptyline (ELAVIL) 25 MG tablet TAKE 1 TABLET BY MOUTH EVERY EVENING 30 tablet 11    [DISCONTINUED] cyclobenzaprine (FLEXERIL) 10 MG tablet Take 0.5 tablets (5 mg total) by mouth 3 (three) times daily as needed for Muscle spasms. 15 tablet 0    [DISCONTINUED] tamsulosin (FLOMAX) 0.4 mg Cap Take 1 capsule (0.4 mg total) by mouth once daily. 30 capsule 0     No current facility-administered medications on file prior to visit.

## 2025-06-10 LAB — PSA SERPL-MCNC: 0.88 NG/ML

## 2025-06-16 DIAGNOSIS — G47.9 SLEEP DISTURBANCE: Primary | ICD-10-CM

## 2025-07-03 ENCOUNTER — TELEPHONE (OUTPATIENT)
Dept: UROLOGY | Facility: CLINIC | Age: 70
End: 2025-07-03
Payer: MEDICARE

## 2025-07-09 ENCOUNTER — TELEPHONE (OUTPATIENT)
Dept: UROLOGY | Facility: CLINIC | Age: 70
End: 2025-07-09
Payer: MEDICARE

## 2025-07-09 NOTE — TELEPHONE ENCOUNTER
Called patient in regards to message and he stated that he has had one before. Patient asked if he would be sedated, I let him know that he would not. I advised that we only do numbing jelly in office but this procedure only takes about 30 seconds. Patient expressed understanding, all questions answered at this time.          Copied from CRM #[id number]. Topic: General Inquiry - Patient Advice  >> Jul 9, 2025  3:40 PM Betsey wrote:  .Type:  Needs Medical Advice    Who Called: .Chapo Jacobo  Would the patient rather a call back or a response via [facility]? Call back  Best Call Back Number: .[phone]  Additional Information: pt is requesting a call back to discuss a cystocope

## 2025-07-11 ENCOUNTER — TELEPHONE (OUTPATIENT)
Dept: UROLOGY | Facility: CLINIC | Age: 70
End: 2025-07-11
Payer: MEDICARE

## 2025-07-11 NOTE — TELEPHONE ENCOUNTER
Copied from CRM #[id number]. Topic: Appointments - Appointment Access  >> Jul 11, 2025  2:47 PM Pham wrote:  Pt is calling in regards to frequent urination and wanting to know if a cysto can be done at appt.please call pt back at .[phone]

## 2025-07-11 NOTE — TELEPHONE ENCOUNTER
Spoke with patient who was able to provide acceptable patient identifiers prior to start of conversation. Patient scheduled for follow up on 08/04/2025 2:45PM [place] location due to nocturia x 6. Patient would like to conduct possible cystoscopy.

## 2025-07-14 ENCOUNTER — HOSPITAL ENCOUNTER (EMERGENCY)
Facility: HOSPITAL | Age: 70
Discharge: HOME | End: 2025-07-14
Attending: EMERGENCY MEDICINE
Payer: MEDICARE

## 2025-07-14 ENCOUNTER — TELEPHONE (OUTPATIENT)
Dept: UROLOGY | Facility: CLINIC | Age: 70
End: 2025-07-14
Payer: MEDICARE

## 2025-07-14 VITALS
HEART RATE: 84 BPM | BODY MASS INDEX: 31.97 KG/M2 | SYSTOLIC BLOOD PRESSURE: 103 MMHG | DIASTOLIC BLOOD PRESSURE: 58 MMHG | RESPIRATION RATE: 16 BRPM | WEIGHT: 222.81 LBS | OXYGEN SATURATION: 95 % | TEMPERATURE: 98 F

## 2025-07-14 DIAGNOSIS — Z87.891 HISTORY OF TOBACCO ABUSE: ICD-10-CM

## 2025-07-14 DIAGNOSIS — Z94.4 HISTORY OF LIVER TRANSPLANT: ICD-10-CM

## 2025-07-14 DIAGNOSIS — R33.8 ACUTE URINARY RETENTION: Primary | ICD-10-CM

## 2025-07-14 LAB
ABSOLUTE EOSINOPHIL (OHS): 0.11 K/UL
ABSOLUTE MONOCYTE (OHS): 0.91 K/UL (ref 0.3–1)
ABSOLUTE NEUTROPHIL COUNT (OHS): 5.03 K/UL (ref 1.8–7.7)
ALBUMIN SERPL BCP-MCNC: 3.9 G/DL (ref 3.5–5.2)
ALP SERPL-CCNC: 74 UNIT/L (ref 40–150)
ALT SERPL W/O P-5'-P-CCNC: 14 UNIT/L (ref 10–44)
ANION GAP (OHS): 8 MMOL/L (ref 8–16)
AST SERPL-CCNC: 22 UNIT/L (ref 11–45)
BACTERIA #/AREA URNS AUTO: NORMAL /HPF
BASOPHILS # BLD AUTO: 0.11 K/UL
BASOPHILS NFR BLD AUTO: 1.3 %
BILIRUB SERPL-MCNC: 0.9 MG/DL (ref 0.1–1)
BILIRUB UR QL STRIP.AUTO: NEGATIVE
BUN SERPL-MCNC: 18 MG/DL (ref 8–23)
CALCIUM SERPL-MCNC: 8.8 MG/DL (ref 8.7–10.5)
CHLORIDE SERPL-SCNC: 104 MMOL/L (ref 95–110)
CLARITY UR: CLEAR
CO2 SERPL-SCNC: 20 MMOL/L (ref 23–29)
COLOR UR AUTO: YELLOW
CREAT SERPL-MCNC: 1.1 MG/DL (ref 0.5–1.4)
ERYTHROCYTE [DISTWIDTH] IN BLOOD BY AUTOMATED COUNT: 13 % (ref 11.5–14.5)
GFR SERPLBLD CREATININE-BSD FMLA CKD-EPI: >60 ML/MIN/1.73/M2
GLUCOSE SERPL-MCNC: 178 MG/DL (ref 70–110)
GLUCOSE UR QL STRIP: ABNORMAL
HCT VFR BLD AUTO: 51.5 % (ref 40–54)
HGB BLD-MCNC: 17.9 GM/DL (ref 14–18)
HGB UR QL STRIP: NEGATIVE
HYALINE CASTS UR QL AUTO: 0 /LPF (ref 0–1)
IMM GRANULOCYTES # BLD AUTO: 0.03 K/UL (ref 0–0.04)
IMM GRANULOCYTES NFR BLD AUTO: 0.4 % (ref 0–0.5)
KETONES UR QL STRIP: NEGATIVE
LEUKOCYTE ESTERASE UR QL STRIP: NEGATIVE
LYMPHOCYTES # BLD AUTO: 2.23 K/UL (ref 1–4.8)
MCH RBC QN AUTO: 31.2 PG (ref 27–31)
MCHC RBC AUTO-ENTMCNC: 34.8 G/DL (ref 32–36)
MCV RBC AUTO: 90 FL (ref 82–98)
MICROSCOPIC COMMENT: NORMAL
NITRITE UR QL STRIP: NEGATIVE
NUCLEATED RBC (/100WBC) (OHS): 0 /100 WBC
PH UR STRIP: 6 [PH]
PLATELET # BLD AUTO: 219 K/UL (ref 150–450)
PMV BLD AUTO: 10 FL (ref 9.2–12.9)
POCT GLUCOSE: 187 MG/DL (ref 70–110)
POTASSIUM SERPL-SCNC: 4.5 MMOL/L (ref 3.5–5.1)
PROT SERPL-MCNC: 7.7 GM/DL (ref 6–8.4)
PROT UR QL STRIP: NEGATIVE
RBC # BLD AUTO: 5.73 M/UL (ref 4.6–6.2)
RBC #/AREA URNS AUTO: 0 /HPF (ref 0–4)
RELATIVE EOSINOPHIL (OHS): 1.3 %
RELATIVE LYMPHOCYTE (OHS): 26.5 % (ref 18–48)
RELATIVE MONOCYTE (OHS): 10.8 % (ref 4–15)
RELATIVE NEUTROPHIL (OHS): 59.7 % (ref 38–73)
SODIUM SERPL-SCNC: 132 MMOL/L (ref 136–145)
SP GR UR STRIP: 1.03
UROBILINOGEN UR STRIP-ACNC: NEGATIVE EU/DL
WBC # BLD AUTO: 8.42 K/UL (ref 3.9–12.7)
WBC #/AREA URNS AUTO: 0 /HPF (ref 0–5)
YEAST UR QL AUTO: NORMAL /HPF

## 2025-07-14 PROCEDURE — 82962 GLUCOSE BLOOD TEST: CPT

## 2025-07-14 PROCEDURE — 85025 COMPLETE CBC W/AUTO DIFF WBC: CPT | Performed by: EMERGENCY MEDICINE

## 2025-07-14 PROCEDURE — 80053 COMPREHEN METABOLIC PANEL: CPT | Performed by: EMERGENCY MEDICINE

## 2025-07-14 PROCEDURE — 99283 EMERGENCY DEPT VISIT LOW MDM: CPT

## 2025-07-14 PROCEDURE — 81003 URINALYSIS AUTO W/O SCOPE: CPT | Performed by: EMERGENCY MEDICINE

## 2025-07-14 PROCEDURE — 51702 INSERT TEMP BLADDER CATH: CPT

## 2025-07-14 NOTE — TELEPHONE ENCOUNTER
.Outgoing call placed to patient, patient verified identifiers, patient stated he has not urinated today, thanked patient for calling in and offered him an appointment to see the NP on today or advised to go to the ED for evaluation and treatment. Patient asked what the NP could do and notified what the typical clinical process involves and he stated he has an appt with Dr. Maldonado on Friday and he thinks he should go to the ED today to get checked out and then he will follow up with Dr. Maldonado on Friday. Encouraged to go to ED on today for evaluation and treatment as soon as possible, he verbalized understanding and no further assistance needed.     Copied from CRM #[id number]. Topic: Appointments - Appointment Access  >> Jul 14, 2025  2:26 PM Bobby wrote:  Type:  Same Day Appointment Request    Caller is requesting a same day appointment.  Caller declined first available appointment listed below.    Name of Caller:Chapo Jacobo  When is the first available appointment?7/18/25  Symptoms:he is stopped up and hasn't peed today  Best Call Back Number:[phone]  Additional Information: the patient was wanting to see if he can be seen today.

## 2025-07-14 NOTE — ED PROVIDER NOTES
SCRIBE #1 NOTE: I, Sienna Ivan, am scribing for, and in the presence of, Almaz Kay MD. I have scribed the entire note.       History     Chief Complaint   Patient presents with    Urinary Retention     Unable to urinate since 3:30 am today. Denies any pain. Drinking water and fluids, but doesn't feel like urinating.      Review of patient's allergies indicates:   Allergen Reactions    Darvocet a500 [propoxyphene n-acetaminophen]     Paroxetine hcl Other (See Comments)     Other reaction(s): Unknown  Other reaction(s): Unknown  paranoid    Penicillins Other (See Comments)     Other reaction(s): Anaphylaxis  Other reaction(s): Anaphylaxis  As child    Codeine Itching     Other reaction(s): Unknown  Other reaction(s): Unknown         History of Present Illness     HPI    7/14/2025, 6:15 PM  History obtained from the patient      History of Present Illness: Chapo Jacobo is a 69 y.o. male patient with a PMHx of DM, PAD, and anxiety who presents to the Emergency Department for evaluation of urinary retention which began earlier this morning. Patient states he woke up this morning  and was unable to urinate. Symptoms are constant and moderate in severity. No mitigating or exacerbating factors reported. No associated sxs included. Patient denies any fever, chills, nausea, vomiting, discomfort, or urge to urinate. No prior Tx included. Patient states he did get a new urologist and was told to take double the amount of his Flomax, which has since made him urinate more than normal. He says he does smoke. No further complaints or concerns at this time.       Arrival mode: Personal Transportation    PCP: Silvestre Carrasco MD        Past Medical History:  Past Medical History:   Diagnosis Date    Anxiety     Appendicitis     Coronary artery disease of native artery of native heart with stable angina pectoris 01/19/2024    Depression     Diabetes mellitus     Diastolic dysfunction 01/19/2024    Encounter for blood  transfusion     Gallbladder & bile duct stone with obstruction     Herpes     PAD (peripheral artery disease) 07/07/2023    Sleep apnea        Past Surgical History:  Past Surgical History:   Procedure Laterality Date    ABDOMINAL SURGERY      APPENDECTOMY      bilateral rotator cuff surgery      BRAIN SURGERY      CERVICAL FUSION      CHOLECYSTECTOMY      COLONOSCOPY N/A 7/10/2017    Procedure: COLONOSCOPY;  Surgeon: Viviana Reyes MD;  Location: [facility] END[facility];  Service: Endoscopy;  Laterality: N/A;    LIVER TRANSPLANT      LUMBAR DISC SURGERY      SELECTIVE INJECTION OF ANESTHETIC AGENT AROUND LUMBAR SPINAL NERVE ROOT BY TRANSFORAMINAL APPROACH Bilateral 9/18/2023    Procedure: Bilateral L4/5 TF JULIUS;  Surgeon: Bill Read MD;  Location: [facility]IN [facility];  Service: Pain Management;  Laterality: Bilateral;    TONSILLECTOMY           Family History:  Family History   Problem Relation Name Age of Onset    Arthritis Mother      Melanoma Father      Cancer Father          melanoma    Fibromyalgia Sister      Cancer Maternal Grandfather          unknown type       Social History:  Social History     Tobacco Use    Smoking status: Every Day     Current packs/day: 1.00     Average packs/day: 1 pack/day for 46.0 years (46.0 ttl pk-yrs)     Types: Cigarettes     Passive exposure: Past    Smokeless tobacco: Never   Substance and Sexual Activity    Alcohol use: Not Currently     Alcohol/week: 2.0 standard drinks of alcohol     Types: 2 Cans of beer per week    Drug use: Yes     Types: Marijuana     Comment: Smoke Marijuana occasionally    Sexual activity: Yes     Partners: Female        Review of Systems     Review of Systems   Constitutional:  Negative for chills and fever.   HENT:  Negative for sore throat.    Respiratory:  Negative for shortness of breath.    Cardiovascular:  Positive for chest pain (cramping).   Gastrointestinal:  Negative for diarrhea, nausea and vomiting.   Genitourinary:  Positive for difficulty urinating  (urinary retention). Negative for dysuria and urgency.        (-) discomfort   Musculoskeletal:  Negative for back pain.   Skin:  Negative for rash.   Neurological:  Negative for weakness.   Hematological:  Does not bruise/bleed easily.   All other systems reviewed and are negative.     Physical Exam     Initial Vitals [07/14/25 1737]   BP Pulse Resp Temp SpO2   (!) 98/58 82 20 98.1 °F (36.7 °C) 95 %      MAP       --          Physical Exam  Nursing Notes and Vital Signs Reviewed.  Constitutional: Patient is in no acute distress. Morbidly obese.  Head: Atraumatic. Normocephalic.  Eyes: PERRL. EOM intact. Conjunctivae are not pale. No scleral icterus.  ENT: Mucous membranes are moist. Oropharynx is clear and symmetric.    Neck: Supple. Full ROM. No lymphadenopathy.  Cardiovascular: Regular rate. Regular rhythm. No murmurs, rubs, or gallops. Distal pulses are 2+ and symmetric.  Pulmonary/Chest: No respiratory distress. Clear to auscultation bilaterally. No wheezing or rales.  Abdominal: Soft and non-distended.  There is suprapubic tenderness and fullness.  No rebound, guarding, or rigidity. Good bowel sounds.  Genitourinary: No CVA tenderness.  Musculoskeletal: Moves all extremities. No obvious deformities. No edema. No calf tenderness.  Skin: Warm and dry.  Neurological:  Alert, awake, and appropriate.  Normal speech.  No acute focal neurological deficits are appreciated.  Psychiatric: Normal affect. Good eye contact. Appropriate in content.     ED Course   Procedures  ED Vital Signs:  Vitals:    07/14/25 1737 07/14/25 1845 07/14/25 1912   BP: (!) 98/58 95/60 101/67   Pulse: 82 81    Resp: 20  16   Temp: 98.1 °F (36.7 °C)     TempSrc: Oral     SpO2: 95% 95% 95%   Weight: 101.1 kg (222 lb 12.8 oz)         Abnormal Lab Results:  Labs Reviewed   URINALYSIS, REFLEX TO URINE CULTURE - Abnormal       Result Value    Color, UA Yellow      Appearance, UA Clear      pH, UA 6.0      Spec Grav UA 1.030      Protein, UA Negative       Glucose, UA 4+ (*)     Ketones, UA Negative      Bilirubin, UA Negative      Blood, UA Negative      Nitrites, UA Negative      Urobilinogen, UA Negative      Leukocyte Esterase, UA Negative     COMPREHENSIVE METABOLIC PANEL - Abnormal    Sodium 132 (*)     Potassium 4.5      Chloride 104      CO2 20 (*)     Glucose 178 (*)     BUN 18      Creatinine 1.1      Calcium 8.8      Protein Total 7.7      Albumin 3.9      Bilirubin Total 0.9      ALP 74      AST 22      ALT 14      Anion Gap 8      eGFR >60     CBC WITH DIFFERENTIAL - Abnormal    WBC 8.42      RBC 5.73      HGB 17.9      HCT 51.5      MCV 90      MCH 31.2 (*)     MCHC 34.8      RDW 13.0      Platelet Count 219      MPV 10.0      Nucleated RBC 0      Neut % 59.7      Lymph % 26.5      Mono % 10.8      Eos % 1.3      Basophil % 1.3      Imm Grans % 0.4      Neut # 5.03      Lymph # 2.23      Mono # 0.91      Eos # 0.11      Baso # 0.11      Imm Grans # 0.03     POCT GLUCOSE - Abnormal    POCT Glucose 187 (*)    CBC W/ AUTO DIFFERENTIAL    Narrative:     The following orders were created for panel order CBC auto differential.  Procedure                               Abnormality         Status                     ---------                               -----------         ------                     CBC with Differential[[id number]]       Abnormal            Final result                 Please view results for these tests on the individual orders.   URINALYSIS MICROSCOPIC    RBC, UA 0      WBC, UA 0      Bacteria, UA None      Yeast, UA None      Hyaline Casts, UA 0      Microscopic Comment       GREY TOP URINE HOLD   POCT GLUCOSE MONITORING CONTINUOUS        All Lab Results:  Results for orders placed or performed during the hospital encounter of 07/14/25   POCT glucose    Collection Time: 07/14/25  6:18 PM   Result Value Ref Range    POCT Glucose 187 (H) 70 - 110 mg/dL   Urinalysis, Reflex to Urine Culture Urine, Catheterized    Collection Time: 07/14/25   6:24 PM    Specimen: Urine, Catheterized   Result Value Ref Range    Color, UA Yellow Straw, Marcia, Yellow, Light-Orange    Appearance, UA Clear Clear    pH, UA 6.0 5.0 - 8.0    Spec Grav UA 1.030 1.005 - 1.030    Protein, UA Negative Negative    Glucose, UA 4+ (A) Negative    Ketones, UA Negative Negative    Bilirubin, UA Negative Negative    Blood, UA Negative Negative    Nitrites, UA Negative Negative    Urobilinogen, UA Negative <2.0 EU/dL    Leukocyte Esterase, UA Negative Negative   Urinalysis Microscopic    Collection Time: 07/14/25  6:24 PM   Result Value Ref Range    RBC, UA 0 0 - 4 /HPF    WBC, UA 0 0 - 5 /HPF    Bacteria, UA None None, Rare, Occasional /HPF    Yeast, UA None None /HPF    Hyaline Casts, UA 0 0 - 1 /LPF    Microscopic Comment     Comprehensive metabolic panel    Collection Time: 07/14/25  6:51 PM   Result Value Ref Range    Sodium 132 (L) 136 - 145 mmol/L    Potassium 4.5 3.5 - 5.1 mmol/L    Chloride 104 95 - 110 mmol/L    CO2 20 (L) 23 - 29 mmol/L    Glucose 178 (H) 70 - 110 mg/dL    BUN 18 8 - 23 mg/dL    Creatinine 1.1 0.5 - 1.4 mg/dL    Calcium 8.8 8.7 - 10.5 mg/dL    Protein Total 7.7 6.0 - 8.4 gm/dL    Albumin 3.9 3.5 - 5.2 g/dL    Bilirubin Total 0.9 0.1 - 1.0 mg/dL    ALP 74 40 - 150 unit/L    AST 22 11 - 45 unit/L    ALT 14 10 - 44 unit/L    Anion Gap 8 8 - 16 mmol/L    eGFR >60 >60 mL/min/1.73/m2   CBC with Differential    Collection Time: 07/14/25  6:51 PM   Result Value Ref Range    WBC 8.42 3.90 - 12.70 K/uL    RBC 5.73 4.60 - 6.20 M/uL    HGB 17.9 14.0 - 18.0 gm/dL    HCT 51.5 40.0 - 54.0 %    MCV 90 82 - 98 fL    MCH 31.2 (H) 27.0 - 31.0 pg    MCHC 34.8 32.0 - 36.0 g/dL    RDW 13.0 11.5 - 14.5 %    Platelet Count 219 150 - 450 K/uL    MPV 10.0 9.2 - 12.9 fL    Nucleated RBC 0 <=0 /100 WBC    Neut % 59.7 38 - 73 %    Lymph % 26.5 18 - 48 %    Mono % 10.8 4 - 15 %    Eos % 1.3 <=8 %    Basophil % 1.3 <=1.9 %    Imm Grans % 0.4 0.0 - 0.5 %    Neut # 5.03 1.8 - 7.7 K/uL    Lymph  # 2.23 1 - 4.8 K/uL    Mono # 0.91 0.3 - 1 K/uL    Eos # 0.11 <=0.5 K/uL    Baso # 0.11 <=0.2 K/uL    Imm Grans # 0.03 0.00 - 0.04 K/uL       Imaging Results:  Imaging Results    None        No EKG ordered.           The Emergency Provider reviewed the vital signs and test results, which are outlined above.     ED Discussion       8:05 PM: Reassessed pt at this time. Discussed with patient and/or family/caretaker all pertinent ED information and results. Discussed pt dx and plan of tx. Gave the patient all f/u and return to the ED instructions. All questions and concerns were addressed at this time. Patient and/or family/caretaker expresses understanding of information and instructions, and is comfortable with plan to discharge. Pt is stable for discharge.     I discussed with patient and/or family/caretaker that evaluation in the ED does not suggest any emergent or life threatening medical conditions requiring immediate intervention beyond what was provided in the ED, and I believe patient is safe for discharge. Regardless, an unremarkable evaluation in the ED does not preclude the development or presence of a serious or life threatening condition. As such, I instructed that the patient is to return immediately for any worsening or change in current symptoms.       Medical Decision Making  DDX: 1. Urinary retention 2. Kidney failure 3. UTI    Wbc normal, kidney fxn normal, UA normal, over 600 on bladder scan, durham catheter placed with good urine return, patient stable for discharge with durham in place, follow up with Dr. Maldonado with urology for voiding trial in 2-3 days.     Amount and/or Complexity of Data Reviewed  Labs: ordered. Decision-making details documented in ED Course.                ED Medication(s):  Medications - No data to display    New Prescriptions    No medications on file        Follow-up Information       Pelon Maldonado MD. Schedule an appointment as soon as possible for a visit in 2 days.     Specialty: Urology  Why: Return to the Emergency Room, If symptoms worsen  Contact information:  [address] [place] [address] [address] [place] [address]  [phone]                                 Scribe Attestation:   Scribe #1: I performed the above scribed service and the documentation accurately describes the services I performed. I attest to the accuracy of the note.     Attending:   Physician Attestation Statement for Scribe #1: I, Almaz Kay MD, personally performed the services described in this documentation, as scribed by Sienna Ivan, in my presence, and it is both accurate and complete.           Clinical Impression       ICD-10-CM ICD-9-CM   1. Acute urinary retention  R33.8 788.29   2. History of tobacco abuse  Z87.891 V15.82   3. History of liver transplant  Z94.4 V42.7       Disposition:   Disposition: Discharged  Condition: Stable       Almaz Kay MD  07/16/25 2239

## 2025-07-15 ENCOUNTER — TELEPHONE (OUTPATIENT)
Dept: UROLOGY | Facility: CLINIC | Age: 70
End: 2025-07-15
Payer: MEDICARE

## 2025-07-15 ENCOUNTER — OFFICE VISIT (OUTPATIENT)
Dept: UROLOGY | Facility: CLINIC | Age: 70
End: 2025-07-15
Payer: MEDICARE

## 2025-07-15 VITALS
SYSTOLIC BLOOD PRESSURE: 108 MMHG | BODY MASS INDEX: 31.97 KG/M2 | RESPIRATION RATE: 18 BRPM | HEIGHT: 70 IN | DIASTOLIC BLOOD PRESSURE: 69 MMHG | HEART RATE: 88 BPM

## 2025-07-15 DIAGNOSIS — R33.8 BENIGN PROSTATIC HYPERPLASIA WITH URINARY RETENTION: Primary | ICD-10-CM

## 2025-07-15 DIAGNOSIS — N40.1 BENIGN PROSTATIC HYPERPLASIA WITH URINARY RETENTION: Primary | ICD-10-CM

## 2025-07-15 PROBLEM — R07.89 ATYPICAL CHEST PAIN: Status: RESOLVED | Noted: 2020-03-03 | Resolved: 2025-07-15

## 2025-07-15 LAB — HOLD SPECIMEN: NORMAL

## 2025-07-15 PROCEDURE — 1159F MED LIST DOCD IN RCRD: CPT | Mod: CPTII,S$GLB,, | Performed by: NURSE PRACTITIONER

## 2025-07-15 PROCEDURE — 1125F AMNT PAIN NOTED PAIN PRSNT: CPT | Mod: CPTII,S$GLB,, | Performed by: NURSE PRACTITIONER

## 2025-07-15 PROCEDURE — 3008F BODY MASS INDEX DOCD: CPT | Mod: CPTII,S$GLB,, | Performed by: NURSE PRACTITIONER

## 2025-07-15 PROCEDURE — 99214 OFFICE O/P EST MOD 30 MIN: CPT | Mod: S$GLB,,, | Performed by: NURSE PRACTITIONER

## 2025-07-15 PROCEDURE — 3078F DIAST BP <80 MM HG: CPT | Mod: CPTII,S$GLB,, | Performed by: NURSE PRACTITIONER

## 2025-07-15 PROCEDURE — 99999 PR PBB SHADOW E&M-EST. PATIENT-LVL III: CPT | Mod: PBBFAC,,, | Performed by: NURSE PRACTITIONER

## 2025-07-15 PROCEDURE — 3074F SYST BP LT 130 MM HG: CPT | Mod: CPTII,S$GLB,, | Performed by: NURSE PRACTITIONER

## 2025-07-15 PROCEDURE — 3288F FALL RISK ASSESSMENT DOCD: CPT | Mod: CPTII,S$GLB,, | Performed by: NURSE PRACTITIONER

## 2025-07-15 PROCEDURE — 1101F PT FALLS ASSESS-DOCD LE1/YR: CPT | Mod: CPTII,S$GLB,, | Performed by: NURSE PRACTITIONER

## 2025-07-15 NOTE — TELEPHONE ENCOUNTER
Called patient back to check on him. Offered to call patient's sister since she is his ride and his alternative contact. Patient stated that he told his sister to be there at 1:30 PM. Patient stated that he wanted to be a little early for their appointment. I let him know again that we would get him taken care or and try to provide as much relief to him as possible. I let patient know that I was sorry he is going through all of this and hopefully we can provide him with some answers. Patient stated that he would try to get some sleep since he has not slept since yesterday morning. Patient did ask if I could call his sister as well to make sure he told her the right time. I stated that I would reach out to her and give her a call.

## 2025-07-15 NOTE — TELEPHONE ENCOUNTER
"Called patient and he stated that he is in a great amount of pain. Patient stated that he went to the emergency department and they drained his bladder by putting a catheter in. Patient requested to be hospitalized but the ED discharged him instead.  Patient stated that last night he was in so much pain that he "asked for the Lord to take him". I then told him that we will get everything taken care of and if he needs to be admitted in the hospital that we could get that taken care of as well. Patient expressed understanding. Patient states that it "feels like fire" when he has the urge to urinate. I advised that we might examine him and we will make sure we do everything we can for him.          Copied from CRM #[id number]. Topic: General Inquiry - Return Call  >> Jul 15, 2025  8:37 AM Bobby wrote:  Type:  Patient Returning Call    Who Called:Chapo Jacobo  Who Left Message for Patient:LAURA  Does the patient know what this is regarding?:yes  Would the patient rather a call back or a response via [facility]? Call back  Best Call Back Number:[phone]  Additional Information: the patient missed 2 calls this morning and was calling back.  "

## 2025-07-15 NOTE — PROGRESS NOTES
Chief Complaint:   BPH with urinary retention    HPI:   Patient is a 69-year-old male that is presenting with urinary retention.  States that he was instructed by Dr. Maldonado to increase tamsulosin to twice daily, however, patient states that he did not feel that tamsulosin was decreasing his urinary frequency, therefore he stopped medication over a week ago.  Was seen in ED with inability to void.  Patient has uncontrolled diabetes.  Denies gross hematuria.  Urine in ED was negative.  Patient was found to be in urinary retention and Campos catheter was placed.  06/09/2025  Joel Jacobo is a 69 y.o. male who presents with history of BPH urinary hesitancy and urinary frequency.  Patient has trouble getting started in the morning.  He has been on tamsulosin for many years.  He has also been on Xanax for many years.  He has uncontrolled diabetes with his last A1c being greater than 10.  He is also on Jardiance.  He is not compliant with his diet.     Allergies:  Darvocet a500 [propoxyphene n-acetaminophen], Paroxetine hcl, Penicillins, and Codeine    Medications:  has a current medication list which includes the following prescription(s): alprazolam, clobetasol 0.05%, dextroamphetamine-amphetamine, fluoxetine, gabapentin, glimepiride, hydrocortisone, hydrocortisone, isosorbide mononitrate, jardiance, ketoconazole, levocetirizine, levothyroxine, losartan, metformin, metoprolol tartrate, mupirocin, nitroglycerin, omega-3 acid ethyl esters, pioglitazone, rosuvastatin, tamsulosin, triamcinolone acetonide 0.1%, valacyclovir, aspirin, tacrolimus, [DISCONTINUED] bupropion, and [DISCONTINUED] cetirizine.    Review of Systems:  General: No fever, chills, fatigability, or weight loss.  Skin: No rashes, itching, or changes in color or texture of skin.  Chest: Denies IRIZARRY, cyanosis, wheezing, cough, and sputum production.  Abdomen: Appetite fine. No weight loss. Denies diarrhea, abdominal pain, hematemesis, or blood in  stool.  Musculoskeletal: No joint stiffness or swelling.  Flank pain  : As above.  All other review of systems negative.    PMH:   has a past medical history of Anxiety, Appendicitis, Coronary artery disease of native artery of native heart with stable angina pectoris (01/19/2024), Depression, Diabetes mellitus, Diastolic dysfunction (01/19/2024), Encounter for blood transfusion, Gallbladder & bile duct stone with obstruction, Herpes, PAD (peripheral artery disease) (07/07/2023), and Sleep apnea.    PSH:   has a past surgical history that includes Appendectomy; Abdominal surgery; Cervical fusion; Brain surgery; Lumbar disc surgery; Tonsillectomy; Liver transplant; bilateral rotator cuff surgery; Cholecystectomy; Colonoscopy (N/A, 7/10/2017); and Selective injection of anesthetic agent around lumbar spinal nerve root by transforaminal approach (Bilateral, 9/18/2023).    FamHx: family history includes Arthritis in his mother; Cancer in his father and maternal grandfather; Fibromyalgia in his sister; Melanoma in his father.    SocHx:  reports that he has been smoking cigarettes. He has a 46 pack-year smoking history. He has been exposed to tobacco smoke. He has never used smokeless tobacco. He reports that he does not currently use alcohol after a past usage of about 2.0 standard drinks of alcohol per week. He reports current drug use. Drug: Marijuana.      Physical Exam:  Vitals:    07/15/25 1436   BP: 108/69   Pulse: 88   Resp: 18     General:  Morbidly obese male, A&Ox3, no apparent distress, no deformities  Neck: No masses, normal thyroid  Lungs: normal inspiration, no use of accessory muscles  Heart: normal pulse, no arrhythmias  Abdomen: Soft, NT, ND, no masses, no hernias, no hepatosplenomegaly  Lymphatic: Neck and groin nodes negative  Skin: The skin is warm and dry. No jaundice.  Ext: No c/c/e.      Impression/Plan:   Urinary retention  Patient has been followed by Dr. Maldonado, therefore today's visit was  presented for recommendation.  Patient to restart tamsulosin once daily and return to clinic in 1 week for voiding trial.  Patient has uncontrolled diabetes, possibility of neurogenic bladder.

## 2025-07-15 NOTE — DISCHARGE INSTRUCTIONS
Please follow up with Dr. Maldonado your urologist in 2-3 days for voiding trial to get the durham catheter removed. Please take your flomax one pill daily.

## 2025-07-15 NOTE — TELEPHONE ENCOUNTER
Called patient's sister per his request to let her know what time patient's appointment was. I let her know that if they wanted to arrive a little early that was okay. I confirmed location and time with her. I let her know we were in the first building on the second floor. She expressed understanding. No further questions at this time.

## 2025-07-15 NOTE — TELEPHONE ENCOUNTER
.Outgoing call placed to patient, patient verified identifiers, patient stated he is in pain and would like to be seen today, he was notified that Dr. Maldonado does not have any availability on today but I can get him on with the NP who also consults with  and may be able to assist him and get feedback from Dr. Maldonado, he verbalized understanding and appt scheduled for this afternoon.        Copied from CRM #[id number]. Topic: Appointments - Appointment Access  >> Jul 15, 2025  8:03 AM Betsey wrote:  .Type:  Sooner Apoointment Request    Caller is requesting a sooner appointment.  Caller declined first available appointment listed below.  Caller will not accept being placed on the waitlist and is requesting a message be sent to doctor.  Name of Caller:.Chapo Jacobo  When is the first available appointment? 7/18  Symptoms:  Would the patient rather a call back or a response via [facility]? Call back  Best Call Back Number:.[phone]  Additional Information: pt was seen in hospital yesterday and a catheter was put in he states he is in great pain and would like to be seen today

## 2025-07-16 PROBLEM — I31.39 PERICARDIAL EFFUSION: Status: ACTIVE | Noted: 2025-07-16

## 2025-07-22 ENCOUNTER — OFFICE VISIT (OUTPATIENT)
Dept: UROLOGY | Facility: CLINIC | Age: 70
End: 2025-07-22
Payer: MEDICARE

## 2025-07-22 ENCOUNTER — CLINICAL SUPPORT (OUTPATIENT)
Dept: UROLOGY | Facility: CLINIC | Age: 70
End: 2025-07-22
Payer: MEDICARE

## 2025-07-22 ENCOUNTER — TELEPHONE (OUTPATIENT)
Dept: UROLOGY | Facility: CLINIC | Age: 70
End: 2025-07-22
Payer: MEDICARE

## 2025-07-22 VITALS — DIASTOLIC BLOOD PRESSURE: 61 MMHG | HEART RATE: 81 BPM | SYSTOLIC BLOOD PRESSURE: 100 MMHG | RESPIRATION RATE: 18 BRPM

## 2025-07-22 DIAGNOSIS — R33.8 BENIGN PROSTATIC HYPERPLASIA WITH URINARY RETENTION: Primary | ICD-10-CM

## 2025-07-22 DIAGNOSIS — N40.1 BENIGN PROSTATIC HYPERPLASIA WITH URINARY RETENTION: Primary | ICD-10-CM

## 2025-07-22 LAB
POC RESIDUAL URINE VOLUME: 251 ML (ref 0–100)
POC RESIDUAL URINE VOLUME: 28 ML (ref 0–100)

## 2025-07-22 PROCEDURE — 3078F DIAST BP <80 MM HG: CPT | Mod: CPTII,S$GLB,, | Performed by: UROLOGY

## 2025-07-22 PROCEDURE — 99999 PR PBB SHADOW E&M-EST. PATIENT-LVL II: CPT | Mod: PBBFAC,,, | Performed by: UROLOGY

## 2025-07-22 PROCEDURE — 51798 US URINE CAPACITY MEASURE: CPT | Mod: S$GLB,,, | Performed by: UROLOGY

## 2025-07-22 PROCEDURE — 99214 OFFICE O/P EST MOD 30 MIN: CPT | Mod: S$GLB,,, | Performed by: UROLOGY

## 2025-07-22 PROCEDURE — 3074F SYST BP LT 130 MM HG: CPT | Mod: CPTII,S$GLB,, | Performed by: UROLOGY

## 2025-07-22 PROCEDURE — 1125F AMNT PAIN NOTED PAIN PRSNT: CPT | Mod: CPTII,S$GLB,, | Performed by: UROLOGY

## 2025-07-22 RX ORDER — LIDOCAINE HYDROCHLORIDE 20 MG/ML
JELLY TOPICAL
Status: SHIPPED | OUTPATIENT
Start: 2025-07-22

## 2025-07-22 RX ORDER — CIPROFLOXACIN 500 MG/1
500 TABLET, FILM COATED ORAL
Status: DISCONTINUED | OUTPATIENT
Start: 2025-07-22 | End: 2025-07-22

## 2025-07-22 NOTE — PROGRESS NOTES
Patient presents to clinic for a voiding trial. Per Dr. Maldonado, #16 fr cath was removed using a 10 cc syringe performing proper sterile technique. Patient has been instructed to flush bladder and drink plenty of fluids. Keep durham out and return to clinic this afternoon. Patient left clinic per ambulatory self.

## 2025-07-22 NOTE — PROGRESS NOTES
Chief Complaint:   Encounter Diagnosis   Name Primary?    Benign prostatic hyperplasia with urinary retention Yes       HPI:  HPI Chapo Jacobo nanci 69 y.o. male who presents with follow up from urinary retention.  He was seen in the emergency room with the urinary retention had a Campos placed.  This morning he had his Campos removed he comes back today stating he voided once in the toilet.    History:  Social History[1]  Past Medical History:   Diagnosis Date    Anxiety     Appendicitis     Coronary artery disease of native artery of native heart with stable angina pectoris 01/19/2024    Depression     Diabetes mellitus     Diastolic dysfunction 01/19/2024    Encounter for blood transfusion     Gallbladder & bile duct stone with obstruction     Herpes     PAD (peripheral artery disease) 07/07/2023    Sleep apnea      Past Surgical History:   Procedure Laterality Date    ABDOMINAL SURGERY      APPENDECTOMY      bilateral rotator cuff surgery      BRAIN SURGERY      CERVICAL FUSION      CHOLECYSTECTOMY      COLONOSCOPY N/A 7/10/2017    Procedure: COLONOSCOPY;  Surgeon: Viviana Reyes MD;  Location: [facility] ENDO;  Service: Endoscopy;  Laterality: N/A;    LIVER TRANSPLANT      LUMBAR DISC SURGERY      SELECTIVE INJECTION OF ANESTHETIC AGENT AROUND LUMBAR SPINAL NERVE ROOT BY TRANSFORAMINAL APPROACH Bilateral 9/18/2023    Procedure: Bilateral L4/5 TF JULIUS;  Surgeon: Bill Read MD;  Location: [facility];  Service: Pain Management;  Laterality: Bilateral;    TONSILLECTOMY       Family History   Problem Relation Name Age of Onset    Arthritis Mother      Melanoma Father      Cancer Father          melanoma    Fibromyalgia Sister      Cancer Maternal Grandfather          unknown type       Medications Ordered Prior to Encounter[2]     Objective:     Vitals:    07/22/25 1447   BP: 100/61   BP Location: Left arm   Patient Position: Sitting   Pulse: 81   Resp: 18      BMI Readings from Last 1 Encounters:   07/15/25  31.97 kg/m²          Physical Exam  No acute distress alert and oriented   Respirations even unlabored   Abdomen is soft nontender    Lab Results   Component Value Date    PSA 0.88 06/09/2025    PSA 0.58 11/01/2016    PSA 0.7 03/30/2005        Lab Results   Component Value Date    CREATININE 1.1 07/14/2025      Assessment:       1. Benign prostatic hyperplasia with urinary retention        Plan:     1. Benign prostatic hyperplasia with urinary retention       Orders Placed This Encounter    POCT Bladder Scan    POCT Bladder Scan    LIDOcaine HCl 2% urojet      Bladder scan after he voided in the office was less than 20 cc.  Was discussed patient continue taking tamsulosin.  I will re-evaluate him in about 2 weeks.       [1]   Social History  Tobacco Use    Smoking status: Every Day     Current packs/day: 1.00     Average packs/day: 1 pack/day for 46.0 years (46.0 ttl pk-yrs)     Types: Cigarettes     Passive exposure: Past    Smokeless tobacco: Never   Substance Use Topics    Alcohol use: Not Currently     Alcohol/week: 2.0 standard drinks of alcohol     Types: 2 Cans of beer per week    Drug use: Yes     Types: Marijuana     Comment: Smoke Marijuana occasionally   [2]   Current Outpatient Medications on File Prior to Visit   Medication Sig Dispense Refill    alprazolam (XANAX) 1 MG tablet Take 1 mg by mouth 2 (two) times daily. Patient takes 1 tablet twice daily  1    aspirin (ECOTRIN) 81 MG EC tablet Take 1 tablet (81 mg total) by mouth once daily. (Patient not taking: Reported on 4/29/2025) 30 tablet 12    clobetasol 0.05% (TEMOVATE) 0.05 % Oint Apply topically 2 (two) times daily. Use on knees 60 g 2    dextroamphetamine-amphetamine 30 mg Tab Take 1 tablet by mouth as needed.      FLUoxetine 20 MG capsule Take 4 capsules by mouth Daily.       gabapentin (NEURONTIN) 100 MG capsule Take 100 mg by mouth 3 (three) times daily.      glimepiride (AMARYL) 4 MG tablet Take 1 tablet by mouth as needed.        hydrocortisone 2.5 % cream   0    hydrocortisone 2.5 % cream Apply topically 2 (two) times daily. Mix with ketoconazole cream and AAA on beard BID for up to 2 weeks at a time 30 g 1    isosorbide mononitrate (IMDUR) 60 MG 24 hr tablet Take 1 tablet (60 mg total) by mouth once daily. 90 tablet 3    JARDIANCE 25 mg tablet Take 25 mg by mouth.      ketoconazole (NIZORAL) 2 % cream Apply topically 2 (two) times daily. Mix with hydrocortisone cream and AAA on beard BID for up to 2 weeks at a time 30 g 1    levocetirizine (XYZAL) 5 MG tablet Take 5 mg by mouth every evening.      levothyroxine (SYNTHROID) 25 MCG tablet Take 25 mcg by mouth every morning.      losartan (COZAAR) 50 MG tablet Take 50 mg by mouth once daily.      metformin (GLUCOPHAGE) 1000 MG tablet Take 1 tablet by mouth Daily.      metoprolol tartrate (LOPRESSOR) 50 MG tablet Take one tablet at 8PM the evening before and one tablet at 7AM the morning before  CTA 2 tablet 0    mupirocin (BACTROBAN) 2 % ointment       nitroGLYCERIN (NITROSTAT) 0.4 MG SL tablet Place 1 tablet (0.4 mg total) under the tongue every 5 (five) minutes as needed for Chest pain. 30 tablet 12    omega-3 acid ethyl esters (LOVAZA) 1 gram capsule Take 1 capsule by mouth 3 (three) times daily.      pioglitazone (ACTOS) 15 MG tablet Take 15 mg by mouth.      rosuvastatin (CRESTOR) 5 MG tablet Take 5 mg by mouth.      tacrolimus (PROGRAF) 1 MG Cap Take 2 capsules (2 mg total) by mouth every morning AND 1 capsule (1 mg total) every evening. 270 capsule 3    tamsulosin (FLOMAX) 0.4 mg Cap Take 2 capsules (0.8 mg total) by mouth once daily. 60 capsule 5    triamcinolone acetonide 0.1% (KENALOG) 0.1 % Lotn       valacyclovir (VALTREX) 500 MG tablet Take 500 mg by mouth once daily.       [DISCONTINUED] buPROPion (WELLBUTRIN XL) 300 MG 24 hr tablet 1 tablet in the morning Orally Once a day      [DISCONTINUED] cetirizine (ZYRTEC) 10 MG tablet 1 tablet Orally Once a day for 90 days       No  current facility-administered medications on file prior to visit.

## 2025-07-22 NOTE — TELEPHONE ENCOUNTER
MA called patient and received 2 patient identifiers. Patient stated that he drank about a gallon of water and that he couldn't urinate. Patient stated that he only can dribble a little urine out. Patient stated that he does not want another cath, however the patient is on his way back to the office at [facility].     Copied from CRM #[id number]. Topic: General Inquiry - Patient Advice  >> Jul 22, 2025  1:40 PM Ty wrote:  Type:  Needs Medical Advice    Who Called: herson  Symptoms (please be specific):  catheter issues   How long has patient had these symptoms:    Pharmacy name and phone #:    Would the patient rather a call back or a response via [facility]? Call back   Best Call Back Number: [phone]  Additional Information: requesting to be put in the hospital for test due to discomfort and issues with urinating

## 2025-07-31 ENCOUNTER — TELEPHONE (OUTPATIENT)
Dept: UROLOGY | Facility: CLINIC | Age: 70
End: 2025-07-31
Payer: MEDICARE

## 2025-07-31 NOTE — TELEPHONE ENCOUNTER
Spoke tot he pt regarding the below message. He got his catheter  out Tuesday and doubled up on his medication as instructed bu the doctor. He has been urinating w/o a problem. He wanted to know if riding a really rough  could effect his bladder. Dr. Maldonado said it could and he does not recommend it. The pt verbalized understanding.      Copied from CRM #[id number]. Topic: General Inquiry - Patient Advice  >> Jul 31, 2025  3:25 PM Rosario wrote:  ..Type:  Patient Requesting Call    Who Called: pt   Does the patient know what this is regarding?: pt wants to know if they can cut grass after having cath removed   Would the patient rather a call back or a response via [facility]? call  Best Call Back Number: .[phone]  Additional Information: